# Patient Record
Sex: FEMALE | Race: BLACK OR AFRICAN AMERICAN | NOT HISPANIC OR LATINO | Employment: UNEMPLOYED | URBAN - METROPOLITAN AREA
[De-identification: names, ages, dates, MRNs, and addresses within clinical notes are randomized per-mention and may not be internally consistent; named-entity substitution may affect disease eponyms.]

---

## 2017-02-20 ENCOUNTER — ANESTHESIA (OUTPATIENT)
Dept: OBGYN | Facility: CLINIC | Age: 22
End: 2017-02-20
Payer: COMMERCIAL

## 2017-02-20 ENCOUNTER — ANESTHESIA EVENT (OUTPATIENT)
Dept: OBGYN | Facility: CLINIC | Age: 22
End: 2017-02-20
Payer: COMMERCIAL

## 2017-02-20 PROBLEM — O14.90 PREECLAMPSIA: Status: ACTIVE | Noted: 2017-02-20

## 2017-02-20 PROBLEM — Z36.89 ENCOUNTER FOR TRIAGE IN PREGNANT PATIENT: Status: ACTIVE | Noted: 2017-02-20

## 2017-02-20 PROCEDURE — 25000125 ZZHC RX 250: Performed by: ANESTHESIOLOGY

## 2017-02-20 RX ORDER — LIDOCAINE HYDROCHLORIDE AND EPINEPHRINE 5; 5 MG/ML; UG/ML
INJECTION, SOLUTION INFILTRATION; PERINEURAL PRN
Status: DISCONTINUED | OUTPATIENT
Start: 2017-02-20 | End: 2017-02-21 | Stop reason: HOSPADM

## 2017-02-20 RX ADMIN — Medication 5 ML: at 19:38

## 2017-02-20 RX ADMIN — LIDOCAINE HYDROCHLORIDE AND EPINEPHRINE 5 ML: 5; 5 INJECTION, SOLUTION INFILTRATION; PERINEURAL at 20:29

## 2017-02-20 RX ADMIN — Medication 5 ML: at 19:35

## 2017-02-21 NOTE — ANESTHESIA PROCEDURE NOTES
Epidural Procedure Note    Staff:     Anesthesiologist:  KAILEY RIVERA    Resident/CRNA:  MEGAN SZYMANSKI    Procedure performed by resident/CRNA in the presence of a teaching physician    Location: OB     Procedure start time:  2/20/2017 7:10 PM     Procedure end time:  2/20/2017 7:39 PM   Pre-procedure checklist:   patient identified, IV checked, risks and benefits discussed, informed consent, monitors and equipment checked, pre-op evaluation and at physician/surgeon's request      Correct Patient: Yes      Correct Position: Yes      Correct Site: Yes      Correct Procedure: Yes      Correct Laterality:  N/A  Procedure:     Procedure:  Epidural catheter    ASA:  2    Position:  Sitting    Sterile Prep: chloraprep      Insertion site:  L3-4    Local skin infiltration:  1% lidocaine    amount (mL):  2    Approach:  Midline    Needle gauge (G):  17    Needle Length (in):  3.5    Block Needle Type:  Touhy    Injection Technique:  LORT saline    TAMIKO at (cm):  7    Attempts:  1    Redirects:  0    Catheter gauge (G):  19    Catheter threaded easily: Yes      Threaded to cm at skin:  12    Threaded in epidural space (cm):  5    Paresthesias:  No    Aspiration negative for Heme or CSF: Yes       Local anesthetic:  Lidocaine 1.5% w/ 1:200,000 epinephrine    Test dose time:  19:30    Test dose negative for signs of intravascular, subdural or intrathecal injection: Yes

## 2017-02-21 NOTE — ANESTHESIA PREPROCEDURE EVALUATION
Anesthesia Evaluation       history and physical reviewed .      No history of anesthetic complications     ROS/MED HX    ENT/Pulmonary:  - neg pulmonary ROS     Neurologic:  - neg neurologic ROS     Cardiovascular:  - neg cardiovascular ROS       METS/Exercise Tolerance:     Hematologic:         Musculoskeletal:         GI/Hepatic:  - neg GI/hepatic ROS       Renal/Genitourinary:         Endo:         Psychiatric:         Infectious Disease:         Malignancy:         Other:               Physical Exam  Normal systems: cardiovascular, pulmonary and dental    Airway   Mallampati: III  TM distance: > 3 FB  Neck ROM: full    Dental     Cardiovascular       Pulmonary             (+) pre-eclampsia               Anesthesia Plan      History & Physical Review      ASA Status:  .  OB Epidural Asa: 2            Postoperative Care      Consents  Anesthetic plan, risks, benefits and alternatives discussed with:  Patient..

## 2018-01-08 ENCOUNTER — HOSPITAL ENCOUNTER (OUTPATIENT)
Dept: ULTRASOUND IMAGING | Facility: CLINIC | Age: 23
Discharge: HOME | End: 2018-01-08
Attending: MIDWIFE | Admitting: MIDWIFE
Payer: COMMERCIAL

## 2018-01-08 DIAGNOSIS — N91.2 AMENORRHEA: ICD-10-CM

## 2018-01-08 PROCEDURE — 76801 OB US < 14 WKS SINGLE FETUS: CPT

## 2018-03-14 DIAGNOSIS — Z36.89 ENCOUNTER FOR FETAL ANATOMIC SURVEY: Primary | ICD-10-CM

## 2018-03-16 ENCOUNTER — PRE VISIT (OUTPATIENT)
Dept: MATERNAL FETAL MEDICINE | Facility: CLINIC | Age: 23
End: 2018-03-16

## 2018-03-20 ENCOUNTER — OFFICE VISIT (OUTPATIENT)
Dept: MATERNAL FETAL MEDICINE | Facility: CLINIC | Age: 23
End: 2018-03-20
Attending: ADVANCED PRACTICE MIDWIFE
Payer: COMMERCIAL

## 2018-03-20 ENCOUNTER — HOSPITAL ENCOUNTER (OUTPATIENT)
Dept: ULTRASOUND IMAGING | Facility: CLINIC | Age: 23
Discharge: HOME | End: 2018-03-20
Attending: ADVANCED PRACTICE MIDWIFE | Admitting: OBSTETRICS & GYNECOLOGY
Payer: COMMERCIAL

## 2018-03-20 DIAGNOSIS — Z36.89 ENCOUNTER FOR FETAL ANATOMIC SURVEY: Primary | ICD-10-CM

## 2018-03-20 DIAGNOSIS — O26.90 PREGNANCY RELATED CONDITION, ANTEPARTUM: ICD-10-CM

## 2018-03-20 PROCEDURE — 76811 OB US DETAILED SNGL FETUS: CPT

## 2018-03-20 NOTE — PROGRESS NOTES
"Please see \"Imaging\" tab under \"Chart Review\" for details of today's US.      Gauri Del Rosario, DO  Maternal-Fetal Medicine        "

## 2018-03-20 NOTE — MR AVS SNAPSHOT
"              After Visit Summary   3/20/2018    Vandana Lagos    MRN: [id number]           Patient Information     Date Of Birth          1995        Visit Information        Provider Department      3/20/2018 12:15 PM Gauri Del Rosario, DO [facility] Maternal Fetal Medicine [facility]        Today's Diagnoses     Encounter for fetal anatomic survey    -  1       Follow-ups after your visit        Who to contact     If you have questions or need follow up information about today's clinic visit or your schedule please contact [facility] MATERNAL FETAL MEDICINE [facility] directly at [phone].  Normal or non-critical lab and imaging results will be communicated to you by [employer], letter or phone within 4 business days after the clinic has received the results. If you do not hear from us within 7 days, please contact the clinic through [employer] or phone. If you have a critical or abnormal lab result, we will notify you by phone as soon as possible.  Submit refill requests through [employer] or call your pharmacy and they will forward the refill request to us. Please allow 3 business days for your refill to be completed.          Additional Information About Your Visit        MyChart Information     [employer] lets you send messages to your doctor, view your test results, renew your prescriptions, schedule appointments and more. To sign up, go to [email or web address] . Click on \"Log in\" on the left side of the screen, which will take you to the Welcome page. Then click on \"Sign up Now\" on the right side of the page.     You will be asked to enter the access code listed below, as well as some personal information. Please follow the directions to create your username and password.     Your access code is: [email or web address]  Expires: 2018 12:53 PM     Your access code will  in 90 days. If you need help or a new code, please call your [place] clinic or [phone].        Care EveryWhere ID     This is your " Care EveryWhere ID. This could be used by other organizations to access your [place] medical records  [id number]        Your Vitals Were     Last Period                   09/10/2017            Blood Pressure from Last 3 Encounters:   02/22/17 129/79    Weight from Last 3 Encounters:   No data found for Wt              Today, you had the following     No orders found for display       Primary Care Provider Office Phone # Fax #    Clinic [facility] [phone]       [address] [facility] [address]        Equal Access to Services     MILANA OSULLIVAN : Hadii aad ku hadasho Soomaali, waaxda luqadaha, qaybta kaalmada adeegyada, waxay idiin hayaan adeeg kharaleeann lacorey . So [facility] [phone].    ATENCIÓN: Si habla español, tiene a waldron disposición servicios gratuitos de asistencia lingüística. Llame al [phone].    We comply with applicable federal civil rights laws and [place] laws. We do not discriminate on the basis of race, color, national origin, age, disability, sex, sexual orientation, or gender identity.            Thank you!     Thank you for choosing [facility]  for your care. Our goal is always to provide you with excellent care. Hearing back from our patients is one way we can continue to improve our services. Please take a few minutes to complete the written survey that you may receive in the mail after your visit with us. Thank you!             Your Updated Medication List - Protect others around you: Learn how to safely use, store and throw away your medicines at www.disposemymeds.org.          This list is accurate as of 3/20/18 12:53 PM.  Always use your most recent med list.                   Brand Name Dispense Instructions for use Diagnosis    acetaminophen 325 MG tablet    TYLENOL    100 tablet    Take 2 tablets (650 mg) by mouth every 6 hours as needed for mild pain or fever (greater than or equal to 38 C /100.4F (oral))    Vaginal delivery        flunisolide HFA 80 MCG/ACT Aers oral inhaler    AEROSPAN          Prenatal Vitamins 28-0.8 MG Tabs           PROAIR  (90 BASE) MCG/ACT Inhaler   Generic drug:  albuterol      Inhale 2 puffs into the lungs every 6 hours        VITAMIN D (CHOLECALCIFEROL) PO      Take 5,000 Units by mouth daily

## 2018-05-14 ENCOUNTER — HOSPITAL ENCOUNTER (OUTPATIENT)
Facility: CLINIC | Age: 23
End: 2018-05-14
Admitting: ADVANCED PRACTICE MIDWIFE
Payer: COMMERCIAL

## 2018-05-14 ENCOUNTER — HOSPITAL ENCOUNTER (OUTPATIENT)
Facility: CLINIC | Age: 23
Discharge: HOME | End: 2018-05-14
Attending: ADVANCED PRACTICE MIDWIFE | Admitting: ADVANCED PRACTICE MIDWIFE
Payer: COMMERCIAL

## 2018-05-14 VITALS — SYSTOLIC BLOOD PRESSURE: 116 MMHG | TEMPERATURE: 97.9 F | DIASTOLIC BLOOD PRESSURE: 73 MMHG

## 2018-05-14 LAB
ALBUMIN UR-MCNC: 10 MG/DL
APPEARANCE UR: CLEAR
BACTERIA #/AREA URNS HPF: ABNORMAL /HPF
BILIRUB UR QL STRIP: NEGATIVE
COLOR UR AUTO: YELLOW
GLUCOSE UR STRIP-MCNC: 300 MG/DL
HGB UR QL STRIP: ABNORMAL
KETONES UR STRIP-MCNC: NEGATIVE MG/DL
LEUKOCYTE ESTERASE UR QL STRIP: ABNORMAL
MUCOUS THREADS #/AREA URNS LPF: PRESENT /LPF
NITRATE UR QL: NEGATIVE
PH UR STRIP: 6.5 PH (ref 5–7)
RBC #/AREA URNS AUTO: 1 /HPF (ref 0–2)
SOURCE: ABNORMAL
SP GR UR STRIP: 1.02 (ref 1–1.03)
SPECIMEN SOURCE: NORMAL
SQUAMOUS #/AREA URNS AUTO: 3 /HPF (ref 0–1)
UROBILINOGEN UR STRIP-MCNC: NORMAL MG/DL (ref 0–2)
WBC #/AREA URNS AUTO: 6 /HPF (ref 0–5)
WET PREP SPEC: NORMAL

## 2018-05-14 PROCEDURE — 87210 SMEAR WET MOUNT SALINE/INK: CPT | Performed by: ADVANCED PRACTICE MIDWIFE

## 2018-05-14 PROCEDURE — G0463 HOSPITAL OUTPT CLINIC VISIT: HCPCS | Mod: 25

## 2018-05-14 PROCEDURE — 87491 CHLMYD TRACH DNA AMP PROBE: CPT | Performed by: ADVANCED PRACTICE MIDWIFE

## 2018-05-14 PROCEDURE — 81001 URINALYSIS AUTO W/SCOPE: CPT | Performed by: ADVANCED PRACTICE MIDWIFE

## 2018-05-14 PROCEDURE — 87591 N.GONORRHOEAE DNA AMP PROB: CPT | Performed by: ADVANCED PRACTICE MIDWIFE

## 2018-05-14 RX ORDER — ONDANSETRON 2 MG/ML
4 INJECTION INTRAMUSCULAR; INTRAVENOUS EVERY 6 HOURS PRN
Status: DISCONTINUED | OUTPATIENT
Start: 2018-05-14 | End: 2018-05-14 | Stop reason: HOSPADM

## 2018-05-14 NOTE — IP AVS SNAPSHOT
[facility]    [address] [place] [address]    Phone:  [phone]                                       After Visit Summary   5/14/2018    Vandana Lagos    MRN: [id number]           After Visit Summary Signature Page     I have received my discharge instructions, and my questions have been answered. I have discussed any challenges I see with this plan with the nurse or doctor.    ..........................................................................................................................................  Patient/Patient Representative Signature      ..........................................................................................................................................  Patient Representative Print Name and Relationship to Patient    ..................................................               ................................................  Date                                            Time    ..........................................................................................................................................  Reviewed by Signature/Title    ...................................................              ..............................................  Date                                                            Time

## 2018-05-14 NOTE — DISCHARGE INSTRUCTIONS
Discharge Instruction for Undelivered Patients      You were seen for: Bleeding Assessment  We Consulted: HUNTER Haynes CNM  You had (Test or Medicine): labs    Diet:   Drink 8 to 12 glasses of liquids (milk, juice, water) every day.     Activity:  Call your doctor or nurse midwife if your baby is moving less than usual.     Call your provider if you notice:  Swelling in your face or increased swelling in your hands or legs.  Headaches that are not relieved by Tylenol (acetaminophen).  Changes in your vision (blurring: seeing spots or stars.)  Nausea (sick to your stomach) and vomiting (throwing up).   Weight gain of 5 pounds or more per week.  Heartburn that doesn't go away.  Signs of bladder infection: pain when you urinate (use the toilet), need to go more often and more urgently.  The bag of julien (rupture of membranes) breaks, or you notice leaking in your underwear.  Bright red blood in your underwear.  Abdominal (lower belly) or stomach pain.  For first baby: Contractions (tightening) less than 5 minutes apart for one hour or more.  Second (plus) baby: Contractions (tightening) less than 10 minutes apart and getting stronger.  *If less than 34 weeks: Contractions (tightenings) more than 6 times in one hour.  Increase or change in vaginal discharge (note the color and amount)    Follow-up:  As scheduled in the clinic

## 2018-05-14 NOTE — PROGRESS NOTES
Data: [facility] at 1242.   Reason for maternal/fetal assessment per patient is pink discharge.  Patient is a . Prenatal record reviewed.      Obstetric History       T1      L1     SAB0   TAB0   Ectopic0   Multiple0   Live Births1       # Outcome Date GA Lbr Nikolai/2nd Weight Sex Delivery Anes PTL Lv   2 Current            1 Term 17 40w1d 04:35 / 00:33 3.6 kg (7 lb 15 oz) M Vag-Spont EPI  TENA      Name: ALDO CALDERONEVSLICK      Complications: GBS,Preeclampsia/Hypertension      Apgar1:  8                Apgar5: 9         Medical History:   Past Medical History:   Diagnosis Date     Preeclampsia 2017     Uncomplicated asthma    . Gestational Age 30w6d. VSS. Pt presents with c/o light pink.  Cervix: closed.  Fetal movement present. Patient denies cramping, backache, vaginal discharge, pelvic pressure, UTI symptoms, GI problems, edema, headache, visual disturbances, epigastric or URQ pain, abdominal pain, rupture of membranes. Support persons not present.  Action: Verbal consent for EFM. Triage assessment completed. EFM applied for fetal tracing. Uterine assessment toco. Fetal assessment: Presumed adequate fetal oxygenation documented (see flow record). Patient education pamphlets given on fetal movement counts and dc instructions. Patient instructed to report change in fetal movement, vaginal leaking of fluid or bleeding, abdominal pain, or any concerns related to the pregnancy to her nurse/physician.   Response: HUNTER Haynes CNM informed of pt arrival and assessed pt. Plan per provider is dc to home. Patient verbalized understanding of education and verbalized agreement with plan. Discharged ambulatory at 1500.

## 2018-05-14 NOTE — IP AVS SNAPSHOT
MRN:[id number]                      After Visit Summary   5/14/2018    Vandana Lagos    MRN: [id number]           Thank you!     Thank you for choosing [facility] for your care. Our goal is always to provide you with excellent care. Hearing back from our patients is one way we can continue to improve our services. Please take a few minutes to complete the written survey that you may receive in the mail after you visit with us. Thank you!        Patient Information     Date Of Birth          1995        About your hospital stay     You were admitted on:  May 14, 2018 You last received care in the:  [address]    You were discharged on:  May 14, 2018       Who to Call     For medical emergencies, please call 911.  For non-urgent questions about your medical care, please call your primary care provider or clinic, [phone]          Attending Provider     Provider Daniel Vaz APRN CNM [facility]       Primary Care Provider Office Phone # Fax #    Clinic [facility] [phone]      Further instructions from your care team       Discharge Instruction for Undelivered Patients      You were seen for: Bleeding Assessment  We Consulted: HUNTER Haynes CNM  You had (Test or Medicine): labs    Diet:   Drink 8 to 12 glasses of liquids (milk, juice, water) every day.     Activity:  Call your doctor or nurse midwife if your baby is moving less than usual.     Call your provider if you notice:  Swelling in your face or increased swelling in your hands or legs.  Headaches that are not relieved by Tylenol (acetaminophen).  Changes in your vision (blurring: seeing spots or stars.)  Nausea (sick to your stomach) and vomiting (throwing up).   Weight gain of 5 pounds or more per week.  Heartburn that doesn't go away.  Signs of bladder infection: pain when you urinate (use the toilet), need to go more often and more urgently.  The bag of julien (rupture of membranes) breaks, or you notice leaking  "in your underwear.  Bright red blood in your underwear.  Abdominal (lower belly) or stomach pain.  For first baby: Contractions (tightening) less than 5 minutes apart for one hour or more.  Second (plus) baby: Contractions (tightening) less than 10 minutes apart and getting stronger.  *If less than 34 weeks: Contractions (tightenings) more than 6 times in one hour.  Increase or change in vaginal discharge (note the color and amount)    Follow-up:  As scheduled in the clinic          Pending Results     Date and Time Order Name Status Description    2018 1334 Chlamydia trachomatis PCR In process     2018 1334 Neisseria gonorrhoea PCR In process             Statement of Approval     Ordered          18 1456  I have reviewed and agree with all the recommendations and orders detailed in this document.  EFFECTIVE NOW     Approved and electronically signed by:  Daneil Haynes APRN CNM             Admission Information     Date & Time Provider Department Dept. Phone    2018 Daniel Haynes APRN CNM [facility] [phone]      Your Vitals Were     Blood Pressure Temperature Last Period             116/73 97.9  F (36.6  C) (Oral) 09/10/2017         [email or web address] Information     [email or web address] lets you send messages to your doctor, view your test results, renew your prescriptions, schedule appointments and more. To sign up, go to [email or web address] . Click on \"Log in\" on the left side of the screen, which will take you to the Welcome page. Then click on \"Sign up Now\" on the right side of the page.     You will be asked to enter the access code listed below, as well as some personal information. Please follow the directions to create your username and password.     Your access code is: [email or web address]  Expires: 2018 12:53 PM     Your access code will  in 90 days. If you need help or a new code, please call your [place] clinic or [phone].        Care EveryWhere ID     This is your Care EveryWhere ID. " This could be used by other organizations to access your [place] medical records  [id number]        Equal Access to Services     MILANA OSULLIVAN : Hadii aad ku hadneerajrui [place], wadevinda graceryneha, qaybta karanjitda [place], waxkenia idiin haymarcellreid daughertygabriel annaliseblasleeann perera. So [facility] [phone].    ATENCIÓN: Si habla español, tiene a waldron disposición servicios gratuitos de asistencia lingüística. Llame al [phone].    We comply with applicable federal civil rights laws and [place] laws. We do not discriminate on the basis of race, color, national origin, age, disability, sex, sexual orientation, or gender identity.               Review of your medicines      UNREVIEWED medicines. Ask your doctor about these medicines        Dose / Directions    acetaminophen 325 MG tablet   Commonly known as:  TYLENOL   Used for:  Vaginal delivery        Dose:  650 mg   Take 2 tablets (650 mg) by mouth every 6 hours as needed for mild pain or fever (greater than or equal to 38 C /100.4F (oral))   Quantity:  100 tablet   Refills:  0       flunisolide HFA 80 MCG/ACT Aers oral inhaler   Commonly known as:  AEROSPAN        Refills:  0       Prenatal Vitamins 28-0.8 MG Tabs        Refills:  0       PROAIR  (90 Base) MCG/ACT Inhaler   Generic drug:  albuterol        Dose:  2 puff   Inhale 2 puffs into the lungs every 6 hours   Refills:  0       VITAMIN D (CHOLECALCIFEROL) PO        Dose:  5000 Units   Take 5,000 Units by mouth daily   Refills:  0                Protect others around you: Learn how to safely use, store and throw away your medicines at www.disposemymeds.org.             Medication List: This is a list of all your medications and when to take them. Check marks below indicate your daily home schedule. Keep this list as a reference.      Medications           Morning Afternoon Evening Bedtime As Needed    acetaminophen 325 MG tablet   Commonly known as:  TYLENOL   Take 2 tablets (650 mg) by mouth every 6 hours as needed for  mild pain or fever (greater than or equal to 38 C /100.4F (oral))                                flunisolide HFA 80 MCG/ACT Aers oral inhaler   Commonly known as:  AEROSPAN                                Prenatal Vitamins 28-0.8 MG Tabs                                PROAIR  (90 Base) MCG/ACT Inhaler   Inhale 2 puffs into the lungs every 6 hours   Generic drug:  albuterol                                VITAMIN D (CHOLECALCIFEROL) PO   Take 5,000 Units by mouth daily

## 2018-05-14 NOTE — DISCHARGE SUMMARY
HOSPITAL TRIAGE NOTE  ===================    CHIEF COMPLAINT  ========================  Vandana Lagos is a 22 year old patient presenting today at 30w6d for evaluation of vaginal bleeding.    Patient's last menstrual period was 09/10/2017.  Estimated Date of Delivery: 2018     HPI  ==================   Pt reports bright red bleeding noted after using restroom and wiping around 9a-10a.  Did not notice any blood in toilet.  Notes small cramping when at clinic this AM, but denies any cramping since. Reports + fetal movement. Denies leaking of fluid. Denies abnormal discharge, itching, irritation, or odor. Denies dysuria or increased urgency/frequency.  Reports intercourse around 0500 this AM.   Prenatal record and labs reviewed from [facility], through faxed records.    CONTRACTIONS: none  ABDOMINAL PAIN: none  FETAL MOVEMENT: active    VAGINAL BLEEDING: small, spotting and red  RUPTURE OF MEMBRANES: no  PELVIC PAIN: none    PREGNANCY COMPLICATIONS: hx of pre-eclampsia, sparse prenatal care  OTHER: none    # Pain Assessment:  Current Pain Score 2017   Patient currently in pain? denies   Pain location -   Pain descriptors -   Vandana benton pain level was assessed and she currently denies pain.        REVIEW OF SYSTEMS  =====================  C: NEGATIVE for fever, chills  I: NEGATIVE for worrisome rashes, moles or lesions  E: NEGATIVE for vision changes or irritation  R: NEGATIVE for significant cough or SOB  CV: NEGATIVE for chest pain, palpitations or varicosities  GI: NEGATIVE for nausea, abdominal pain, heartburn, or change in bowel habits  : NEGATIVE for frequency, dysuria, or hematuria  M: NEGATIVE for significant arthralgias or myalgia  N: NEGATIVE for headache, weakness, dizziness or paresthesias  P: NEGATIVE for changes in mood or affect    PROBLEM LIST  ===============  Patient Active Problem List    Diagnosis Date Noted     Vaginal delivery 2017     Priority: Medium     Encounter for  triage in pregnant patient 2017     Priority: Medium     Labor and delivery indication for care or intervention 2017     Priority: Medium     Preeclampsia 2017     Priority: Medium     HISTORIES  ==============  ALLERGIES:    No Known Allergies  PAST MEDICAL HISTORY  Past Medical History:   Diagnosis Date     Preeclampsia 2017     Uncomplicated asthma      SOCIAL HISTORY  Social History     Social History     Marital status: Single     Spouse name: N/A     Number of children: N/A     Years of education: N/A     Occupational History     Not on file.     Social History Main Topics     Smoking status: Not on file     Smokeless tobacco: Not on file     Alcohol use Not on file     Drug use: Not on file     Sexual activity: Not on file     Other Topics Concern     Not on file     Social History Narrative     FAMILY HISTORY  No family history on file.  OB HISTORY  Obstetric History       T1      L1     SAB0   TAB0   Ectopic0   Multiple0   Live Births1       # Outcome Date GA Lbr Nikolai/2nd Weight Sex Delivery Anes PTL Lv   2 Current            1 Term 17 40w1d 04:35 / 00:33 3.6 kg (7 lb 15 oz) M Vag-Spont EPI  TENA      Name: ALDO CALDERONEVA      Complications: GBS,Preeclampsia/Hypertension      Apgar1:  8                Apgar5: 9        Prenatal Labs:   Lab Results   Component Value Date    ABO O 2017    RH  Neg 2017    AS Neg 2016    HEPBANG Non-reactive 2016    TREPAB Negative 2017    RUQIGG 40 2016    HGB 10.6 (L) 2017     Rubella- immune    ULTRASOUND(s) reviewed: within normal limits    EXAM  ============  /73  LMP 09/10/2017  GENERAL APPEARANCE: healthy, alert and no distress  RESP: normal respiratory effort  CV: regular rates and rhythm  ABDOMEN:  soft, nontender, no epigastric pain  SKIN: no suspicious lesions or rashes  NEURO: Denies headache, blurred vision, other vision changes  PSYCH: mentation appears normal. and  affect normal/bright  MS/ LEGS: No clonus bilaterally and No edema    CONTRACTIONS: none   FETAL HEART TONES: continuous EFM- baseline 135 with moderate variability and positive accelerations. No decelerations.  NST: REACTIVE  EFW: 3 lbs    PELVIC EXAM: deferred; closed on SSE  HAMILTON SCORE: n/a  PRESENTATION: VERTEX  BLOOD: no  DISCHARGE: yellow, mucous-like discharge    ROM: no  ROMPLUS: not done    LABS: Wet prep and GC/ Chlamydia  Lab results reviewed- pending    DIAGNOSIS  ============  30w6d seen on the [facility] Triage for vaginal bleeding  NST: REACTIVE  Fetal Heart rate tracing: category one    PLAN  ============  Discharge to home with PTL instructions per discharge instruction form  Call or return to the [facility] with contractions, cramping, abdominal or pelvic pain, vaginal bleeding, leaking fluid or decreased fetal movement.  Follow up at your next clinic visit- encouraged to schedule within the next week    JAQUI Torres CNM     Fetal Non-Stress Test Results    NST Ordered By: JAQUI Torres CNM  NST Medical Indication: bleeding    NST Start & Stop Times  NST Start Time: 1345  NST Stop Time: 1415    NST Results  Fetus A   Baseline Rate: 135  Accelerations: Present  Decelerations: None  Interpretation: reactive

## 2018-05-15 LAB
C TRACH DNA SPEC QL NAA+PROBE: NEGATIVE
N GONORRHOEA DNA SPEC QL NAA+PROBE: NEGATIVE
SPECIMEN SOURCE: NORMAL
SPECIMEN SOURCE: NORMAL

## 2018-07-19 ENCOUNTER — DOCUMENTATION ONLY (OUTPATIENT)
Dept: OBGYN | Facility: CLINIC | Age: 23
End: 2018-07-19

## 2018-07-19 ENCOUNTER — HOSPITAL ENCOUNTER (INPATIENT)
Facility: CLINIC | Age: 23
LOS: 2 days | Discharge: HOME | End: 2018-07-21
Attending: ADVANCED PRACTICE MIDWIFE | Admitting: ADVANCED PRACTICE MIDWIFE
Payer: COMMERCIAL

## 2018-07-19 ENCOUNTER — TELEPHONE (OUTPATIENT)
Dept: OBGYN | Facility: CLINIC | Age: 23
End: 2018-07-19

## 2018-07-19 PROBLEM — Z36.89 ENCOUNTER FOR TRIAGE IN PREGNANT PATIENT: Status: RESOLVED | Noted: 2017-02-20 | Resolved: 2018-07-19

## 2018-07-19 PROBLEM — O14.90 PREECLAMPSIA: Status: RESOLVED | Noted: 2017-02-20 | Resolved: 2018-07-19

## 2018-07-19 LAB
ABO + RH BLD: NORMAL
ABO + RH BLD: NORMAL
ALT SERPL W P-5'-P-CCNC: 11 U/L (ref 0–50)
AMPHETAMINES UR QL SCN: NEGATIVE
AST SERPL W P-5'-P-CCNC: 8 U/L (ref 0–45)
BASOPHILS # BLD AUTO: 0 10E9/L (ref 0–0.2)
BASOPHILS NFR BLD AUTO: 0.3 %
CANNABINOIDS UR QL: ABNORMAL
COCAINE UR QL: NEGATIVE
CREAT SERPL-MCNC: 0.46 MG/DL (ref 0.52–1.04)
CREAT UR-MCNC: 135 MG/DL
DIFFERENTIAL METHOD BLD: ABNORMAL
EOSINOPHIL # BLD AUTO: 0 10E9/L (ref 0–0.7)
EOSINOPHIL NFR BLD AUTO: 0.4 %
ERYTHROCYTE [DISTWIDTH] IN BLOOD BY AUTOMATED COUNT: 13.5 % (ref 10–15)
GFR SERPL CREATININE-BSD FRML MDRD: >90 ML/MIN/1.7M2
HCT VFR BLD AUTO: 36.1 % (ref 35–47)
HGB BLD-MCNC: 11.5 G/DL (ref 11.7–15.7)
IMM GRANULOCYTES # BLD: 0 10E9/L (ref 0–0.4)
IMM GRANULOCYTES NFR BLD: 0.3 %
LYMPHOCYTES # BLD AUTO: 1.6 10E9/L (ref 0.8–5.3)
LYMPHOCYTES NFR BLD AUTO: 23.4 %
MCH RBC QN AUTO: 28.3 PG (ref 26.5–33)
MCHC RBC AUTO-ENTMCNC: 31.9 G/DL (ref 31.5–36.5)
MCV RBC AUTO: 89 FL (ref 78–100)
MONOCYTES # BLD AUTO: 0.4 10E9/L (ref 0–1.3)
MONOCYTES NFR BLD AUTO: 5.8 %
NEUTROPHILS # BLD AUTO: 4.7 10E9/L (ref 1.6–8.3)
NEUTROPHILS NFR BLD AUTO: 69.8 %
NRBC # BLD AUTO: 0 10*3/UL
NRBC BLD AUTO-RTO: 0 /100
OPIATES UR QL SCN: NEGATIVE
PCP UR QL SCN: NEGATIVE
PLATELET # BLD AUTO: 145 10E9/L (ref 150–450)
PROT UR-MCNC: 0.2 G/L
PROT/CREAT 24H UR: 0.15 G/G CR (ref 0–0.2)
RBC # BLD AUTO: 4.06 10E12/L (ref 3.8–5.2)
SPECIMEN EXP DATE BLD: NORMAL
URATE SERPL-MCNC: 2.9 MG/DL (ref 2.6–6)
WBC # BLD AUTO: 6.7 10E9/L (ref 4–11)

## 2018-07-19 PROCEDURE — 36415 COLL VENOUS BLD VENIPUNCTURE: CPT | Performed by: ADVANCED PRACTICE MIDWIFE

## 2018-07-19 PROCEDURE — 3E033VJ INTRODUCTION OF OTHER HORMONE INTO PERIPHERAL VEIN, PERCUTANEOUS APPROACH: ICD-10-PCS | Performed by: ADVANCED PRACTICE MIDWIFE

## 2018-07-19 PROCEDURE — 84450 TRANSFERASE (AST) (SGOT): CPT | Performed by: ADVANCED PRACTICE MIDWIFE

## 2018-07-19 PROCEDURE — 84156 ASSAY OF PROTEIN URINE: CPT | Performed by: ADVANCED PRACTICE MIDWIFE

## 2018-07-19 PROCEDURE — 80307 DRUG TEST PRSMV CHEM ANLYZR: CPT | Performed by: ADVANCED PRACTICE MIDWIFE

## 2018-07-19 PROCEDURE — 25000125 ZZHC RX 250: Performed by: ADVANCED PRACTICE MIDWIFE

## 2018-07-19 PROCEDURE — 84460 ALANINE AMINO (ALT) (SGPT): CPT | Performed by: ADVANCED PRACTICE MIDWIFE

## 2018-07-19 PROCEDURE — G0463 HOSPITAL OUTPT CLINIC VISIT: HCPCS

## 2018-07-19 PROCEDURE — 84550 ASSAY OF BLOOD/URIC ACID: CPT | Performed by: ADVANCED PRACTICE MIDWIFE

## 2018-07-19 PROCEDURE — 12000030 ZZH R&B OB INTERMEDIATE UMMC

## 2018-07-19 PROCEDURE — 86780 TREPONEMA PALLIDUM: CPT | Performed by: ADVANCED PRACTICE MIDWIFE

## 2018-07-19 PROCEDURE — 80349 CANNABINOIDS NATURAL: CPT | Performed by: ADVANCED PRACTICE MIDWIFE

## 2018-07-19 PROCEDURE — 10907ZC DRAINAGE OF AMNIOTIC FLUID, THERAPEUTIC FROM PRODUCTS OF CONCEPTION, VIA NATURAL OR ARTIFICIAL OPENING: ICD-10-PCS | Performed by: ADVANCED PRACTICE MIDWIFE

## 2018-07-19 PROCEDURE — 86901 BLOOD TYPING SEROLOGIC RH(D): CPT | Performed by: ADVANCED PRACTICE MIDWIFE

## 2018-07-19 PROCEDURE — 85025 COMPLETE CBC W/AUTO DIFF WBC: CPT | Performed by: ADVANCED PRACTICE MIDWIFE

## 2018-07-19 PROCEDURE — 82565 ASSAY OF CREATININE: CPT | Performed by: ADVANCED PRACTICE MIDWIFE

## 2018-07-19 PROCEDURE — 86900 BLOOD TYPING SEROLOGIC ABO: CPT | Performed by: ADVANCED PRACTICE MIDWIFE

## 2018-07-19 RX ORDER — IBUPROFEN 800 MG/1
800 TABLET, FILM COATED ORAL
Status: DISCONTINUED | OUTPATIENT
Start: 2018-07-19 | End: 2018-07-21 | Stop reason: HOSPADM

## 2018-07-19 RX ORDER — METHYLERGONOVINE MALEATE 0.2 MG/ML
200 INJECTION INTRAVENOUS
Status: DISCONTINUED | OUTPATIENT
Start: 2018-07-19 | End: 2018-07-21 | Stop reason: HOSPADM

## 2018-07-19 RX ORDER — OXYTOCIN/0.9 % SODIUM CHLORIDE 30/500 ML
100-340 PLASTIC BAG, INJECTION (ML) INTRAVENOUS CONTINUOUS PRN
Status: COMPLETED | OUTPATIENT
Start: 2018-07-19 | End: 2018-07-20

## 2018-07-19 RX ORDER — OXYTOCIN 10 [USP'U]/ML
10 INJECTION, SOLUTION INTRAMUSCULAR; INTRAVENOUS
Status: DISCONTINUED | OUTPATIENT
Start: 2018-07-19 | End: 2018-07-21 | Stop reason: HOSPADM

## 2018-07-19 RX ORDER — LIDOCAINE 40 MG/G
CREAM TOPICAL
Status: DISCONTINUED | OUTPATIENT
Start: 2018-07-19 | End: 2018-07-20 | Stop reason: CLARIF

## 2018-07-19 RX ORDER — ACETAMINOPHEN 325 MG/1
650 TABLET ORAL EVERY 4 HOURS PRN
Status: DISCONTINUED | OUTPATIENT
Start: 2018-07-19 | End: 2018-07-20 | Stop reason: CLARIF

## 2018-07-19 RX ORDER — ONDANSETRON 2 MG/ML
4 INJECTION INTRAMUSCULAR; INTRAVENOUS EVERY 6 HOURS PRN
Status: DISCONTINUED | OUTPATIENT
Start: 2018-07-19 | End: 2018-07-20 | Stop reason: CLARIF

## 2018-07-19 RX ORDER — OXYTOCIN/0.9 % SODIUM CHLORIDE 30/500 ML
1-24 PLASTIC BAG, INJECTION (ML) INTRAVENOUS CONTINUOUS
Status: DISCONTINUED | OUTPATIENT
Start: 2018-07-19 | End: 2018-07-20 | Stop reason: CLARIF

## 2018-07-19 RX ORDER — SODIUM CHLORIDE, SODIUM LACTATE, POTASSIUM CHLORIDE, CALCIUM CHLORIDE 600; 310; 30; 20 MG/100ML; MG/100ML; MG/100ML; MG/100ML
INJECTION, SOLUTION INTRAVENOUS CONTINUOUS
Status: DISCONTINUED | OUTPATIENT
Start: 2018-07-19 | End: 2018-07-20 | Stop reason: CLARIF

## 2018-07-19 RX ORDER — OXYCODONE AND ACETAMINOPHEN 5; 325 MG/1; MG/1
1 TABLET ORAL
Status: DISCONTINUED | OUTPATIENT
Start: 2018-07-19 | End: 2018-07-20 | Stop reason: CLARIF

## 2018-07-19 RX ORDER — CARBOPROST TROMETHAMINE 250 UG/ML
250 INJECTION, SOLUTION INTRAMUSCULAR
Status: DISCONTINUED | OUTPATIENT
Start: 2018-07-19 | End: 2018-07-21 | Stop reason: HOSPADM

## 2018-07-19 RX ORDER — NALOXONE HYDROCHLORIDE 0.4 MG/ML
.1-.4 INJECTION, SOLUTION INTRAMUSCULAR; INTRAVENOUS; SUBCUTANEOUS
Status: DISCONTINUED | OUTPATIENT
Start: 2018-07-19 | End: 2018-07-20

## 2018-07-19 RX ADMIN — OXYTOCIN-SODIUM CHLORIDE 0.9% IV SOLN 30 UNIT/500ML 2 MILLI-UNITS/MIN: 30-0.9/5 SOLUTION at 23:45

## 2018-07-19 NOTE — IP AVS SNAPSHOT
MRN:[id number]                      After Visit Summary   7/19/2018    Vandana Lagos    MRN: [id number]           Thank you!     Thank you for choosing [facility] for your care. Our goal is always to provide you with excellent care. Hearing back from our patients is one way we can continue to improve our services. Please take a few minutes to complete the written survey that you may receive in the mail after you visit with us. Thank you!        Patient Information     Date Of Birth          1995        Designated Caregiver       Most Recent Value    Caregiver    Will someone help with your care after discharge? no      About your hospital stay     You were admitted on:  July 19, 2018 You last received care in the:  [facility]    You were discharged on:  July 21, 2018        Reason for your hospital stay       Maternity care                  Who to Call     For medical emergencies, please call 911.  For non-urgent questions about your medical care, please call your primary care provider or clinic, [phone]          Attending Provider     Provider Specialty    Daniel Haynes APRN [facility]    DamianOlga APRN [facility]       Primary Care Provider Office Phone # Fax #    [facility] [phone]      After Care Instructions     Activity       Review discharge instructions            Diet       Resume previous diet            Discharge Instructions - Postpartum visit       Follow-up on Monday (7/23) in clinic for blood pressure check. Schedule postpartum visit with your provider and return to clinic in 6 weeks.                  Further instructions from your care team       Postpartum Vaginal Delivery Instructions    Activity       Ask family and friends for help when you need it.    Do not place anything in your vagina for 6 weeks.    You are not restricted on other activities, but take it easy for a few weeks to allow your body to recover from delivery.  You are  able to do any activities you feel up to that point.    No driving until you have stopped taking your pain medications (usually two weeks after delivery).     Call your health care provider if you have any of these symptoms:       Increased pain, swelling, redness, or fluid around your stiches from an episiotomy or perineal tear.    A fever above 100.4 F (38 C) with or without chills when placing a thermometer under your tongue.    You soak a sanitary pad with blood within 1 hour, or you see blood clots larger than a golf ball.    Bleeding that lasts more than 6 weeks.    Vaginal discharge that smells bad.    Severe pain, cramping or tenderness in your lower belly area.    A need to urinate more frequently (use the toilet more often), more urgently (use the toilet very quickly), or it burns when you urinate.    Nausea and vomiting.    Redness, swelling or pain around a vein in your leg.    Problems breastfeeding or a red or painful area on your breast.    Chest pain and cough or are gasping for air.    Problems coping with sadness, anxiety, or depression.  If you have any concerns about hurting yourself or the baby, call your provider immediately.     You have questions or concerns after you return home.     Keep your hands clean:  Always wash your hands before touching your perineal area and stitches.  This helps reduce your risk of infection.  If your hands aren't dirty, you may use an alcohol hand-rub to clean your hands. Keep your nails clean and short.        Pending Results     Date and Time Order Name Status Description    7/21/2018 0600 Rh Immune Globulin Study In process     7/19/2018 2210 Cannabinoids qualitative confirm urine In process             Statement of Approval     Ordered          07/21/18 1200  I have reviewed and agree with all the recommendations and orders detailed in this document.  EFFECTIVE NOW     Approved and electronically signed by:  Daniel Haynes APRN CN             Admission  "Information     Date & Time Provider Department Dept. Phone    2018 Olga Damian, JAQUI CNM [facility] [phone]      Your Vitals Were     Blood Pressure Pulse Temperature Respirations Height Weight    141/93 71 98.2  F (36.8  C) (Oral) 17 1.753 m (5' 9\") 101.2 kg (223 lb)    Last Period Pulse Oximetry BMI (Body Mass Index)             09/10/2017 99% 32.93 kg/m2         [employer] Information     [employer] lets you send messages to your doctor, view your test results, renew your prescriptions, schedule appointments and more. To sign up, go to [email or web address] . Click on \"Log in\" on the left side of the screen, which will take you to the Welcome page. Then click on \"Sign up Now\" on the right side of the page.     You will be asked to enter the access code listed below, as well as some personal information. Please follow the directions to create your username and password.     Your access code is: [email or web address]  Expires: 10/19/2018 12:00 PM     Your access code will  in 90 days. If you need help or a new code, please call your [facility] clinic or [phone].        Care EveryWhere ID     This is your Care EveryWhere ID. This could be used by other organizations to access your [facility] medical records  [id number]        Equal Access to Services     [facility]: Hadii kira duqueo Sochris, waaxda luqadaha, qaybta kaalmada adeegyada, shelia lewis . So [facility] [phone].    ATENCIÓN: Si habla español, tiene a waldron disposición servicios gratuitos de asistencia lingüística. Llame al [phone].    We comply with applicable federal civil rights laws and [place] laws. We do not discriminate on the basis of race, color, national origin, age, disability, sex, sexual orientation, or gender identity.               Review of your medicines      START taking        Dose / Directions    ibuprofen 800 MG tablet   Commonly known as:  ADVIL/MOTRIN   Used for:   (normal " spontaneous vaginal delivery)        Dose:  800 mg   Take 1 tablet (800 mg) by mouth once as needed for moderate pain (mild-moderate pain)   Quantity:  90 tablet   Refills:  0       senna-docusate 8.6-50 MG per tablet   Commonly known as:  SENOKOT-S;PERICOLACE   Used for:   (normal spontaneous vaginal delivery)        Dose:  1 tablet   Take 1 tablet by mouth 2 times daily   Quantity:  90 tablet   Refills:  0         CONTINUE these medicines which have NOT CHANGED        Dose / Directions    acetaminophen 325 MG tablet   Commonly known as:  TYLENOL   Used for:  Vaginal delivery        Dose:  650 mg   Take 2 tablets (650 mg) by mouth every 6 hours as needed for mild pain or fever (greater than or equal to 38 C /100.4F (oral))   Quantity:  100 tablet   Refills:  0       flunisolide HFA 80 MCG/ACT Aers oral inhaler   Commonly known as:  AEROSPAN        Refills:  0       Prenatal Vitamins 28-0.8 MG Tabs        Refills:  0       PROAIR  (90 Base) MCG/ACT Inhaler   Generic drug:  albuterol        Dose:  2 puff   Inhale 2 puffs into the lungs every 6 hours   Refills:  0       VITAMIN D (CHOLECALCIFEROL) PO        Dose:  5000 Units   Take 5,000 Units by mouth daily   Refills:  0            Where to get your medicines      These medications were sent to [facility] [address]  [address] [address]     Phone:  [phone]     ibuprofen 800 MG tablet    senna-docusate 8.6-50 MG per tablet                Protect others around you: Learn how to safely use, store and throw away your medicines at www.disposemymeds.org.             Medication List: This is a list of all your medications and when to take them. Check marks below indicate your daily home schedule. Keep this list as a reference.      Medications           Morning Afternoon Evening Bedtime As Needed    acetaminophen 325 MG tablet   Commonly known as:  TYLENOL   Take 2 tablets (650 mg) by mouth  every 6 hours as needed for mild pain or fever (greater than or equal to 38 C /100.4F (oral))   Last time this was given:  650 mg on 7/21/2018  7:35 AM                                flunisolide HFA 80 MCG/ACT Aers oral inhaler   Commonly known as:  AEROSPAN                                ibuprofen 800 MG tablet   Commonly known as:  ADVIL/MOTRIN   Take 1 tablet (800 mg) by mouth once as needed for moderate pain (mild-moderate pain)   Last time this was given:  800 mg on 7/21/2018  7:35 AM                                Prenatal Vitamins 28-0.8 MG Tabs                                PROAIR  (90 Base) MCG/ACT Inhaler   Inhale 2 puffs into the lungs every 6 hours   Generic drug:  albuterol                                senna-docusate 8.6-50 MG per tablet   Commonly known as:  SENOKOT-S;PERICOLACE   Take 1 tablet by mouth 2 times daily   Last time this was given:  1 tablet on 7/21/2018  7:35 AM                                VITAMIN D (CHOLECALCIFEROL) PO   Take 5,000 Units by mouth daily

## 2018-07-19 NOTE — IP AVS SNAPSHOT
[facility]    [address] [address]    Phone:  [phone]                                       After Visit Summary   7/19/2018    Vandana Lagos    MRN: [id number]           After Visit Summary Signature Page     I have received my discharge instructions, and my questions have been answered. I have discussed any challenges I see with this plan with the nurse or doctor.    ..........................................................................................................................................  Patient/Patient Representative Signature      ..........................................................................................................................................  Patient Representative Print Name and Relationship to Patient    ..................................................               ................................................  Date                                            Time    ..........................................................................................................................................  Reviewed by Signature/Title    ...................................................              ..............................................  Date                                                            Time

## 2018-07-20 ENCOUNTER — ANESTHESIA (OUTPATIENT)
Dept: OBGYN | Facility: CLINIC | Age: 23
End: 2018-07-20
Payer: COMMERCIAL

## 2018-07-20 ENCOUNTER — ANESTHESIA EVENT (OUTPATIENT)
Dept: OBGYN | Facility: CLINIC | Age: 23
End: 2018-07-20
Payer: COMMERCIAL

## 2018-07-20 LAB
BLOOD BANK CMNT PATIENT-IMP: NORMAL
T PALLIDUM AB SER QL: NONREACTIVE

## 2018-07-20 PROCEDURE — 25000132 ZZH RX MED GY IP 250 OP 250 PS 637: Performed by: ADVANCED PRACTICE MIDWIFE

## 2018-07-20 PROCEDURE — 12000030 ZZH R&B OB INTERMEDIATE UMMC

## 2018-07-20 PROCEDURE — 72200001 ZZH LABOR CARE VAGINAL DELIVERY SINGLE

## 2018-07-20 PROCEDURE — 25000128 H RX IP 250 OP 636: Performed by: STUDENT IN AN ORGANIZED HEALTH CARE EDUCATION/TRAINING PROGRAM

## 2018-07-20 PROCEDURE — 25000125 ZZHC RX 250: Performed by: ADVANCED PRACTICE MIDWIFE

## 2018-07-20 PROCEDURE — 25000128 H RX IP 250 OP 636: Performed by: ADVANCED PRACTICE MIDWIFE

## 2018-07-20 PROCEDURE — 25000125 ZZHC RX 250: Performed by: STUDENT IN AN ORGANIZED HEALTH CARE EDUCATION/TRAINING PROGRAM

## 2018-07-20 RX ORDER — OXYTOCIN 10 [USP'U]/ML
10 INJECTION, SOLUTION INTRAMUSCULAR; INTRAVENOUS
Status: DISCONTINUED | OUTPATIENT
Start: 2018-07-20 | End: 2018-07-21 | Stop reason: HOSPADM

## 2018-07-20 RX ORDER — IBUPROFEN 800 MG/1
800 TABLET, FILM COATED ORAL EVERY 6 HOURS PRN
Status: DISCONTINUED | OUTPATIENT
Start: 2018-07-20 | End: 2018-07-21 | Stop reason: HOSPADM

## 2018-07-20 RX ORDER — OXYTOCIN/0.9 % SODIUM CHLORIDE 30/500 ML
100 PLASTIC BAG, INJECTION (ML) INTRAVENOUS CONTINUOUS
Status: DISCONTINUED | OUTPATIENT
Start: 2018-07-20 | End: 2018-07-21 | Stop reason: HOSPADM

## 2018-07-20 RX ORDER — AMOXICILLIN 250 MG
2 CAPSULE ORAL 2 TIMES DAILY
Status: DISCONTINUED | OUTPATIENT
Start: 2018-07-20 | End: 2018-07-21 | Stop reason: HOSPADM

## 2018-07-20 RX ORDER — LIDOCAINE HYDROCHLORIDE 10 MG/ML
INJECTION, SOLUTION INFILTRATION; PERINEURAL
Status: DISCONTINUED
Start: 2018-07-20 | End: 2018-07-20 | Stop reason: WASHOUT

## 2018-07-20 RX ORDER — MISOPROSTOL 200 UG/1
TABLET ORAL
Status: DISCONTINUED
Start: 2018-07-20 | End: 2018-07-20 | Stop reason: WASHOUT

## 2018-07-20 RX ORDER — MISOPROSTOL 200 UG/1
400 TABLET ORAL
Status: DISCONTINUED | OUTPATIENT
Start: 2018-07-20 | End: 2018-07-21 | Stop reason: HOSPADM

## 2018-07-20 RX ORDER — AMOXICILLIN 250 MG
1 CAPSULE ORAL 2 TIMES DAILY
Status: DISCONTINUED | OUTPATIENT
Start: 2018-07-20 | End: 2018-07-21 | Stop reason: HOSPADM

## 2018-07-20 RX ORDER — ACETAMINOPHEN 325 MG/1
650 TABLET ORAL EVERY 4 HOURS PRN
Status: DISCONTINUED | OUTPATIENT
Start: 2018-07-20 | End: 2018-07-21 | Stop reason: HOSPADM

## 2018-07-20 RX ORDER — NALBUPHINE HYDROCHLORIDE 10 MG/ML
2.5-5 INJECTION, SOLUTION INTRAMUSCULAR; INTRAVENOUS; SUBCUTANEOUS EVERY 6 HOURS PRN
Status: DISCONTINUED | OUTPATIENT
Start: 2018-07-20 | End: 2018-07-20 | Stop reason: CLARIF

## 2018-07-20 RX ORDER — LIDOCAINE HYDROCHLORIDE AND EPINEPHRINE 15; 5 MG/ML; UG/ML
INJECTION, SOLUTION EPIDURAL PRN
Status: DISCONTINUED | OUTPATIENT
Start: 2018-07-20 | End: 2018-07-21 | Stop reason: HOSPADM

## 2018-07-20 RX ORDER — BISACODYL 10 MG
10 SUPPOSITORY, RECTAL RECTAL DAILY PRN
Status: DISCONTINUED | OUTPATIENT
Start: 2018-07-22 | End: 2018-07-21 | Stop reason: HOSPADM

## 2018-07-20 RX ORDER — BUPIVACAINE HYDROCHLORIDE 7.5 MG/ML
INJECTION, SOLUTION INTRASPINAL
Status: DISPENSED
Start: 2018-07-20 | End: 2018-07-21

## 2018-07-20 RX ORDER — FENTANYL CITRATE 50 UG/ML
50-100 INJECTION, SOLUTION INTRAMUSCULAR; INTRAVENOUS
Status: DISCONTINUED | OUTPATIENT
Start: 2018-07-20 | End: 2018-07-20 | Stop reason: CLARIF

## 2018-07-20 RX ORDER — OXYTOCIN/0.9 % SODIUM CHLORIDE 30/500 ML
340 PLASTIC BAG, INJECTION (ML) INTRAVENOUS CONTINUOUS PRN
Status: DISCONTINUED | OUTPATIENT
Start: 2018-07-20 | End: 2018-07-21 | Stop reason: HOSPADM

## 2018-07-20 RX ORDER — HYDROXYZINE HYDROCHLORIDE 50 MG/1
100 TABLET, FILM COATED ORAL EVERY 6 HOURS PRN
Status: DISCONTINUED | OUTPATIENT
Start: 2018-07-20 | End: 2018-07-20 | Stop reason: CLARIF

## 2018-07-20 RX ORDER — OXYTOCIN 10 [USP'U]/ML
INJECTION, SOLUTION INTRAMUSCULAR; INTRAVENOUS
Status: DISCONTINUED
Start: 2018-07-20 | End: 2018-07-20 | Stop reason: WASHOUT

## 2018-07-20 RX ORDER — HYDROCORTISONE 2.5 %
CREAM (GRAM) TOPICAL 3 TIMES DAILY PRN
Status: DISCONTINUED | OUTPATIENT
Start: 2018-07-20 | End: 2018-07-21 | Stop reason: HOSPADM

## 2018-07-20 RX ORDER — OXYTOCIN/0.9 % SODIUM CHLORIDE 30/500 ML
PLASTIC BAG, INJECTION (ML) INTRAVENOUS
Status: DISCONTINUED
Start: 2018-07-20 | End: 2018-07-20 | Stop reason: WASHOUT

## 2018-07-20 RX ORDER — NALOXONE HYDROCHLORIDE 0.4 MG/ML
.1-.4 INJECTION, SOLUTION INTRAMUSCULAR; INTRAVENOUS; SUBCUTANEOUS
Status: DISCONTINUED | OUTPATIENT
Start: 2018-07-20 | End: 2018-07-21 | Stop reason: HOSPADM

## 2018-07-20 RX ORDER — EPHEDRINE SULFATE 50 MG/ML
5 INJECTION, SOLUTION INTRAMUSCULAR; INTRAVENOUS; SUBCUTANEOUS
Status: DISCONTINUED | OUTPATIENT
Start: 2018-07-20 | End: 2018-07-20 | Stop reason: CLARIF

## 2018-07-20 RX ORDER — LANOLIN 100 %
OINTMENT (GRAM) TOPICAL
Status: DISCONTINUED | OUTPATIENT
Start: 2018-07-20 | End: 2018-07-21 | Stop reason: HOSPADM

## 2018-07-20 RX ADMIN — SENNOSIDES AND DOCUSATE SODIUM 2 TABLET: 8.6; 5 TABLET ORAL at 19:50

## 2018-07-20 RX ADMIN — Medication 10 ML/HR: at 10:03

## 2018-07-20 RX ADMIN — FENTANYL CITRATE 100 MCG: 50 INJECTION INTRAMUSCULAR; INTRAVENOUS at 04:39

## 2018-07-20 RX ADMIN — LIDOCAINE HYDROCHLORIDE,EPINEPHRINE BITARTRATE 3 ML: 15; .005 INJECTION, SOLUTION EPIDURAL; INFILTRATION; INTRACAUDAL; PERINEURAL at 09:54

## 2018-07-20 RX ADMIN — FENTANYL CITRATE 100 MCG: 50 INJECTION INTRAMUSCULAR; INTRAVENOUS at 08:32

## 2018-07-20 RX ADMIN — OXYTOCIN-SODIUM CHLORIDE 0.9% IV SOLN 30 UNIT/500ML 340 ML/HR: 30-0.9/5 SOLUTION at 11:01

## 2018-07-20 RX ADMIN — ACETAMINOPHEN 650 MG: 325 TABLET, FILM COATED ORAL at 02:07

## 2018-07-20 RX ADMIN — SODIUM CHLORIDE, POTASSIUM CHLORIDE, SODIUM LACTATE AND CALCIUM CHLORIDE 500 ML: 600; 310; 30; 20 INJECTION, SOLUTION INTRAVENOUS at 08:35

## 2018-07-20 RX ADMIN — IBUPROFEN 800 MG: 800 TABLET, FILM COATED ORAL at 19:50

## 2018-07-20 RX ADMIN — HYDROXYZINE HYDROCHLORIDE 100 MG: 50 TABLET, FILM COATED ORAL at 02:32

## 2018-07-20 RX ADMIN — ACETAMINOPHEN 650 MG: 325 TABLET, FILM COATED ORAL at 19:50

## 2018-07-20 RX ADMIN — SODIUM CHLORIDE, POTASSIUM CHLORIDE, SODIUM LACTATE AND CALCIUM CHLORIDE: 600; 310; 30; 20 INJECTION, SOLUTION INTRAVENOUS at 00:19

## 2018-07-20 RX ADMIN — Medication: at 10:05

## 2018-07-20 RX ADMIN — ACETAMINOPHEN 650 MG: 325 TABLET, FILM COATED ORAL at 23:54

## 2018-07-20 ASSESSMENT — ACTIVITIES OF DAILY LIVING (ADL)
FALL_HISTORY_WITHIN_LAST_SIX_MONTHS: NO
RETIRED_COMMUNICATION: 0-->UNDERSTANDS/COMMUNICATES WITHOUT DIFFICULTY
BATHING: 0-->INDEPENDENT
COGNITION: 0 - NO COGNITION ISSUES REPORTED
TOILETING: 0-->INDEPENDENT
RETIRED_EATING: 0-->INDEPENDENT
AMBULATION: 0-->INDEPENDENT
TRANSFERRING: 0-->INDEPENDENT
DRESS: 0-->INDEPENDENT
SWALLOWING: 0-->SWALLOWS FOODS/LIQUIDS WITHOUT DIFFICULTY

## 2018-07-20 ASSESSMENT — LIFESTYLE VARIABLES: TOBACCO_USE: 1

## 2018-07-20 NOTE — H&P
ADMIT NOTE  =================  40w2d    Vandana Lagos is a 22 year old female with an Patient's last menstrual period was 09/10/2017. and Estimated Date of Delivery: 2018 [facility] at 10:56 [facility] with for induction of labor.  Indication: gestational hypertension.     HPI  ================  Pt was seen at [facility] clinic today for routine prenatal visit and noted to have elevated BP.  Pt also reports intense headache that spontaneously resolved and RUQ pain. Has history of pre-eclampsia with first baby.  [facility]     Upon presentation to [facility], pt reports + fetal movement. Notes some mild contractions. Denies leaking of fluid or vaginal bleeding. Initial blood pressure reading 141/85.    Contractions- mild and irregular  Fetal movement- active  ROM- no   Vaginal bleeding- none  GBS- pending  FOB- is involved, bedside.  Other labor support- none    Weight gain- 224 - 180 lbs, Total weight gain- 44 lbs  Height- 70 in  BMI- 24  First prenatal visit at 12 weeks, Total visits- 5    PROBLEM LIST  =================  Patient Active Problem List    Diagnosis Date Noted     Labor and delivery, indication for care 2018     Priority: Medium     Labor and delivery indication for care or intervention 2017     Priority: Medium     HISTORIES  ============  No Known Allergies  Past Medical History:   Diagnosis Date     Preeclampsia 2017     Uncomplicated asthma      History reviewed. No pertinent surgical history..  No family history on file.  Social History   Substance Use Topics     Smoking status: Former Smoker     Quit date: 2017     Smokeless tobacco: Never Used     Alcohol use No     Obstetric History       T1      L1     SAB0   TAB0   Ectopic0   Multiple0   Live Births1       # Outcome Date GA Lbr Nikolai/2nd Weight Sex Delivery Anes PTL Lv   2 Current            1 Term 17 40w1d 04:35 / 00:33  "3.6 kg (7 lb 15 oz) M Vag-Spont EPI  TENA      Name: ALDO CALDERON      Complications: GBS,Preeclampsia/Hypertension      Apgar1:  8                Apgar5: 9         LABS:   ===========  Prenatal Labs:  Rhogam indicated and given on 4/11/18   Lab Results   Component Value Date    ABO PENDING 07/19/2018    RH  Neg 02/21/2017    AS Neg 07/12/2016    HEPBANG Non-reactive 07/12/2016    TREPAB Negative 02/20/2017    HGB 11.5 (L) 07/19/2018     Rubella immune  Lab Results   Component Value Date    GBS Positive 02/06/2017     Other labs:  Results for orders placed or performed during the hospital encounter of 07/19/18 (from the past 24 hour(s))   ABO and Rh   Result Value Ref Range    ABO PENDING     Specimen Expires 07/22/2018    CBC with platelets differential   Result Value Ref Range    WBC 6.7 4.0 - 11.0 10e9/L    RBC Count 4.06 3.8 - 5.2 10e12/L    Hemoglobin 11.5 (L) 11.7 - 15.7 g/dL    Hematocrit 36.1 35.0 - 47.0 %    MCV 89 78 - 100 fl    MCH 28.3 26.5 - 33.0 pg    MCHC 31.9 31.5 - 36.5 g/dL    RDW 13.5 10.0 - 15.0 %    Platelet Count 145 (L) 150 - 450 10e9/L    Diff Method Automated Method     % Neutrophils 69.8 %    % Lymphocytes 23.4 %    % Monocytes 5.8 %    % Eosinophils 0.4 %    % Basophils 0.3 %    % Immature Granulocytes 0.3 %    Nucleated RBCs 0 0 /100    Absolute Neutrophil 4.7 1.6 - 8.3 10e9/L    Absolute Lymphocytes 1.6 0.8 - 5.3 10e9/L    Absolute Monocytes 0.4 0.0 - 1.3 10e9/L    Absolute Eosinophils 0.0 0.0 - 0.7 10e9/L    Absolute Basophils 0.0 0.0 - 0.2 10e9/L    Abs Immature Granulocytes 0.0 0 - 0.4 10e9/L    Absolute Nucleated RBC 0.0      ROS  =========  Pt denies significant respiratory, cardiovacular, GI, or muscular/skeletalcomplaints.    See RN data base ROS.     PHYSICAL EXAM:  ===============  /82  Pulse 108  Temp 99.1  F (37.3  C) (Oral)  Resp 16  Ht 1.753 m (5' 9\")  Wt 101.2 kg (223 lb)  LMP 09/10/2017  BMI 32.93 kg/m2  General appearance: comfortable  GENERAL " APPEARANCE: healthy, alert and no distress  RESP: normal respiratory effort  CV: regular rates and rhythm, normal S1 S2, no S3 or S4 and no murmur,and no varicosities  ABDOMEN:  soft, nontender, no epigastric pain  SKIN: no suspicious lesions or rashes  NEURO: Denies headache currently, blurred vision, other vision changes  PSYCH: mentation appears normal. and affect normal/bright  Legs: Reflexes normal bilaterally, trace edema      Abdomen: gravid, vertex fetus per Leopold's, non-tender between contractions.   Cephalic presentation confirmed by BSUS  EFW-  8.5 lbs.   CONTRACTIONS: irreg, mild, q 3-10  FETAL HEART TONES: continuous EFM- baseline 155 with moderate variability and no accelerations. No decelerations.  PELVIC EXAM: 2.5/ 60%/ Anterior/ soft/ -3   HAMILTON SCORE: 8  BLOODY SHOW: no   ROM:no  FLUID: none  AMNISURE: not done    ASSESSMENT:  ==============  IUP @ 40w2d admitted for induction of labor.  Indication: gestational hypertension   NST NON-REACTIVE  Fetal Heart Rate - category one  GBS- pending    Patient Active Problem List   Diagnosis     Labor and delivery indication for care or intervention     Labor and delivery, indication for care     PLAN:  ===========  Admit - see IP orders  Ambulation, hydration, position changes, birthing ball and tub options to facilitate labor reviewed with pt .  Discussed Utox d/t previous +THC in prenatal care and limited prenatal care. Pt consents.  Pain medication reviewed. Desire epidural at some point in labor.   Labor induction with Pitocin reviewed with pt. Agreeable to plan.  GBS unknown. Collected today in clinic, results pending.   MD consultant on call Dr. Multani/ JAQUI Velazquez CNM

## 2018-07-20 NOTE — PROGRESS NOTES
"Blood pressure 134/82, pulse 108, temperature 98.4  F (36.9  C), temperature source Oral, resp. rate 16, height 1.753 m (5' 9\"), weight 101.2 kg (223 lb), last menstrual period 09/10/2017, unknown if currently breastfeeding.    General appearance: pt sleeping  CONTRACTIONS: mild and every 2-6 minutes  Pitocin- 6 mu/min.,  Antibiotics- none  FETAL HEART TONES: continuous EFM- baseline 125 with moderate variability and positive accelerations. No decelerations.  ROM: not ruptured  PELVIC EXAM:deferred    # Pain Assessment:  Current Pain Score 7/20/2018   Patient currently in pain? yes   Pain location Uterine   Pain descriptors Cramagdalena benton pain level was assessed and she currently denies pain.      ASSESSMENT:  ==============  IUP @ 40w3d for induction of labor.  Indication: gestational hypertension   Fetal Heart Rate Tracing category one  GBS- pending    PLAN:  ===========  Ambulation, hydration, position changes, birthing ball/sling and tub options to facilitate labor.  Reevaluate in 2-4 hours prn  Continue labor induction with Pitocin  MD consultant on call Dr. Multani/ available prn     JAQUI Torres CNM    "

## 2018-07-20 NOTE — PROVIDER NOTIFICATION
07/20/18 0420   Provider Notification   Provider Name/Title HUNTER Haynes   Method of Notification In Department   Request Evaluate - Remote   Notification Reason Pain   Pt feeling increased pain with contractions. Would like to try IV Fentanyl prior to epidural placement.
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
## 2018-07-20 NOTE — PROVIDER NOTIFICATION
07/20/18 1332   Provider Notification   Provider Name/Title Olga Damian CNM   Method of Notification Electronic Page   Request Evaluate - Remote   Notification Reason Maternal Vital Sign Change  (FYI page send about mildly elevated BPs, no s/s of pre-e)

## 2018-07-20 NOTE — L&D DELIVERY NOTE
DELIVERY NOTE:  Brief Labor Course: Vandana presented to [facility] on 18 for IOL for gestational hypertension. She was seen at [facility] clinic earlier that day for routine prenatal visit and was noted to have elevated BP. She also reported a headache that spontaneously resolved and RUQ pain. Has history of preeclampsia with first baby. Pre-eclampsia labs were WNL upon admit. Cervix was 2.5/60/-3, and Pitocin was started. Vandana became uncomfortable around 0430 and received one dose of IV Fentanyl. Requested epidural around 0830 when she was 4/90/-2. Received one more dose of IV Fentanyl prior to her epidural. Felt rectal pressure after her epidural; bladder drained with straight cath and cervix was found to be 8/90/0. Offered AROM and patient consented after risks/benefits discussion. AROM for moderate amount of meconium stained fluid. Continued to feel urge to push, and was found to be 10/100/+2.     Delivery Note:   Vandana began pushing in semi-ramirez's position. Made good progress with guided pushing. NICU present for birth. Slow  of fetal head noted, staff prepared for possible shoulder dystocia. Head delivered in the OA position at 1056. No nuchal cord identified. Shoulder dystocia was identified at that time and made known to staff in room. Called for McRobert's position, suprapubic pressure, and OB to room. McRobert's and suprapubic pressure were performed at 1057, and anterior shoulder was delivered after. The rest of body delivered with ease, and viable baby girl was born at 1058. Baby was non-vigorous; cord was immediately clamped and cut and baby was transferred to warm where NICU assumed cares. Cord segment cut for cord gases. Patient consented to active management of third stage with IV Pitocin, which was started after delivery of baby. Placenta delivered spontaneously via [place] mechanism; intact with 3 vessel cord. Vandana desires to take placenta home. Fundus firm and centered,  "bleeding minimal. Perineum inspected and found to be intact. Shallow bilateral periurethral lacerations identified; they were hemostatic and did not require sutures. Baby stable and placed on maternal chest.     IUP at 40 weeks 3 days gestation delivered on 2018.     delivery of a viable Female infant.  Weight : 8 lbs 5 oz  Apgars of 8 at 1 minute and 9 at 5 minutes.  Labor was induced.  Medications administered  in labor:  Pain Rx Epidural and Fentanyl; Antibiotics No  Perineum: Bilateral periurethral lacerations, hemostatic, left unrepaired  Placenta-mechanism: spontaneous, intact,  with a 3 vessel cord.  Quantitative Blood Loss was 470 mL  Complications of labor and delivery: Shoulder Dystocia   Anticipated Discharge Date: 18  Birth attendants: Olga Damian CNM and MAREN Arias    I, MAREN Enriquez, am serving as a scribe to document services personally performed by CNM based on the provider's statements to me.\" - MAREN Enriquez  The encounter was performed by me and scribed by the SNM. The scribed note accurately reflects my personal services and decisions made by me. Olga Damian, JAQUI PANIAGUA      Delivery Summary    Vandana Lagos MRN# [id number]   Age: 22 year old YOB: 1995     Labor Event Times:    Labor Onset Date       Labor Onset Time    Dilation Complete Date    Dilation Complete Time       Start Pushing Date        Start Pushing Time            Labor Length:    1st Stage (hrs/min) 6.00 47.00   2nd Stage (hrs/min) 0.00 16.00   3rd Stage (hrs/min) 0.00 9.00       Labor Events:     Labor No   Rupture Date     Rupture Time     Rupture Type Artificial Rupture of Membranes   Fluid Color     Labor Type     Induction    Induction Indication         Augmentation    Labor Complications     Additional Complications     Management of Labor        Antibiotics     IV Antibiotic Given     Additional Management     Fetal Status Prior to  Delivery   "   Fetal Status Comments         Cervical Ripening:    Date     Time     Type         Delivery:    Episiotomy None   Local Anesthetic        Lacerations None   Sponge Count Correct       Needle Count Correct     Final Count by:    Sutures     Blood loss (ml)    Packing Intentionally Left In     Number     Comments           Information for the patient's :  Shravan Lagos [[id number]]       Delivery  2018 10:58 AM by  Vaginal, Spontaneous Delivery  Sex:  female Gestational Age: 40w3d  Delivery Clinician:     Living?:            APGARS  One minute Five minutes Ten minutes   Skin color:            Heart rate:            Grimace:            Muscle tone:            Breathing:            Totals:              Presentation/position:           Resuscitation and Interventions: Method:     Oxygen Type:     Intubation Time:   # of Attempts:     ETT Size:        Tracheal Suction:     Tracheal returns:       Care at Delivery:           Cord information:     Disposition of cord blood:      Blood gases sent?    Complications:     Placenta: Delivered:           appearance.  Comments:  .  Disposition: Patient possesion  [place] Measurements:  Weight:    Height:    Head circumference:    Chest circumference:     Temperature:     Other providers:       Additional  information:  Forceps:    Verbal Informed Consent Obtained:       Alternative Labor Strategies Discussed:     Emergency Resources Available:       Type:       Accrued Pulling Time:       # of Pulls:      Position:     Fetal Station:       Indications:      Other Indications:     Operative Vaginal Delivery Brief Note Forceps:        Vacuum:    Verbal Informed Consent Obtained:     Alternative Labor Strategies Discussed:     Emergency Resources Available:     Type:      Accrued Pulling Time:       # of Pop-Offs:       # of Pulls:       Position:     Fetal Station:      Indications for Vacuum:       Other Indications:    Operative Vaginal Delivery  Brief Note Vacuum:        Shoulder Dystocia Shoulder Dystocia    Fetal Tracing Prior to Delivery:  Category 2   Shoulder dystocia present?:  Yes   Anterior shoulder:  right    Time recognized:  2018 1056    Time help called:  2018 105 Help called by:  SYEDA Damian CNM    Physician/Provider:  Called for physician but did not arrive prior to birth of baby   NICU arrived:  2018 1050 NICU staff:  already present for meconium stained fluid         First maneuver:  Jessie maneuver, Suprapubic pressure   Time performed:  2018 105    Performed by:  Valerie Boswell RN                                       Breech:       : Type:     Indications for Primary:     Indications for Secondary:     Other Indications:        Observed anomalies     Output in Delivery Room:

## 2018-07-20 NOTE — PLAN OF CARE
Data: Patient presented to [facility] at 2200.   Reason for maternal/fetal assessment per patient is Hypertension  .  Patient is a . Prenatal record reviewed.      Obstetric History       T1      L1     SAB0   TAB0   Ectopic0   Multiple0   Live Births1       # Outcome Date GA Lbr Nikolai/2nd Weight Sex Delivery Anes PTL Lv   2 Current            1 Term 17 40w1d 04:35 / 00:33 3.6 kg (7 lb 15 oz) M Vag-Spont EPI  TNEA      Name: ALDO CALDERONEVA      Complications: GBS,Preeclampsia/Hypertension      Apgar1:  8                Apgar5: 9      . Medical history:   Past Medical History:   Diagnosis Date     Preeclampsia 2017     Uncomplicated asthma    . Gestational Age 40w2d. VSS. Fetal movement present. Patient denies cramping, backache, vaginal discharge, pelvic pressure, UTI symptoms, GI problems, bloody show, vaginal bleeding, edema,  visual disturbances, epigastric pain, abdominal pain, rupture of membranes. Support person,  is  present.  Action: Verbal consent for EFM. Triage assessment completed. EFM applied for monitoring. Uterine assessment shows some irritability with occasional contractions. Fetal assessment: Presumed adequate fetal oxygenation documented (see flow record). Pt does report having headache earlier this morning and some URQ pain presently.  Response: HUNTER Haynes CNM informed of arrival. Plan per provider is admission for IOL. Patient verbalized agreement with plan. Patient transferred to room 444 ambulatory, oriented to room and call light.

## 2018-07-20 NOTE — PROGRESS NOTES
Data: Vandana Lagos transferred to [facility] via wheelchair at 1350. Baby transferred via parent's arms.  Action: Receiving unit notified of transfer: Yes. Patient and family notified of room change. Report given to [facility] at 1420. Belongings sent to receiving unit. Accompanied by Registered Nurse. Oriented patient to surroundings. Call light within reach. ID bands double-checked with receiving RN.  Response: Patient tolerated transfer and is stable.

## 2018-07-20 NOTE — PROGRESS NOTES
"Labor progress note    S:  Vandana is resting in bed. Was able to sleep for a little bit after one dose of IV Fentanyl. States her contractions are increasing in intensity and feels like she is ready for epidural. Moaning through contractions. Denies headache, vision changes, and epigastric pain.     O:  Blood pressure 133/84, pulse 108, temperature 98.6  F (37  C), temperature source Oral, resp. rate 18, height 1.753 m (5' 9\"), weight 101.2 kg (223 lb), last menstrual period 09/10/2017, unknown if currently breastfeeding.  General appearance: uncomfortable  Contractions: Every 2-3 minutes. 60-70 seconds duration.  Palpate: mild.  FHT: Baseline 140 with moderate variability. Accelerations present. No decelerations present.  ROM: not ruptured.  Pelvic exam: 4/90/-2    Pitocin- 8 mu/min.,  Antibiotics- none    # Pain Assessment:  Current Pain Score 7/20/2018   Patient currently in pain? yes   Pain location -   Pain descriptors -   Vandana s pain level was assessed and she is currently in pain from contractions. Ready for an epidural. Would like one dose of Fentanyl now while she is waiting for epidural.       A:  IUP @ 40w3d IOL for gestational hypertension. Pre-e labs WNL.  Thrombocytopenia, plts 145 on admit  Fetal Heart rate tracing category one  GBS- pending. Collected on 7/19.  + THC on admit Utox  Patient Active Problem List   Diagnosis     Labor and delivery indication for care or intervention     Labor and delivery, indication for care         P:  Continue induction with Pitocin.  Continue to monitor BPs and for signs of preeclampsia. Repeat labs if patient develops more severe BPs or other symptoms.  May have epidural now. IV fluid bolus running. Anesthesia has been notified.   MD consultant Dr. Kline on call and available prn.  Reevaluate in 2-4 hours and prn.     I, MAREN Enriquez, am serving as a scribe to document services personally performed by CNM based on the provider's statements to me.\" - " MAREN Enriquez  The encounter was performed by me and scribed by the SNM. The scribed note accurately reflects my personal services and decisions made by me. JAQUI Rouse CNM

## 2018-07-20 NOTE — TELEPHONE ENCOUNTER
Called patient to discuss evaluation in triage. Was sent from [facility] clinic in the afternoon for evaluation of elevated blood pressures and possible induction. At time of this note, [facility] had not [facility]. Left voicemail for patient to call back.

## 2018-07-20 NOTE — CONSULTS
[facility]  MATERNAL CHILD HEALTH   INITIAL PSYCHOSOCIAL ASSESSMENT     DATA:     Reason for Social Work Consult: Concern for homelessness during pregnancy, FOB demonstrating some controlling behaviors.    Presenting Information: Baby is a 40.3 week gestation infant born on 18. Parents are Vandana and Molly. Mom is .      Living Situation: Mom and Dad live together in an apartment. She states she moved out of [email or web address] in February.    Family Constellation: Mom and Dad have another child, Molly Amaya, who is 18 mos old.    Social Support: Mom reports they have good support from both sides of the family.    Education and Employment: Not assessed.    Insurance: "[employer] [place]    Source of Financial Support: Not assessed. Mom has no concerns at this time.      Mental Health History: Mom does not disclose a history of mental illness.    History of Postpartum Mood Disorders: None.    Chemical Health History: Mom tests positive for THC at delivery. States that she has been informed of the risks of baby's exposure during breastfeeding. MISSAEL filed report with [facility] for prenatal drug exposure. Molly: [phone], f. [phone].    Community Resources//Baby Supplies: Mom reports that she has all supplies necessary for caring for her infant.      INTERVENTION:       MISSAEL completed chart review and collaborated with the multidisciplinary team.     Psychosocial Assessment     Introduction to Maternal Child Health  role and scope of practice     Assessed Chemical Health History and Current Symptoms     Assessed Mental Health History and Current Symptoms     Identified stressors, barriers and family concerns     Provided psychoeducation on  mood and anxiety disorders, assessed for any current symptoms or history    Provided community resource postcard for Postpartum Support [place] ([facility]) - mom states she already  has one    ASSESSMENT:     Coping: adequate    Affect: appropriate, somewhat guarded    Mood: calm, appropriate    Motivation/Ability to Access Services: SW did not identify any barriers to accessing services; mom reports she has no needs/concerns at this time. Mom has a history of transitioning out of homeless, which suggests a level of resourcefulness and resilience.    Assessment of Support System: adequate, involved    Level of engagement with SW:  Engaged and appropriate. Able to seek out SW when needs arise.     Family and parent/infant interactions: Mom is holding baby during SW visit. Appears loving and is bonding appropriately.    Assessment of parental risk for PMAD:  Average risk.    Strengths: reported strong support system    Vulnerabilities: none identified at this time    Identified Barriers: None at this time     PLAN:     SW will continue to follow throughout pt's Maternal-Child Health Journey as needs arise. SW will continue to collaborate with the multidisciplinary team.    ISREAL Diaz, LGSW    [facility]  Phone: [phone]  Pager: [phone]

## 2018-07-20 NOTE — PROGRESS NOTES
"Blood pressure 122/81, pulse 108, temperature 98.2  F (36.8  C), temperature source Oral, resp. rate 16, height 1.753 m (5' 9\"), weight 101.2 kg (223 lb), last menstrual period 09/10/2017, unknown if currently breastfeeding.    General appearance: comfortable, resting in bed.  CONTRACTIONS: mild and every 3-5 minutes  Pitocin- 6 mu/min.,  Antibiotics- none  FETAL HEART TONES: continuous EFM- baseline 130 with moderate variability and positive accelerations. No decelerations.  ROM: not ruptured  PELVIC EXAM:deferred    # Pain Assessment:  Current Pain Score 7/20/2018   Patient currently in pain? yes   Pain location Head   Pain descriptors Ines benton pain level was assessed and she currently denies pain.      ASSESSMENT:  ==============  IUP @ 40w3d for induction of labor.  Indication: gestational hypertension   Fetal Heart Rate Tracing category one  GBS- pending    PLAN:  ===========  Ambulation, hydration, position changes, birthing ball/sling and tub options to facilitate labor.  Reevaluate in 2-4 hours prn  Continue labor induction with Pitocin  MD consultant on call Dr. Multani/ available prn     JAQUI Torres CNM    "

## 2018-07-20 NOTE — PLAN OF CARE
Problem: Patient Care Overview  Goal: Plan of Care/Patient Progress Review  Outcome: Improving  Patient is becoming uncomfortable, requesting Epidural, midwife and student midwife notified, her cervix is dilated to 4cm, IV Fentenyl given with no relieve. Waiting Anesthesiologist.

## 2018-07-20 NOTE — ANESTHESIA PREPROCEDURE EVALUATION
Anesthesia Evaluation     .             ROS/MED HX    ENT/Pulmonary:     (+)tobacco use, Past use asthma , . .    Neurologic:       Cardiovascular:     (+) hypertension----. : . . . :. .       METS/Exercise Tolerance:     Hematologic:         Musculoskeletal:         GI/Hepatic: Comment: Pre-eclampsia        Renal/Genitourinary:         Endo:         Psychiatric:         Infectious Disease:         Malignancy:         Other:    (+) Possibly pregnant                  Procedure: * No procedures listed *    HPI: Vandana Lagos is a 22 year old female who presents for labor epidural.    PMHx/PSHx:  Past Medical History:   Diagnosis Date     Preeclampsia 2/20/2017     Uncomplicated asthma        History reviewed. No pertinent surgical history.      No current facility-administered medications on file prior to encounter.   Current Outpatient Prescriptions on File Prior to Encounter:  acetaminophen (TYLENOL) 325 MG tablet Take 2 tablets (650 mg) by mouth every 6 hours as needed for mild pain or fever (greater than or equal to 38 C /100.4F (oral))   albuterol (ALBUTEROL) 108 (90 BASE) MCG/ACT Inhaler Inhale 2 puffs into the lungs every 6 hours   Flunisolide HFA 80 MCG/ACT AERS    Prenatal Vit-Fe Fumarate-FA (PRENATAL VITAMINS) 28-0.8 MG TABS    VITAMIN D, CHOLECALCIFEROL, PO Take 5,000 Units by mouth daily       Social Hx:   Social History   Substance Use Topics     Smoking status: Former Smoker     Quit date: 1/19/2017     Smokeless tobacco: Never Used     Alcohol use No       Allergies: No Known Allergies      NPO Status: Per ASA Guidelines    Labs:    Blood Bank:  Lab Results   Component Value Date    ABO O 07/19/2018    RH Neg 07/19/2018    AS Neg 07/12/2016     BMP:  Recent Labs   Lab Test  07/19/18   2240   CR  0.46*     CBC:   Recent Labs   Lab Test  07/19/18   2240   WBC  6.7   RBC  4.06   HGB  11.5*   HCT  36.1   MCV  89   MCH  28.3   MCHC  31.9   RDW  13.5   PLT  145*     Coags:  No results for input(s): INR,  PTT, FIBR in the last 16203 hours.          Physical Exam  Normal systems: cardiovascular, pulmonary and dental    Airway   Mallampati: IV  TM distance: >3 FB  Neck ROM: full    Dental     Cardiovascular       Pulmonary                     Anesthesia Plan  Procedure only, no anesthetic delivered    History & Physical Review      ASA Status:  2 .        Plan for Epidural     Agree with plan for epidural.      Postoperative Care      Consents

## 2018-07-20 NOTE — PLAN OF CARE
Problem: Patient Care Overview  Goal: Plan of Care/Patient Progress Review  Outcome: Improving  Labor Shift Note  Data: Contraction frequency q 2-5 minutes and irregular, palpate mild. Fetal assessment AGA, moderate variability, accelerations present, decels absent.   Vitals:    18 0100 18 0235 18 0355 18 0430   BP: 122/81 134/82  141/89   Pulse:       Resp: 16 16 16    Temp:   98.4  F (36.9  C)    TempSrc:   Oral    Weight:       Height:       . I/O this shift:  In: 1302.84 [P.O.:500; I.V.:802.84]  Out: 675 [Urine:675].  Membranes intact.  Signs and symptoms of infection absent.  Blood pressures WNL. Signs and symptoms of pre-eclampsia: present, ankle edema and headache.Reflexes 1+, no clonus present.  Support person Terian present.  Interventions: Continue uterine/fetal assessment, vital signs, and pitocin titration per order set. Pt medicated with Fentanyl x1 with good relief. Planning on getting an epidural when more uncomfortable.     Plan: Anticipate . Provide labor/coping assistance as needed by patient and support person.  Observe for and notify care provider of indications of progressing labor, need for pain medications,  or signs of fetal/maternal compromise.

## 2018-07-20 NOTE — PROGRESS NOTES
"Labor progress note    S:  Vandana has gotten her epidural and is semi-ramirez's in bed. Partner Tarian at bedside. Moaning through contractions and states she feels rectal pressure. Able to rest in between. Desires AROM after discussion of risks and benefits.     O:  Blood pressure (!) 139/92, pulse 108, temperature 98.6  F (37  C), temperature source Oral, resp. rate 18, height 1.753 m (5' 9\"), weight 101.2 kg (223 lb), last menstrual period 09/10/2017, SpO2 98 %, unknown if currently breastfeeding.  General appearance: uncomfortable with contractions.  Contractions: Every 2 minutes. 80-90 seconds duration.  Palpate: strong.  FHT: Baseline 125 with moderate variability. Accelerations present. No decelerations present.  ROM: AROM for moderate amount of meconium stained fluid  Pelvic exam: / Anterior/ soft/ 0    Pitocin- 8 mu/min.,  Antibiotics- none    # Pain Assessment:  Current Pain Score 2018   Patient currently in pain? yes   Pain location Pelvis   Pain descriptors -   - Vandana is experiencing pain due to contractions and rectal pressure. Pain management was discussed with Vandana and her partner and the plan was created in a collaborative fashion.  Vandana's response to the current recommendations: engaged  - Has epidural. Contractions are starting to become less painful.       A:  IUP @ 40w3d IOL for gestational hypertension. Pre-e labs WNL  Fetal Heart rate tracing category one  GBS- pending, collected   + THC on admit Utox  Patient Active Problem List   Diagnosis     Labor and delivery indication for care or intervention     Labor and delivery, indication for care         P:  Discussed meconium stained fluid. Plan NICU attendance for delivery. They have been notified.   Begin pushing once cervix is 10 cm dilated.   Prepare room for delivery.  Anticipate .     I, MAREN Enriquez, am serving as a scribe to document services personally performed by CNM based on the provider's statements " "to me.\" - Marycruz Murillo, MAREN  The encounter was performed by me and scribed by the SNM. The scribed note accurately reflects my personal services and decisions made by me. JAQUI Rouse CNM        "

## 2018-07-20 NOTE — PLAN OF CARE
Patient arrived to [facility] unit via wheel chair with belongings, accompanied by Amelia HERNANDEZ , with infant in arms. Got report from amelia HIDALGO  and checked bands. Unit and room orientation done. No concerns a this time. Continue with plan of care.

## 2018-07-20 NOTE — PLAN OF CARE
Problem: Patient Care Overview  Goal: Plan of Care/Patient Progress Review  Outcome: Improving  Patient's labor progressed well. After getting epidural patient stated that she felt the urge to push, Midwife was called in to the room. At 1042 patient was complete and started pushing, the head was delivered at 1056 and shoulder dystocia was noted, Dr. Kline was called in to the room and NICU was already in the room for Meconium stained fluid. Jessie Manuevers and suprapubic pressure was applied the body was delivered at 1058 after 80 seconds of shoulder dystocia. Cancelled the call for Dr. Kline due to the baby delivered. Viable baby girl was handed off to NICU for resuscitations. See delivery summary for NICU note.

## 2018-07-20 NOTE — ANESTHESIA PROCEDURE NOTES
Epidural Procedure Note    Staff:     Anesthesiologist:  ZACHARY ARNOLD    Resident/CRNA:  ZHAO ABDI    Procedure performed by resident/CRNA in the presence of a teaching physician    Location: OB     Procedure start time:  7/20/2018 9:40 AM     Procedure end time:  7/20/2018 10:04 AM   Pre-procedure checklist:   patient identified, IV checked, site marked, risks and benefits discussed, informed consent, monitors and equipment checked, pre-op evaluation, at physician/surgeon's request and post-op pain management      Correct Patient: Yes      Correct Position: Yes      Correct Site: Yes      Correct Procedure: Yes      Correct Laterality:  Yes    Site Marked:  Yes  Procedure:     Procedure:  Epidural catheter    ASA:  2    Position:  Sitting    Sterile Prep: chloraprep, mask, sterile gloves and patient draped      Insertion site:  L3-4    Local skin infiltration:  1% lidocaine    Approach:  Midline    Needle gauge (G):  17    Needle Length (in):  3.5    Block Needle Type:  Touhdane    Injection Technique:  LORT saline    TAMIKO at (cm):  7    Attempts:  1    Redirects:  1    Catheter gauge (G):  19    Catheter threaded easily: Yes      Threaded to cm at skin:  12    Paresthesias:  No    Aspiration negative for Heme or CSF: Yes      Test dose (mL):  3    Test dose time:  09:54    Test dose negative for signs of intravascular, subdural or intrathecal injection: Yes

## 2018-07-21 VITALS
WEIGHT: 223 LBS | OXYGEN SATURATION: 99 % | DIASTOLIC BLOOD PRESSURE: 93 MMHG | HEART RATE: 71 BPM | BODY MASS INDEX: 33.03 KG/M2 | TEMPERATURE: 98.2 F | HEIGHT: 69 IN | SYSTOLIC BLOOD PRESSURE: 141 MMHG | RESPIRATION RATE: 17 BRPM

## 2018-07-21 LAB — HGB BLD-MCNC: 11.3 G/DL (ref 11.7–15.7)

## 2018-07-21 PROCEDURE — 36415 COLL VENOUS BLD VENIPUNCTURE: CPT | Performed by: ADVANCED PRACTICE MIDWIFE

## 2018-07-21 PROCEDURE — 85018 HEMOGLOBIN: CPT | Performed by: ADVANCED PRACTICE MIDWIFE

## 2018-07-21 PROCEDURE — 86900 BLOOD TYPING SEROLOGIC ABO: CPT | Performed by: ADVANCED PRACTICE MIDWIFE

## 2018-07-21 PROCEDURE — 85461 HEMOGLOBIN FETAL: CPT | Performed by: ADVANCED PRACTICE MIDWIFE

## 2018-07-21 PROCEDURE — 25000132 ZZH RX MED GY IP 250 OP 250 PS 637: Performed by: ADVANCED PRACTICE MIDWIFE

## 2018-07-21 PROCEDURE — 86901 BLOOD TYPING SEROLOGIC RH(D): CPT | Performed by: ADVANCED PRACTICE MIDWIFE

## 2018-07-21 PROCEDURE — 25000128 H RX IP 250 OP 636: Performed by: ADVANCED PRACTICE MIDWIFE

## 2018-07-21 RX ORDER — IBUPROFEN 800 MG/1
800 TABLET, FILM COATED ORAL
Qty: 90 TABLET | Refills: 0 | Status: SHIPPED | OUTPATIENT
Start: 2018-07-21 | End: 2021-03-09

## 2018-07-21 RX ORDER — AMOXICILLIN 250 MG
1 CAPSULE ORAL 2 TIMES DAILY
Qty: 90 TABLET | Refills: 0 | Status: SHIPPED | OUTPATIENT
Start: 2018-07-21 | End: 2021-03-09

## 2018-07-21 RX ADMIN — ACETAMINOPHEN 650 MG: 325 TABLET, FILM COATED ORAL at 07:35

## 2018-07-21 RX ADMIN — HUMAN RHO(D) IMMUNE GLOBULIN 300 MCG: 300 INJECTION, SOLUTION INTRAMUSCULAR at 09:02

## 2018-07-21 RX ADMIN — IBUPROFEN 800 MG: 800 TABLET, FILM COATED ORAL at 02:03

## 2018-07-21 RX ADMIN — IBUPROFEN 800 MG: 800 TABLET, FILM COATED ORAL at 07:35

## 2018-07-21 RX ADMIN — SENNOSIDES AND DOCUSATE SODIUM 1 TABLET: 8.6; 5 TABLET ORAL at 07:35

## 2018-07-21 RX ADMIN — IBUPROFEN 800 MG: 800 TABLET, FILM COATED ORAL at 13:35

## 2018-07-21 NOTE — PROGRESS NOTES
Social work consulted re: staff concerns of Vandana's significant other and concern for controlling behaviors.     Met privately with Vandana. She voiced being irritated that staff view her significant other's actions as controlling, states she had this type of conversation with staff when she was hospitalized in 2/2017. She does not view his actions or behaviors as controlling. She has no concerns about her safety or well being. Denied having need for any resources, since she feels safe with him. Vandana states she will discharge home soon, and had no concerns or needs to address with SW.

## 2018-07-21 NOTE — PLAN OF CARE
Problem: Patient Care Overview  Goal: Plan of Care/Patient Progress Review  Outcome: Improving  Data: Vital signs and postpartum checks WDL except BP slightly elevated - see flow record. No complain of HA, dizziness or SOB. Patient eating and drinking normally. Patient able to empty bladder independently and is up ambulating.  Patient performing self cares and is able to care for infant. Breastfeeding on demand.   Action: Patient medicated during the shift for pain with Tylenol and Ibuprofen. Patient education done see education record.  Response: Positive attachment behaviors observed with infant. Support persons  present.    Plan: Continue with the plan of care and notify provider if decline in status. Will continue to monitor and assess.

## 2018-07-21 NOTE — PLAN OF CARE
Problem: Patient Care Overview  Goal: Plan of Care/Patient Progress Review  Outcome: Adequate for Discharge  Patient's postpartum assessment WDL, vital signs stable but BP still a bit elevated (130s-140s/90s). Fundus firm and midline, lochia WDL. Breastfeeds infant well on cue, latch-on verified. Bonding well with infant. Taking tylenol and ibuprofen for pain control, adequate per patient. Rhogam administrated. Breast pump given. Discharging to home with s/o and infant. Plan to follow up in clinic on Monday for BP check, per King ARCELIA.

## 2018-07-21 NOTE — DISCHARGE SUMMARY
Postpartum Note and Discharge Summary  Postpartum Day #1  SIGNIFICANT PROBLEMS:  Patient Active Problem List    Diagnosis Date Noted     Labor and delivery, indication for care 2018     Priority: Medium     Labor and delivery indication for care or intervention 2017     Priority: Medium     ADMISSION DIAGNOSIS:  Labor and Delivery  DISCHARGE DIAGNOSIS:    HOSPITAL COURSE:  40w3d  Vandana Lagos is a 22 year old female     Pt was admitted to the [facility] on 2018  9:41 PM in for induction of labor.  Indication gHTN.     Delivery Note:  Brief Labor Course: Vandana presented to [facility] on 18 for IOL for gestational hypertension. She was seen at [facility] clinic earlier that day for routine prenatal visit and was noted to have elevated BP. She also reported a headache that spontaneously resolved and RUQ pain. Has history of preeclampsia with first baby. Pre-eclampsia labs were WNL upon admit. Cervix was 2.5/60/-3, and Pitocin was started. Vandana became uncomfortable around 0430 and received one dose of IV Fentanyl. Requested epidural around 0830 when she was 4/90/-2. Received one more dose of IV Fentanyl prior to her epidural. Felt rectal pressure after her epidural; bladder drained with straight cath and cervix was found to be 8/90/0. Offered AROM and patient consented after risks/benefits discussion. AROM for moderate amount of meconium stained fluid. Continued to feel urge to push, and was found to be 10/100/+2.   Delivery Note:   Vandana began pushing in semi-ramirez's position. Made good progress with guided pushing. NICU present for birth. Slow  of fetal head noted, staff prepared for possible shoulder dystocia. Head delivered in the OA position at 1056. No nuchal cord identified. Shoulder dystocia was identified at that time and made known to staff in room. Called for McRobert's position, suprapubic pressure, and OB to room. McRobert's and suprapubic pressure were  "performed at 1057, and anterior shoulder was delivered after. The rest of body delivered with ease, and viable baby girl was born at 1058. Baby was non-vigorous; cord was immediately clamped and cut and baby was transferred to warm where NICU assumed cares. Cord segment cut for cord gases. Patient consented to active management of third stage with IV Pitocin, which was started after delivery of baby. Placenta delivered spontaneously via Susana mechanism; intact with 3 vessel cord. Vandana desires to take placenta home. Fundus firm and centered, bleeding minimal. Perineum inspected and found to be intact. Shallow bilateral periurethral lacerations identified; they were hemostatic and did not require sutures. Baby stable and placed on maternal chest.   IUP at 40 weeks 3 days gestation delivered on 2018.     delivery of a viable Female infant.  Weight : 8 lbs 5 oz  Apgars of 8 at 1 minute and 9 at 5 minutes.  Labor was induced.  Medications administered  in labor:  Pain Rx Epidural and Fentanyl; Antibiotics No  Perineum: Bilateral periurethral lacerations, hemostatic, left unrepaired  Placenta-mechanism: spontaneous, intact,  with a 3 vessel cord.  Quantitative Blood Loss was 470 mL  Complications of labor and delivery: Shoulder Dystocia   Anticipated Discharge Date: 18  Birth attendants: Olga Damian CNM and MAREN Arias    INTERVAL HISTORY:  BP (!) 141/93  Pulse 71  Temp 98.2  F (36.8  C) (Oral)  Resp 17  Ht 1.753 m (5' 9\")  Wt 101.2 kg (223 lb)  LMP 09/10/2017  SpO2 99%  Breastfeeding? Unknown  BMI 32.93 kg/m2  Pt stable, baby is rooming in.   Breast feeding status: initiated and going well. Denies any issues with latch or pain throughout nursing.  Complications since 2 hours post delivery: None  Patient is tolerating activity well, Voiding without difficulty, BM without difficulty or discomfort. Cramping is minimal and relieved by ibuprofen, lochia is decreasing and patient denies " clots.  Perineal pain is minimal.  The perineum is intact.     Physical Exam:   Breasts: soft, nontender, nipples intact  Abdomen: soft, nontender, fundus at U  Lochia: small amount, rubra, no clots or odor  Perineum: intact  Extremities: trace edema    Postpartum hemoglobin   Hemoglobin   Date Value Ref Range Status   2018 11.3 (L) 11.7 - 15.7 g/dL Final      Prenatal Labs:   Lab Results   Component Value Date    ABO O 2018    RH Neg 2018    AS Neg 2016    HEPBANG Non-reactive 2016    TREPAB Negative 2017    RUQIGG 40 2016     Rubella: immune  History of depression: denies. Postpartum depression warning signs reviewed.    ASSESSMENT/PLAN:  Discussed mild range blood pressures. Pt strongly desires day 1 discharge. Plan to follow-up in clinic on Monday () for blood pressure check.  Normal postpartum exam, stable postpartum day #1  Complications:none  Plan d/c home today. Home Visit Ordered- No  RTC on Monday,  for BP check. RTC in 6 weeks for routine postpartum  Postpartum warning s/s reviewed, including bleeding/clots, fever, mastitis, or depression. Also reviewed pre-eclampsia signs/symptoms.  Continue prenatal vitamins. Ibuprofen and stool softener prn.  Birthcontrol planned:Undecided.  Discussed and strongly encouraged LARC method. Offered Depo prior to discharge, pt declines at this time.    PROCEDURES:      Current Discharge Medication List      START taking these medications    Details   ibuprofen (ADVIL/MOTRIN) 800 MG tablet Take 1 tablet (800 mg) by mouth once as needed for moderate pain (mild-moderate pain)  Qty: 90 tablet, Refills: 0    Associated Diagnoses:  (normal spontaneous vaginal delivery)      senna-docusate (SENOKOT-S;PERICOLACE) 8.6-50 MG per tablet Take 1 tablet by mouth 2 times daily  Qty: 90 tablet, Refills: 0    Associated Diagnoses:  (normal spontaneous vaginal delivery)         CONTINUE these medications which have NOT CHANGED     Details   acetaminophen (TYLENOL) 325 MG tablet Take 2 tablets (650 mg) by mouth every 6 hours as needed for mild pain or fever (greater than or equal to 38 C /100.4F (oral))  Qty: 100 tablet, Refills: 0    Associated Diagnoses: Vaginal delivery      albuterol (ALBUTEROL) 108 (90 BASE) MCG/ACT Inhaler Inhale 2 puffs into the lungs every 6 hours      Flunisolide HFA 80 MCG/ACT AERS       Prenatal Vit-Fe Fumarate-FA (PRENATAL VITAMINS) 28-0.8 MG TABS       VITAMIN D, CHOLECALCIFEROL, PO Take 5,000 Units by mouth daily           JAQUI Torres CNM

## 2018-07-22 LAB
ABO + RH BLD: NORMAL
ABO + RH BLD: NORMAL
BLOOD BANK CMNT PATIENT-IMP: NORMAL
DATE RH IMM GL GVN: NORMAL
FETAL CELL SCN BLD QL ROSETTE: NORMAL
RH IG VIALS RECOM PATIENT: NORMAL

## 2018-07-25 LAB — THC UR QL CFM: NORMAL

## 2019-02-18 NOTE — ADDENDUM NOTE
Addendum  created 02/18/19 1032 by Therese Heath MD    Intraprocedure Attestations filed, Intraprocedure Event edited

## 2020-10-28 NOTE — PLAN OF CARE
Problem: Patient Care Overview  Goal: Plan of Care/Patient Progress Review  Outcome: No Change  No change of condition. Patient stable. Denies pain. Currently resting. Will continue with current plan of care.        - secondary to GI bleed  - see management plan above

## 2021-01-24 ENCOUNTER — HOSPITAL ENCOUNTER (EMERGENCY)
Facility: CLINIC | Age: 26
Discharge: HOME | End: 2021-01-25
Attending: EMERGENCY MEDICINE
Payer: COMMERCIAL

## 2021-01-24 DIAGNOSIS — N30.00 ACUTE CYSTITIS WITHOUT HEMATURIA: ICD-10-CM

## 2021-01-24 DIAGNOSIS — K29.00 ACUTE GASTRITIS WITHOUT HEMORRHAGE, UNSPECIFIED GASTRITIS TYPE: ICD-10-CM

## 2021-01-24 DIAGNOSIS — N76.0 BACTERIAL VAGINOSIS: ICD-10-CM

## 2021-01-24 DIAGNOSIS — B96.89 BACTERIAL VAGINOSIS: ICD-10-CM

## 2021-01-24 LAB
BASOPHILS # BLD AUTO: 0.1 10E9/L (ref 0–0.2)
BASOPHILS NFR BLD AUTO: 0.8 %
DIFFERENTIAL METHOD BLD: NORMAL
EOSINOPHIL # BLD AUTO: 0.1 10E9/L (ref 0–0.7)
EOSINOPHIL NFR BLD AUTO: 1.1 %
ERYTHROCYTE [DISTWIDTH] IN BLOOD BY AUTOMATED COUNT: 12.6 % (ref 10–15)
HCT VFR BLD AUTO: 37.2 % (ref 35–47)
HGB BLD-MCNC: 12.4 G/DL (ref 11.7–15.7)
IMM GRANULOCYTES # BLD: 0 10E9/L (ref 0–0.4)
IMM GRANULOCYTES NFR BLD: 0.2 %
LYMPHOCYTES # BLD AUTO: 2.9 10E9/L (ref 0.8–5.3)
LYMPHOCYTES NFR BLD AUTO: 46.7 %
MCH RBC QN AUTO: 29.3 PG (ref 26.5–33)
MCHC RBC AUTO-ENTMCNC: 33.3 G/DL (ref 31.5–36.5)
MCV RBC AUTO: 88 FL (ref 78–100)
MONOCYTES # BLD AUTO: 0.5 10E9/L (ref 0–1.3)
MONOCYTES NFR BLD AUTO: 7.3 %
NEUTROPHILS # BLD AUTO: 2.8 10E9/L (ref 1.6–8.3)
NEUTROPHILS NFR BLD AUTO: 43.9 %
NRBC # BLD AUTO: 0 10*3/UL
NRBC BLD AUTO-RTO: 0 /100
PLATELET # BLD AUTO: 236 10E9/L (ref 150–450)
RBC # BLD AUTO: 4.23 10E12/L (ref 3.8–5.2)
WBC # BLD AUTO: 6.3 10E9/L (ref 4–11)

## 2021-01-24 PROCEDURE — 81025 URINE PREGNANCY TEST: CPT | Performed by: EMERGENCY MEDICINE

## 2021-01-24 PROCEDURE — 99285 EMERGENCY DEPT VISIT HI MDM: CPT | Performed by: EMERGENCY MEDICINE

## 2021-01-24 PROCEDURE — 87088 URINE BACTERIA CULTURE: CPT | Performed by: EMERGENCY MEDICINE

## 2021-01-24 PROCEDURE — 83690 ASSAY OF LIPASE: CPT | Performed by: EMERGENCY MEDICINE

## 2021-01-24 PROCEDURE — 80053 COMPREHEN METABOLIC PANEL: CPT | Performed by: EMERGENCY MEDICINE

## 2021-01-24 PROCEDURE — 87086 URINE CULTURE/COLONY COUNT: CPT | Performed by: EMERGENCY MEDICINE

## 2021-01-24 PROCEDURE — 81001 URINALYSIS AUTO W/SCOPE: CPT | Performed by: EMERGENCY MEDICINE

## 2021-01-24 PROCEDURE — 87186 SC STD MICRODIL/AGAR DIL: CPT | Performed by: EMERGENCY MEDICINE

## 2021-01-24 PROCEDURE — 85025 COMPLETE CBC W/AUTO DIFF WBC: CPT | Performed by: EMERGENCY MEDICINE

## 2021-01-24 RX ORDER — SODIUM CHLORIDE 9 MG/ML
INJECTION, SOLUTION INTRAVENOUS CONTINUOUS
Status: DISCONTINUED | OUTPATIENT
Start: 2021-01-25 | End: 2021-01-25 | Stop reason: HOSPADM

## 2021-01-25 ENCOUNTER — APPOINTMENT (OUTPATIENT)
Dept: GENERAL RADIOLOGY | Facility: CLINIC | Age: 26
End: 2021-01-25
Attending: EMERGENCY MEDICINE
Payer: COMMERCIAL

## 2021-01-25 ENCOUNTER — APPOINTMENT (OUTPATIENT)
Dept: ULTRASOUND IMAGING | Facility: CLINIC | Age: 26
End: 2021-01-25
Attending: EMERGENCY MEDICINE
Payer: COMMERCIAL

## 2021-01-25 VITALS
OXYGEN SATURATION: 99 % | DIASTOLIC BLOOD PRESSURE: 82 MMHG | HEART RATE: 94 BPM | TEMPERATURE: 97.6 F | SYSTOLIC BLOOD PRESSURE: 126 MMHG | RESPIRATION RATE: 16 BRPM

## 2021-01-25 LAB
ALBUMIN SERPL-MCNC: 3.6 G/DL (ref 3.4–5)
ALBUMIN UR-MCNC: 10 MG/DL
ALP SERPL-CCNC: 75 U/L (ref 40–150)
ALT SERPL W P-5'-P-CCNC: 15 U/L (ref 0–50)
ANION GAP SERPL CALCULATED.3IONS-SCNC: 4 MMOL/L (ref 3–14)
APPEARANCE UR: CLEAR
AST SERPL W P-5'-P-CCNC: 9 U/L (ref 0–45)
BILIRUB SERPL-MCNC: 0.2 MG/DL (ref 0.2–1.3)
BILIRUB UR QL STRIP: NEGATIVE
BUN SERPL-MCNC: 14 MG/DL (ref 7–30)
CALCIUM SERPL-MCNC: 8.9 MG/DL (ref 8.5–10.1)
CHLORIDE SERPL-SCNC: 110 MMOL/L (ref 94–109)
CO2 SERPL-SCNC: 29 MMOL/L (ref 20–32)
COLOR UR AUTO: YELLOW
CREAT SERPL-MCNC: 0.65 MG/DL (ref 0.52–1.04)
GFR SERPL CREATININE-BSD FRML MDRD: >90 ML/MIN/{1.73_M2}
GLUCOSE SERPL-MCNC: 86 MG/DL (ref 70–99)
GLUCOSE UR STRIP-MCNC: NEGATIVE MG/DL
HCG UR QL: NEGATIVE
HGB UR QL STRIP: NEGATIVE
KETONES UR STRIP-MCNC: NEGATIVE MG/DL
LEUKOCYTE ESTERASE UR QL STRIP: ABNORMAL
LIPASE SERPL-CCNC: 73 U/L (ref 73–393)
MUCOUS THREADS #/AREA URNS LPF: PRESENT /LPF
NITRATE UR QL: NEGATIVE
PH UR STRIP: 6 PH (ref 5–7)
POTASSIUM SERPL-SCNC: 3.5 MMOL/L (ref 3.4–5.3)
PROT SERPL-MCNC: 6.7 G/DL (ref 6.8–8.8)
RBC #/AREA URNS AUTO: 1 /HPF (ref 0–2)
SODIUM SERPL-SCNC: 143 MMOL/L (ref 133–144)
SOURCE: ABNORMAL
SP GR UR STRIP: 1.03 (ref 1–1.03)
SQUAMOUS #/AREA URNS AUTO: 2 /HPF (ref 0–1)
UROBILINOGEN UR STRIP-MCNC: NORMAL MG/DL (ref 0–2)
WBC #/AREA URNS AUTO: 12 /HPF (ref 0–5)

## 2021-01-25 PROCEDURE — 96374 THER/PROPH/DIAG INJ IV PUSH: CPT | Performed by: EMERGENCY MEDICINE

## 2021-01-25 PROCEDURE — 96361 HYDRATE IV INFUSION ADD-ON: CPT | Performed by: EMERGENCY MEDICINE

## 2021-01-25 PROCEDURE — 250N000013 HC RX MED GY IP 250 OP 250 PS 637: Performed by: EMERGENCY MEDICINE

## 2021-01-25 PROCEDURE — 76705 ECHO EXAM OF ABDOMEN: CPT

## 2021-01-25 PROCEDURE — 250N000009 HC RX 250: Performed by: EMERGENCY MEDICINE

## 2021-01-25 PROCEDURE — 258N000003 HC RX IP 258 OP 636: Performed by: EMERGENCY MEDICINE

## 2021-01-25 PROCEDURE — 250N000011 HC RX IP 250 OP 636: Performed by: EMERGENCY MEDICINE

## 2021-01-25 PROCEDURE — 74019 RADEX ABDOMEN 2 VIEWS: CPT

## 2021-01-25 PROCEDURE — C9113 INJ PANTOPRAZOLE SODIUM, VIA: HCPCS | Performed by: EMERGENCY MEDICINE

## 2021-01-25 RX ORDER — SUCRALFATE 1 G/1
1 TABLET ORAL 4 TIMES DAILY PRN
Qty: 28 TABLET | Refills: 0 | Status: SHIPPED | OUTPATIENT
Start: 2021-01-25 | End: 2021-02-01

## 2021-01-25 RX ORDER — PANTOPRAZOLE SODIUM 40 MG/1
40 TABLET, DELAYED RELEASE ORAL DAILY
Qty: 14 TABLET | Refills: 0 | Status: SHIPPED | OUTPATIENT
Start: 2021-01-25 | End: 2021-02-08

## 2021-01-25 RX ORDER — CEPHALEXIN 500 MG/1
500 CAPSULE ORAL 2 TIMES DAILY
Qty: 10 CAPSULE | Refills: 0 | Status: SHIPPED | OUTPATIENT
Start: 2021-01-25 | End: 2021-01-30

## 2021-01-25 RX ADMIN — PANTOPRAZOLE SODIUM 40 MG: 40 INJECTION, POWDER, FOR SOLUTION INTRAVENOUS at 00:27

## 2021-01-25 RX ADMIN — LIDOCAINE HYDROCHLORIDE 30 ML: 20 SOLUTION ORAL; TOPICAL at 00:26

## 2021-01-25 RX ADMIN — SODIUM CHLORIDE 1000 ML: 9 INJECTION, SOLUTION INTRAVENOUS at 00:28

## 2021-01-25 NOTE — RESULT ENCOUNTER NOTE
Emergency Dept/Urgent Care discharge antibiotic (if prescribed): Cephalexin (Keflex) 500 mg capsule, 1 capsule (500 mg) by mouth 2 times daily for 5 days.  Date of Rx (if applicable):  1/25/21  No changes in treatment per Urine culture protocol.

## 2021-01-25 NOTE — ED PROVIDER NOTES
[facility] [place]    1/24/21        History     Chief Complaint   Patient presents with     Pelvic Pain     Patient presents to ED with c/o pelvic pain that is worse with intercourse. Sxs onset 3 days ago.      Abdominal Pain     The history is provided by the patient and medical records.     Vandana Lagos is a 25 year old female with a history of asthma who presents to the Emergency Department with epigastric abdominal pain. Patient reports epigastric abdominal pain beginning Friday night, 1/22. She states it feels like burning and knots. Patient reports the pain is worse at night and after eating greasy foods. Patient reports she had heartburn during her pregnancy. She denies pain going into her chest. No history of gallstones or ulcers. Patient reports she thought she was constipated previously and took GasX. She states she was able to have a BM after that and had one today. No diarrhea, melena, hematochezia or hematemesis. No nausea or vomiting. Of note, patient was recently seen at [facility] clinic and was diagnosed with bacterial vaginosis. She states she has not been able to get her prescription of Metronidazole yet. Patient reports she has been having pain during intercourse but no other pelvic or vaginal pain. No vaginal discharge or itching. Patient reports she has been feeling pressure when trying to urinate and feels a tingling sensation after she is finished. She reports having a pelvic exam at clinic and the remainder of her STD testing was negative. She reports her main reason for evaluation today is the epigastric pain and she plans to  her bacterial vaginosis treatment tomorrow. She denies hematuria. Patient denies chance of pregnancy, states she recently had a negative test. Patient denies recent ibuprofen use. No alcohol or drug use. No caffeine use. Patient denies known COVID exposure. No fever, chills, cough, or shortness of breath.    Past Medical  History  Past Medical History:   Diagnosis Date     Preeclampsia 2017     Uncomplicated asthma      History reviewed. No pertinent surgical history.       albuterol (ALBUTEROL) 108 (90 BASE) MCG/ACT Inhaler       acetaminophen (TYLENOL) 325 MG tablet       Flunisolide HFA 80 MCG/ACT AERS       ibuprofen (ADVIL/MOTRIN) 800 MG tablet       Prenatal Vit-Fe Fumarate-FA (PRENATAL VITAMINS) 28-0.8 MG TABS       senna-docusate (SENOKOT-S;PERICOLACE) 8.6-50 MG per tablet       VITAMIN D, CHOLECALCIFEROL, PO      No Known Allergies  Family History  No family history on file.  Social History   Social History     Tobacco Use     Smoking status: Former Smoker     Quit date: 2017     Years since quittin.0     Smokeless tobacco: Never Used   Substance Use Topics     Alcohol use: No     Drug use: No      Past medical history, past surgical history, medications, allergies, family history, and social history were reviewed with the patient. No additional pertinent items.       Review of Systems  A complete review of systems was performed with pertinent positives and negatives noted in the HPI, and all other systems negative.    Physical Exam   BP: (!) 152/91  Pulse: 95  Temp: 97.8  F (36.6  C)  Resp: 16  SpO2: 98 %  Physical Exam  Vitals signs reviewed.   Constitutional:       General: She is not in acute distress.     Appearance: She is well-developed.   HENT:      Head: Normocephalic and atraumatic.      Mouth/Throat:      Mouth: Mucous membranes are moist.      Pharynx: No oropharyngeal exudate.   Eyes:      Extraocular Movements: Extraocular movements intact.      Pupils: Pupils are equal, round, and reactive to light.   Neck:      Musculoskeletal: Normal range of motion and neck supple. No neck rigidity.   Cardiovascular:      Rate and Rhythm: Normal rate and regular rhythm.      Pulses: Normal pulses.      Heart sounds: Normal heart sounds. No murmur.   Pulmonary:      Effort: Pulmonary effort is normal. No  respiratory distress.      Breath sounds: Normal breath sounds. No wheezing or rales.   Abdominal:      General: Bowel sounds are normal. There is no distension.      Palpations: Abdomen is soft. There is no mass.      Tenderness: There is no right CVA tenderness, left CVA tenderness, guarding or rebound.      Comments: Mild epigastric tenderness. Negative crooks's sign. No lower abdominal tenderness. No tenderness at McBurney's point.    Musculoskeletal: Normal range of motion.         General: No swelling or tenderness.   Skin:     General: Skin is warm and dry.      Capillary Refill: Capillary refill takes less than 2 seconds.      Findings: No rash.   Neurological:      General: No focal deficit present.      Mental Status: She is alert and oriented to person, place, and time.      GCS: GCS eye subscore is 4. GCS verbal subscore is 5. GCS motor subscore is 6.      Cranial Nerves: No cranial nerve deficit.      Sensory: No sensory deficit.      Motor: No weakness.   Psychiatric:         Mood and Affect: Mood normal.         ED Course       11:42 PM  The patient was seen and examined by Thao Combs MD in Room ED05.   Procedures             Results for orders placed or performed during the hospital encounter of 01/24/21   Abdomen US, limited (RUQ only)     Status: None    Narrative    EXAM: US ABDOMEN LIMITED  LOCATION: [facility]  DATE/TIME: 1/25/2021 12:11 AM    INDICATION: Right upper quadrant and epigastric pain.  COMPARISON: None.  TECHNIQUE: Limited abdominal ultrasound.    FINDINGS:    GALLBLADDER: Normal. No gallstones, wall thickening, or pericholecystic fluid. Negative sonographic Crooks's sign.    BILE DUCTS: No biliary dilatation. The common duct measures 5 mm.    LIVER: Normal parenchyma with smooth contour. No focal mass.    RIGHT KIDNEY: No hydronephrosis.    PANCREAS: The visualized portions are normal.    No ascites.      Impression    IMPRESSION:  1.  Normal right upper  quadrant. No gallstone or biliary dilatation.   Abdomen XR, 2 vw, flat and upright     Status: None    Narrative    EXAM: XR ABDOMEN 2 VW  LOCATION: [facility]  DATE/TIME: 1/25/2021 12:14 AM    INDICATION: Constipation and abdominal pain.  COMPARISON: None.      Impression    IMPRESSION: Negative abdomen. Bowel gas pattern is normal. Nothing for obstruction or free air. Modest amount stool within the right hemicolon. Left hemicolon is essentially empty. No evidence for renal stones.    CBC with platelets differential     Status: None   Result Value Ref Range    WBC 6.3 4.0 - 11.0 10e9/L    RBC Count 4.23 3.8 - 5.2 10e12/L    Hemoglobin 12.4 11.7 - 15.7 g/dL    Hematocrit 37.2 35.0 - 47.0 %    MCV 88 78 - 100 fl    MCH 29.3 26.5 - 33.0 pg    MCHC 33.3 31.5 - 36.5 g/dL    RDW 12.6 10.0 - 15.0 %    Platelet Count 236 150 - 450 10e9/L    Diff Method Automated Method     % Neutrophils 43.9 %    % Lymphocytes 46.7 %    % Monocytes 7.3 %    % Eosinophils 1.1 %    % Basophils 0.8 %    % Immature Granulocytes 0.2 %    Nucleated RBCs 0 0 /100    Absolute Neutrophil 2.8 1.6 - 8.3 10e9/L    Absolute Lymphocytes 2.9 0.8 - 5.3 10e9/L    Absolute Monocytes 0.5 0.0 - 1.3 10e9/L    Absolute Eosinophils 0.1 0.0 - 0.7 10e9/L    Absolute Basophils 0.1 0.0 - 0.2 10e9/L    Abs Immature Granulocytes 0.0 0 - 0.4 10e9/L    Absolute Nucleated RBC 0.0    Comprehensive metabolic panel     Status: Abnormal   Result Value Ref Range    Sodium 143 133 - 144 mmol/L    Potassium 3.5 3.4 - 5.3 mmol/L    Chloride 110 (H) 94 - 109 mmol/L    Carbon Dioxide 29 20 - 32 mmol/L    Anion Gap 4 3 - 14 mmol/L    Glucose 86 70 - 99 mg/dL    Urea Nitrogen 14 7 - 30 mg/dL    Creatinine 0.65 0.52 - 1.04 mg/dL    GFR Estimate >90 >60 mL/min/[1.73_m2]    GFR Estimate If Black >90 >60 mL/min/[1.73_m2]    Calcium 8.9 8.5 - 10.1 mg/dL    Bilirubin Total 0.2 0.2 - 1.3 mg/dL    Albumin 3.6 3.4 - 5.0 g/dL    Protein Total 6.7 (L) 6.8 - 8.8 g/dL    Alkaline  Phosphatase 75 40 - 150 U/L    ALT 15 0 - 50 U/L    AST 9 0 - 45 U/L   UA with Microscopic     Status: Abnormal   Result Value Ref Range    Color Urine Yellow     Appearance Urine Clear     Glucose Urine Negative NEG^Negative mg/dL    Bilirubin Urine Negative NEG^Negative    Ketones Urine Negative NEG^Negative mg/dL    Specific Gravity Urine 1.027 1.003 - 1.035    Blood Urine Negative NEG^Negative    pH Urine 6.0 5.0 - 7.0 pH    Protein Albumin Urine 10 (A) NEG^Negative mg/dL    Urobilinogen mg/dL Normal 0.0 - 2.0 mg/dL    Nitrite Urine Negative NEG^Negative    Leukocyte Esterase Urine Trace (A) NEG^Negative    Source Midstream Urine     WBC Urine 12 (H) 0 - 5 /HPF    RBC Urine 1 0 - 2 /HPF    Squamous Epithelial /HPF Urine 2 (H) 0 - 1 /HPF    Mucous Urine Present (A) NEG^Negative /LPF   HCG qualitative urine (UPT)     Status: None   Result Value Ref Range    HCG Qual Urine Negative NEG^Negative   Lipase     Status: None   Result Value Ref Range    Lipase 73 73 - 393 U/L     Medications   lidocaine (XYLOCAINE) 2 % 15 mL, alum & mag hydroxide-simethicone (MAALOX) 15 mL GI Cocktail (30 mLs Oral Given 1/25/21 0026)   pantoprazole (PROTONIX) IV push injection 40 mg (40 mg Intravenous Given 1/25/21 0027)   0.9% sodium chloride BOLUS (0 mLs Intravenous Stopped 1/25/21 0105)        Assessments & Plan (with Medical Decision Making)   Patient's chief complaint was initially reported by the triage nurse as pelvic pain however in further discussion she states that she is actually here to be evaluated for epigastric pain. She has already been seen by her PCP for the pelvic discomfort during intercourse and was diagnosed with bacterial vaginosis and reports pelvic exam performed at that time. STD testing at that time was negative per care everywhere results. She denies any current pelvic pain or vaginal discharge. She states she came for evaluation of epigastric pain worse after eating with history of GERD. Thus do not feel she  warrants any further work up of the prior pelvic complaint. Differential diagnosis includes but is not limited to gastritis, ulcer, pancreatitis, gallbladder pathology, constipation. She has mild epigastric discomfort on exam but no signs of peritonitis and no lower abdominal tenderness. Thus felt appendicitis or ovarian pathology would be very unlikely. She is overall well appearing and in no acute distress.     Laboratory studies were obtained and were largely unremarkable. Lipase within normal limits. CMP unremarkable with normal liver function and renal function. CBC with normal WBC of 6.3 and hemoglobin of 12.4. UA with question of UTI with 12 WBC and she is having some urinary discomfort. No CVA tenderness to suggest pyelonephritis. Will treat this with keflex course and urine culture was sent. Pregnancy test is negative.     With her report of some constipation will obtain 2 view abdominal xray however she is having bowel movements now so unlikely to have obstruction. Also obtained RUQ US to evaluate for gallbladder pathology. Xray is unremarkable. RUQ US also unremarkable without gallstones. Patient was given IV fluid, GI cocktail, and protonix and was feeling improved on re-evaluation. Do suspect a component of gastritis. She was prescribed protonix and carafate as well as the keflex course. She was advised to also take the previously prescribed medication for the BV. She was advised to follow up with her PCP if not improving. She was given indications for return. She voiced understanding and was comfortable with this plan. She was discharged in improved condition.     I have reviewed the nursing notes. I have reviewed the findings, diagnosis, plan and need for follow up with the patient.    Discharge Medication List as of 1/25/2021  1:05 AM      START taking these medications    Details   cephALEXin (KEFLEX) 500 MG capsule Take 1 capsule (500 mg) by mouth 2 times daily for 5 days, Disp-10 capsule, R-0,  Local Print      pantoprazole (PROTONIX) 40 MG EC tablet Take 1 tablet (40 mg) by mouth daily for 14 doses, Disp-14 tablet, R-0, Local Print      sucralfate (CARAFATE) 1 GM tablet Take 1 tablet (1 g) by mouth 4 times daily as needed (abdominal pain), Disp-28 tablet, R-0, Local Print             Final diagnoses:   Acute gastritis without hemorrhage, unspecified gastritis type   Acute cystitis without hematuria   Bacterial vaginosis     IMonique, am serving as a trained medical scribe to document services personally performed by Thao Combs MD, based on the provider's statements to me.      I, Thao Combs MD, was physically present and have reviewed and verified the accuracy of this note documented by Monique Jackson.      --  Thao Combs MD  [facility] EMERGENCY DEPARTMENT  1/24/2021     Thao Combs MD  02/10/21 0852

## 2021-01-25 NOTE — DISCHARGE INSTRUCTIONS
Please make an appointment to follow up with Your Primary Care Provider as soon as possible if not improving.    Take the protonix antacid medication as prescribed daily to prevent acid. Can take carafate for pain as well. It looks like you may also have a bladder infection so take the keflex antibiotic as prescribed. Also take the flagyl as prescribed for your BV.     Return to the ED if you develop worsening pain, fever, uncontrolled vomiting, blood in the stool or vomit, lack of bowel movements, inability to eat or drink, or any new or worsening concerns.

## 2021-01-26 LAB
BACTERIA SPEC CULT: ABNORMAL
Lab: ABNORMAL
SPECIMEN SOURCE: ABNORMAL

## 2021-01-27 ENCOUNTER — TELEPHONE (OUTPATIENT)
Dept: EMERGENCY MEDICINE | Facility: CLINIC | Age: 26
End: 2021-01-27

## 2021-01-27 NOTE — TELEPHONE ENCOUNTER
[facility] Emergency Department/Urgent Care Lab result notification [Positive for uti and bacteria is susceptible to antibiotic]:    Reason for call:   Notify of Final urine culture result, confirm patient is taking antibiotic, assess symptoms, and advise per Emergency Dept/Urgent Care discharge instructions and Emergency Dept urine culture protocol.    Lab Result & Date of Final Report [copied from Result Note]:    Final Urine Culture Report on 1/26/21  Emergency Dept/Urgent Care discharge antibiotic prescribed: Cephalexin (Keflex) 500 mg capsule, 1 capsule (500 mg) by mouth 2 times daily for 5 days.  #1. Bacteria, 50,000 - 100,000 colonies/mL Proteus mirabilis, is SUSCEPTIBLE to Antibiotic.    As per [place] ED Lab Result protocol, no change in antibiotic therapy.    Current symptoms (include time patient called):    5:09PM: Spoke with patient. She states she is improving. Has questions about what she should be eating to boost her immune system.     Recommendations/Instructions:   Patient notified of lab result and treatment recommendation.   Take antibiotic as directed by the Emergency Dept/Urgent Care Provider.  Advised to follow up with the PCP as directed by the ED provider and to discuss her dietary concerns with her provider.   The patient is comfortable with the information given and has no further questions.     UTI prevention/education reviewed with patient [Yes/No]:  Yes    Please contact you PCP or return to the Emergency Department if your:    Symptoms worsen or other concerning symptoms    Oxana Rojas RN  [facility] RN  Lung Nodule and ED Lab Result RN  Epic pool (ED late result f/u RN): [id number]  FV INCIDENTAL RADIOLOGY F/U NURSES: P [id number]  # [phone]

## 2021-03-09 ENCOUNTER — HOSPITAL ENCOUNTER (EMERGENCY)
Facility: CLINIC | Age: 26
Discharge: HOME | End: 2021-03-09
Attending: EMERGENCY MEDICINE | Admitting: EMERGENCY MEDICINE
Payer: COMMERCIAL

## 2021-03-09 VITALS
TEMPERATURE: 98.8 F | WEIGHT: 230 LBS | BODY MASS INDEX: 33.97 KG/M2 | SYSTOLIC BLOOD PRESSURE: 130 MMHG | DIASTOLIC BLOOD PRESSURE: 65 MMHG | OXYGEN SATURATION: 100 % | HEART RATE: 72 BPM | RESPIRATION RATE: 16 BRPM

## 2021-03-09 DIAGNOSIS — M22.2X1 PATELLOFEMORAL SYNDROME OF RIGHT KNEE: ICD-10-CM

## 2021-03-09 DIAGNOSIS — L24.9 IRRITANT CONTACT DERMATITIS, UNSPECIFIED TRIGGER: ICD-10-CM

## 2021-03-09 LAB — HCG UR QL: NEGATIVE

## 2021-03-09 PROCEDURE — 87491 CHLMYD TRACH DNA AMP PROBE: CPT | Performed by: EMERGENCY MEDICINE

## 2021-03-09 PROCEDURE — 99284 EMERGENCY DEPT VISIT MOD MDM: CPT | Performed by: EMERGENCY MEDICINE

## 2021-03-09 PROCEDURE — 87591 N.GONORRHOEAE DNA AMP PROB: CPT | Performed by: EMERGENCY MEDICINE

## 2021-03-09 PROCEDURE — 99283 EMERGENCY DEPT VISIT LOW MDM: CPT

## 2021-03-09 PROCEDURE — 81025 URINE PREGNANCY TEST: CPT | Performed by: EMERGENCY MEDICINE

## 2021-03-09 RX ORDER — BENZOCAINE/MENTHOL 6 MG-10 MG
LOZENGE MUCOUS MEMBRANE 2 TIMES DAILY
Qty: 30 G | Refills: 0 | Status: SHIPPED | OUTPATIENT
Start: 2021-03-09

## 2021-03-09 ASSESSMENT — ENCOUNTER SYMPTOMS
JOINT SWELLING: 1
FEVER: 0
ABDOMINAL PAIN: 0
SHORTNESS OF BREATH: 0
BACK PAIN: 0
COLOR CHANGE: 0

## 2021-03-09 NOTE — ED TRIAGE NOTES
"Right knee pain that radiates down her leg. Felt a right knee \"bump\" 3 days ago. Pain started getting worse. Denies trauma to the leg.   "

## 2021-03-09 NOTE — DISCHARGE INSTRUCTIONS
Thank you for coming to the [facility] Emergency Department.     For your knee:  Apply ice to the sore knee for 20 minutes at the end of every day, and as needed earlier in the day after activity. Start taking ibuprofen 400mg every 6 hours during the day for the next 1-2 weeks.   Rest the knee as much as possible (decrease running/long walks while healing)  We have referred you to see a Sports Medicine/Orthopedic provider for re-evaluation in the next 10-14 days if you are not getting better with the above treatments.     Gonorrhea and chlamydia testing is in process. Expect a call from the hospital in 1-2 days if tests show infection and treatment is needed.     Return to the ER if the knee becomes red, hot, much more swollen and painful, or your lower leg becomes swollen.     For your neck rash:  Do not wear tight shirts or metal necklaces for the next 2 weeks. Apply the hydrocortisone cream twice daily to the rash area.

## 2021-03-09 NOTE — ED PROVIDER NOTES
"ED Provider Note  [facility]      History     Chief Complaint   Patient presents with     Knee Pain     Right knee pain that radiates down her leg. Felt a right knee \"bump\" 3 days ago. Pain started getting worse. Denies trauma to the leg.      The history is provided by the patient and medical records.   Knee Pain  Associated symptoms: no back pain and no fever      Vandana Lagos is a 25 year old female with a past medical history significant for asthma and headaches who presents to the ED for evaluation of abdominal pain.  Over the last 2 days, the patient reports feeling a bump on her right knee with associated knee pain that radiates down her leg.  She states that the pain has been getting worse but denies any trauma to the leg.  She states that she has had this pain for about a year but it has been worse within the last 2 days.  The patient's friend stated that it may be more swollen but she is unsure about this.  Patient tried Tylenol, compression and pressure which was able to help a little but she has stopped this.  She states that it may be warm to the touch but only mildly.  She denies any other symptoms.  She denies any heavy drinking.  The patient's mother does have a history of DVTs.    Past Medical History  Past Medical History:   Diagnosis Date     Preeclampsia 2017     Uncomplicated asthma      No past surgical history on file.  acetaminophen (TYLENOL) 325 MG tablet  hydrocortisone (CORTAID) 1 % external cream      No Known Allergies  Family History  No family history on file.  Social History   Social History     Tobacco Use     Smoking status: Former Smoker     Quit date: 2017     Years since quittin.1     Smokeless tobacco: Never Used   Substance Use Topics     Alcohol use: No     Drug use: No      Past medical history, past surgical history, medications, allergies, family history, and social history were reviewed with the patient. No additional " pertinent items.       Review of Systems   Constitutional: Negative for fever.   Respiratory: Negative for shortness of breath.    Cardiovascular: Negative for chest pain.   Gastrointestinal: Negative for abdominal pain.   Musculoskeletal: Positive for joint swelling (Mild). Negative for back pain.        Positive for right knee pain.   Skin: Negative for color change.   All other systems reviewed and are negative.    A complete review of systems was performed with pertinent positives and negatives noted in the HPI, and all other systems negative.    Physical Exam   BP: 130/65  Pulse: 72  Temp: 98.8  F (37.1  C)  Resp: 16  Weight: 104.3 kg (230 lb)  SpO2: 100 %    Physical Exam  Gen:A&Ox3, no acute distress  HEENT:PERRL, no facial tenderness or wounds, head atraumatic  CV:RRR without murmurs  PULM:Clear to auscultation bilaterally  Abd:soft, nontender, nondistended. Bowel sounds present and normal  UE:No traumatic injuries, skin normal  LE: + DP and PT pulses with normal perfusion. No calf tenderness or swelling. Right ankle with mild pain with palpation of fibulotalar ligament. Right knee with tenderness over patella tendon that is most significant at the insertion and with mild effusion of the infrapatellar bursa. ROM of the knee intact. No joint line bony tenderness. Mild discomfort with stress of the lateral collateral ligament. Normal drawer testing.  Neuro:CN II-XII intact, strength 5/5 throughout, sensation intact  Skin: papular rash at the base of the neck  ED Course       5:20 PM  The patient was seen and examined by Carly Valadez MD in Room ED19.   Procedures                         Results for orders placed or performed during the hospital encounter of 03/09/21   HCG qualitative urine     Status: None   Result Value Ref Range    HCG Qual Urine Negative NEG^Negative     Medications - No data to display     Assessments & Plan (with Medical Decision Making)   25-year-old female presenting with right knee  pain.  Reports acute on chronic knee pain, worse over the last few days without any focal trauma.  Area of maximal tenderness appears to be over the patellofemoral ligament as well as the lateral collateral ligament.  She has a small effusion, potentially consistent with patellofemoral syndrome versus bursitis.  Recommended treatment with supportive care including decreased use, ice, elevation and NSAIDs.    I have referred her to sports medicine as she may benefit from PT and may benefit from additional imaging.    Given the lack of acute trauma today I did not perform any imaging at this time.    We also assessed for the possibility of DVT and do not see any significant calf pain or swelling, or foot swelling to suggest this diagnosis.  So, we chose not to do any studies at this time.    Differential also included gonococcal arthritis and gonorrhea/chlamydia PCR urine testing is in process.  She is currently without any signs of cervicitis or PID and we did not treat empirically; though, she does have a partner for the last year with episodes of unprotected sex.  Pregnancy test negative.    Patient also notices a rash around her neck which has been present for the last several days, likely a contact dermatitis.  We will treat with a course of topical steroids.  Follow-up with PCP.    This part of the medical record was transcribed by Vasquez Wood, Medical Scribe, from a dictation done by Carly Valadez MD.       I have reviewed the nursing notes. I have reviewed the findings, diagnosis, plan and need for follow up with the patient.    Discharge Medication List as of 3/9/2021  6:01 PM      START taking these medications    Details   hydrocortisone (CORTAID) 1 % external cream Apply topically 2 times dailyDisp-30 g, R-0Local Print             Final diagnoses:   Irritant contact dermatitis, unspecified trigger   Patellofemoral syndrome of right knee       --  I, Yovanny Blair, am serving as a trained medical scribe  to document services personally performed by Carly Valadez MD, based on the provider's statements to me.     I, Carly Valadez MD, was physically present and have reviewed and verified the accuracy of this note documented by Yovanny Blair.    [facility] Valadez MD [facility]  3/9/2021     Carly Valadez MD  03/10/21 0246

## 2021-03-10 ENCOUNTER — TELEPHONE (OUTPATIENT)
Dept: ORTHOPEDICS | Facility: CLINIC | Age: 26
End: 2021-03-10

## 2021-03-10 NOTE — RESULT ENCOUNTER NOTE
Final result for both N. Gonorrhoeae PCR and Chlamydia Trachomatis PCR are NEGATIVE.  No treatment or change in treatment per [place] ED Lab Result protocol.

## 2021-03-12 ENCOUNTER — TELEPHONE (OUTPATIENT)
Dept: EMERGENCY MEDICINE | Facility: CLINIC | Age: 26
End: 2021-03-12

## 2021-03-12 NOTE — TELEPHONE ENCOUNTER
"[facility] Emergency Department Lab result notification     Patient/parent   Vandana    Reason for call  Patient requesting lab result    Lab Result  Component      Latest Ref Rng & Units 3/9/2021   Specimen Descrip       Midstream Urine   N Gonorrhea PCR      NEG:Negative Negative   Specimen Description       Midstream Urine   Chlamydia Trachomatis PCR      NEG:Negative Negative     Current symptoms  \"The pain in my leg is getting worse.  What should I do?    Recommendations/Instructions  RN reviewed ED AVS discharge instructions.  She was encouraged to return to the Emergency Dept for worsening sx's.  She can also try to conrtact PCP or Orthopedist today but she will like need to return to the ED    Contact your PCP clinic or return to the Emergency department if your:    Symptoms worsen or other concerning symptom's.    PCP follow-up Questions asked: NO    [RN Name]  Paul Jaquez RN  [facility]  Emergency Dept Lab Result RN  # [phone]      "

## 2021-03-13 ENCOUNTER — APPOINTMENT (OUTPATIENT)
Dept: ULTRASOUND IMAGING | Facility: CLINIC | Age: 26
End: 2021-03-13
Attending: FAMILY MEDICINE
Payer: COMMERCIAL

## 2021-03-13 ENCOUNTER — HOSPITAL ENCOUNTER (EMERGENCY)
Facility: CLINIC | Age: 26
Discharge: HOME | End: 2021-03-13
Attending: FAMILY MEDICINE | Admitting: FAMILY MEDICINE
Payer: COMMERCIAL

## 2021-03-13 ENCOUNTER — APPOINTMENT (OUTPATIENT)
Dept: GENERAL RADIOLOGY | Facility: CLINIC | Age: 26
End: 2021-03-13
Attending: FAMILY MEDICINE
Payer: COMMERCIAL

## 2021-03-13 VITALS
TEMPERATURE: 97.9 F | DIASTOLIC BLOOD PRESSURE: 57 MMHG | HEART RATE: 85 BPM | OXYGEN SATURATION: 100 % | SYSTOLIC BLOOD PRESSURE: 130 MMHG

## 2021-03-13 DIAGNOSIS — M22.2X1 PATELLOFEMORAL DISORDER OF RIGHT KNEE: ICD-10-CM

## 2021-03-13 PROCEDURE — 93971 EXTREMITY STUDY: CPT | Mod: RT

## 2021-03-13 PROCEDURE — 99284 EMERGENCY DEPT VISIT MOD MDM: CPT | Mod: 25 | Performed by: FAMILY MEDICINE

## 2021-03-13 PROCEDURE — 99284 EMERGENCY DEPT VISIT MOD MDM: CPT | Performed by: FAMILY MEDICINE

## 2021-03-13 PROCEDURE — 73562 X-RAY EXAM OF KNEE 3: CPT | Mod: RT

## 2021-03-13 RX ORDER — IBUPROFEN 800 MG/1
800 TABLET, FILM COATED ORAL EVERY 8 HOURS PRN
Qty: 15 TABLET | Refills: 0 | Status: SHIPPED | OUTPATIENT
Start: 2021-03-13 | End: 2021-03-18

## 2021-03-13 NOTE — ED PROVIDER NOTES
"    [facility] EMERGENCY DEPARTMENT ([facility])     2021  History     Chief Complaint   Patient presents with     Knee Pain     right-sided knee pain, seen here 3/10, slipped in shower, \"feels like theres water running through my leg\"      HPI  Vandana Lagos is a 25 year old female with a PMH of homelessness, uncomplicated asthma, chlamydia, and preeclampsia who presents to the ED today complaining of right leg swelling.    Patient was recently seen here in the ED on 3/9/2021 complaining of right knee pain.  Patient denied any trauma to the leg.  She stated she has had the pain for about a year but has worsened within the past 2 days.  Patient tried Tylenol, compression and pressure which was able to help temporarily.  She stated that it may have been warm to the touch.  Of note, the patient's mother does have a history of DVTs.  On examination, no significant swelling was seen, and since the patient denied trauma, it was decided that imaging was not necessary.  Patient also had noticed a rash around her neck which had been present for the past several days, likely contact dermatitis.  Patient was treated with a course of topical steroids.    I have reviewed the Medications, Allergies, Past Medical and Surgical History, and Social History in the Epic system.  PAST MEDICAL HISTORY:   Past Medical History:   Diagnosis Date     Preeclampsia 2017     Uncomplicated asthma        PAST SURGICAL HISTORY: No past surgical history on file.    Past medical history, past surgical history, medications, and allergies were reviewed with the patient. Additional pertinent items: None    FAMILY HISTORY: No family history on file.    SOCIAL HISTORY:   Social History     Tobacco Use     Smoking status: Former Smoker     Quit date: 2017     Years since quittin.1     Smokeless tobacco: Never Used   Substance Use Topics     Alcohol use: No     Social history was reviewed with the patient. Additional " pertinent items: None      Discharge Medication List as of 3/13/2021  7:14 PM      START taking these medications    Details   ibuprofen (ADVIL/MOTRIN) 800 MG tablet Take 1 tablet (800 mg) by mouth every 8 hours as needed for moderate pain, Disp-15 tablet, R-0, Local Print         CONTINUE these medications which have NOT CHANGED    Details   acetaminophen (TYLENOL) 325 MG tablet Take 2 tablets (650 mg) by mouth every 6 hours as needed for mild pain or fever (greater than or equal to 38 C /100.4F (oral)), Disp-100 tablet, R-0, E-Prescribe      hydrocortisone (CORTAID) 1 % external cream Apply topically 2 times dailyDisp-30 g, R-0Local Print              No Known Allergies     Review of Systems  A complete review of systems was performed with pertinent positives and negatives noted in the HPI, and all other systems negative.    Physical Exam   BP: 130/57  Pulse: 85  Temp: 97.9  F (36.6  C)  SpO2: 100 %      Physical Exam  Constitutional:       General: She is not in acute distress.     Appearance: She is not diaphoretic.   HENT:      Head: Atraumatic.      Mouth/Throat:      Pharynx: No oropharyngeal exudate.   Eyes:      General: No scleral icterus.     Pupils: Pupils are equal, round, and reactive to light.   Cardiovascular:      Heart sounds: Normal heart sounds.   Pulmonary:      Effort: No respiratory distress.      Breath sounds: Normal breath sounds.   Abdominal:      General: Bowel sounds are normal.      Palpations: Abdomen is soft.      Tenderness: There is no abdominal tenderness.   Musculoskeletal:         General: No tenderness.   Skin:     General: Skin is warm.      Findings: No rash.   Neurological:      General: No focal deficit present.      Mental Status: She is oriented to person, place, and time.      Motor: No weakness.      Coordination: Coordination normal.         ED Course        Procedures           Results for orders placed or performed during the hospital encounter of 03/13/21   [facility]  Extremity Venous Duplex Right     Status: None    Narrative    ULTRASOUND RIGHT LOWER EXTREMITY VENOUS DUPLEX  3/13/2021 6:28 PM    CLINICAL HISTORY:  Pain and swelling of right leg.    TECHNIQUE: Venous Duplex ultrasound of the right lower extremity with  and without compression, augmentation and duplex. Color flow and  spectral Doppler with waveform analysis performed.    COMPARISON: None.    FINDINGS: Exam includes the common femoral, femoral, popliteal, and  contralateral common femoral veins as well as segmentally visualized  deep calf veins and greater saphenous vein.     RIGHT: No deep vein thrombosis. No superficial thrombophlebitis. No  popliteal cyst.      Impression    IMPRESSION:  No deep venous thrombosis in the right lower extremity.    TRISHA JONES MD   XR Knee Right 3 Views     Status: None    Narrative    RIGHT KNEE THREE VIEWS   3/13/2021 6:42 PM     HISTORY:  Right knee and leg pain.    COMPARISON: None.    FINDINGS: There is no fracture or significant joint effusion. Joint  spaces are well maintained.       Impression    IMPRESSION:  Negative right knee x-rays.      TRISHA JONES MD                       Assessments & Plan (with Medical Decision Making)         I have reviewed the nursing notes.    I have reviewed the findings, diagnosis, plan and need for follow up with the patient.    Discharge Medication List as of 3/13/2021  7:14 PM      START taking these medications    Details   ibuprofen (ADVIL/MOTRIN) 800 MG tablet Take 1 tablet (800 mg) by mouth every 8 hours as needed for moderate pain, Disp-15 tablet, R-0, Local Print         Patient with likely patellofemoral disorder the right knee as previously diagnosed however with increase of symptoms she had evaluation including ultrasound and x-ray which were negative.  She will continue on anti-inflammatories and follow-up with primary if not improving.    Final diagnoses:   Patellofemoral disorder of right knee       3/13/2021   [facility]  [facility] EMERGENCY DEPARTMENT     Steven Farris MD  03/14/21 0048

## 2021-03-14 NOTE — DISCHARGE INSTRUCTIONS
Discharge to home using anti-inflammatories following up with your primary clinic for physical therapy referral.

## 2021-03-14 NOTE — ED NOTES
"Entered room for discharge, pt was tearful.  PT states we have done nothing for her regarding her pain and advil isn't cutting it.  Tried to offer pt advocate for increased pain meds, but pt refused.  PT states \"I am going to go somewhere else and have a second opinion\".  Pt appeared to be more calm upon discharge but definitely frustrated and agitated regarding situation.   "

## 2021-03-25 ENCOUNTER — TELEPHONE (OUTPATIENT)
Dept: ORTHOPEDICS | Facility: CLINIC | Age: 26
End: 2021-03-25

## 2021-06-11 DIAGNOSIS — N91.2 AMENORRHEA: Primary | ICD-10-CM

## 2021-06-18 ENCOUNTER — HOSPITAL ENCOUNTER (OUTPATIENT)
Dept: ULTRASOUND IMAGING | Facility: CLINIC | Age: 26
Discharge: HOME | End: 2021-06-18
Attending: MIDWIFE | Admitting: MIDWIFE
Payer: COMMERCIAL

## 2021-06-18 DIAGNOSIS — N91.2 AMENORRHEA: ICD-10-CM

## 2021-06-18 PROCEDURE — 76817 TRANSVAGINAL US OBSTETRIC: CPT | Mod: 26 | Performed by: RADIOLOGY

## 2021-06-18 PROCEDURE — 76801 OB US < 14 WKS SINGLE FETUS: CPT

## 2021-06-18 PROCEDURE — 76801 OB US < 14 WKS SINGLE FETUS: CPT | Mod: 26 | Performed by: RADIOLOGY

## 2021-06-25 ENCOUNTER — HOSPITAL ENCOUNTER (EMERGENCY)
Facility: CLINIC | Age: 26
Discharge: HOME | End: 2021-06-25
Attending: STUDENT IN AN ORGANIZED HEALTH CARE EDUCATION/TRAINING PROGRAM | Admitting: STUDENT IN AN ORGANIZED HEALTH CARE EDUCATION/TRAINING PROGRAM
Payer: COMMERCIAL

## 2021-06-25 VITALS
HEART RATE: 74 BPM | RESPIRATION RATE: 16 BRPM | TEMPERATURE: 98.7 F | DIASTOLIC BLOOD PRESSURE: 63 MMHG | SYSTOLIC BLOOD PRESSURE: 104 MMHG | OXYGEN SATURATION: 100 %

## 2021-06-25 DIAGNOSIS — O21.9 NAUSEA AND VOMITING DURING PREGNANCY: ICD-10-CM

## 2021-06-25 LAB
ABO + RH BLD: NORMAL
ABO + RH BLD: NORMAL
ALBUMIN SERPL-MCNC: 3.6 G/DL (ref 3.4–5)
ALBUMIN UR-MCNC: NEGATIVE MG/DL
ALP SERPL-CCNC: 74 U/L (ref 40–150)
ALT SERPL W P-5'-P-CCNC: 15 U/L (ref 0–50)
ANION GAP SERPL CALCULATED.3IONS-SCNC: 8 MMOL/L (ref 3–14)
APPEARANCE UR: CLEAR
AST SERPL W P-5'-P-CCNC: 14 U/L (ref 0–45)
B-HCG SERPL-ACNC: ABNORMAL IU/L (ref 0–5)
BACTERIA #/AREA URNS HPF: ABNORMAL /HPF
BASOPHILS # BLD AUTO: 0 10E9/L (ref 0–0.2)
BASOPHILS NFR BLD AUTO: 0.4 %
BILIRUB SERPL-MCNC: 0.3 MG/DL (ref 0.2–1.3)
BILIRUB UR QL STRIP: NEGATIVE
BLD GP AB SCN SERPL QL: NORMAL
BLOOD BANK CMNT PATIENT-IMP: NORMAL
BUN SERPL-MCNC: 10 MG/DL (ref 7–30)
CALCIUM SERPL-MCNC: 8.9 MG/DL (ref 8.5–10.1)
CHLORIDE SERPL-SCNC: 105 MMOL/L (ref 94–109)
CO2 SERPL-SCNC: 23 MMOL/L (ref 20–32)
COLOR UR AUTO: ABNORMAL
CREAT SERPL-MCNC: 0.66 MG/DL (ref 0.52–1.04)
DIFFERENTIAL METHOD BLD: NORMAL
EOSINOPHIL # BLD AUTO: 0 10E9/L (ref 0–0.7)
EOSINOPHIL NFR BLD AUTO: 0.4 %
ERYTHROCYTE [DISTWIDTH] IN BLOOD BY AUTOMATED COUNT: 12.9 % (ref 10–15)
GFR SERPL CREATININE-BSD FRML MDRD: >90 ML/MIN/{1.73_M2}
GLUCOSE SERPL-MCNC: 81 MG/DL (ref 70–99)
GLUCOSE UR STRIP-MCNC: NEGATIVE MG/DL
HCT VFR BLD AUTO: 38.5 % (ref 35–47)
HGB BLD-MCNC: 12.7 G/DL (ref 11.7–15.7)
HGB UR QL STRIP: NEGATIVE
IMM GRANULOCYTES # BLD: 0 10E9/L (ref 0–0.4)
IMM GRANULOCYTES NFR BLD: 0.4 %
KETONES UR STRIP-MCNC: NEGATIVE MG/DL
LABORATORY COMMENT REPORT: NORMAL
LACTATE BLD-SCNC: 1.3 MMOL/L (ref 0.7–2)
LEUKOCYTE ESTERASE UR QL STRIP: NEGATIVE
LYMPHOCYTES # BLD AUTO: 1.3 10E9/L (ref 0.8–5.3)
LYMPHOCYTES NFR BLD AUTO: 18.5 %
MCH RBC QN AUTO: 27.7 PG (ref 26.5–33)
MCHC RBC AUTO-ENTMCNC: 33 G/DL (ref 31.5–36.5)
MCV RBC AUTO: 84 FL (ref 78–100)
MONOCYTES # BLD AUTO: 0.8 10E9/L (ref 0–1.3)
MONOCYTES NFR BLD AUTO: 10.8 %
MUCOUS THREADS #/AREA URNS LPF: PRESENT /LPF
NEUTROPHILS # BLD AUTO: 5 10E9/L (ref 1.6–8.3)
NEUTROPHILS NFR BLD AUTO: 69.5 %
NITRATE UR QL: NEGATIVE
NRBC # BLD AUTO: 0 10*3/UL
NRBC BLD AUTO-RTO: 0 /100
PH UR STRIP: 6.5 PH (ref 5–7)
PLATELET # BLD AUTO: 247 10E9/L (ref 150–450)
POTASSIUM SERPL-SCNC: 3.4 MMOL/L (ref 3.4–5.3)
PROT SERPL-MCNC: 7.4 G/DL (ref 6.8–8.8)
RBC # BLD AUTO: 4.59 10E12/L (ref 3.8–5.2)
RBC #/AREA URNS AUTO: <1 /HPF (ref 0–2)
SARS-COV-2 RNA RESP QL NAA+PROBE: NEGATIVE
SODIUM SERPL-SCNC: 136 MMOL/L (ref 133–144)
SOURCE: ABNORMAL
SP GR UR STRIP: 1.02 (ref 1–1.03)
SPECIMEN EXP DATE BLD: NORMAL
SPECIMEN SOURCE: NORMAL
SQUAMOUS #/AREA URNS AUTO: 1 /HPF (ref 0–1)
UROBILINOGEN UR STRIP-MCNC: NORMAL MG/DL (ref 0–2)
WBC # BLD AUTO: 7.2 10E9/L (ref 4–11)
WBC #/AREA URNS AUTO: 1 /HPF (ref 0–5)

## 2021-06-25 PROCEDURE — 86850 RBC ANTIBODY SCREEN: CPT | Performed by: STUDENT IN AN ORGANIZED HEALTH CARE EDUCATION/TRAINING PROGRAM

## 2021-06-25 PROCEDURE — C9803 HOPD COVID-19 SPEC COLLECT: HCPCS | Performed by: STUDENT IN AN ORGANIZED HEALTH CARE EDUCATION/TRAINING PROGRAM

## 2021-06-25 PROCEDURE — 80053 COMPREHEN METABOLIC PANEL: CPT | Performed by: STUDENT IN AN ORGANIZED HEALTH CARE EDUCATION/TRAINING PROGRAM

## 2021-06-25 PROCEDURE — 81001 URINALYSIS AUTO W/SCOPE: CPT | Performed by: STUDENT IN AN ORGANIZED HEALTH CARE EDUCATION/TRAINING PROGRAM

## 2021-06-25 PROCEDURE — 87635 SARS-COV-2 COVID-19 AMP PRB: CPT | Performed by: STUDENT IN AN ORGANIZED HEALTH CARE EDUCATION/TRAINING PROGRAM

## 2021-06-25 PROCEDURE — 36415 COLL VENOUS BLD VENIPUNCTURE: CPT | Performed by: STUDENT IN AN ORGANIZED HEALTH CARE EDUCATION/TRAINING PROGRAM

## 2021-06-25 PROCEDURE — 258N000003 HC RX IP 258 OP 636

## 2021-06-25 PROCEDURE — 84702 CHORIONIC GONADOTROPIN TEST: CPT | Performed by: STUDENT IN AN ORGANIZED HEALTH CARE EDUCATION/TRAINING PROGRAM

## 2021-06-25 PROCEDURE — 96361 HYDRATE IV INFUSION ADD-ON: CPT | Performed by: STUDENT IN AN ORGANIZED HEALTH CARE EDUCATION/TRAINING PROGRAM

## 2021-06-25 PROCEDURE — 86901 BLOOD TYPING SEROLOGIC RH(D): CPT | Performed by: STUDENT IN AN ORGANIZED HEALTH CARE EDUCATION/TRAINING PROGRAM

## 2021-06-25 PROCEDURE — 99284 EMERGENCY DEPT VISIT MOD MDM: CPT | Mod: 25 | Performed by: STUDENT IN AN ORGANIZED HEALTH CARE EDUCATION/TRAINING PROGRAM

## 2021-06-25 PROCEDURE — 85025 COMPLETE CBC W/AUTO DIFF WBC: CPT | Performed by: STUDENT IN AN ORGANIZED HEALTH CARE EDUCATION/TRAINING PROGRAM

## 2021-06-25 PROCEDURE — 99284 EMERGENCY DEPT VISIT MOD MDM: CPT | Performed by: STUDENT IN AN ORGANIZED HEALTH CARE EDUCATION/TRAINING PROGRAM

## 2021-06-25 PROCEDURE — 83605 ASSAY OF LACTIC ACID: CPT | Performed by: STUDENT IN AN ORGANIZED HEALTH CARE EDUCATION/TRAINING PROGRAM

## 2021-06-25 PROCEDURE — 86900 BLOOD TYPING SEROLOGIC ABO: CPT | Performed by: STUDENT IN AN ORGANIZED HEALTH CARE EDUCATION/TRAINING PROGRAM

## 2021-06-25 PROCEDURE — 96374 THER/PROPH/DIAG INJ IV PUSH: CPT | Performed by: STUDENT IN AN ORGANIZED HEALTH CARE EDUCATION/TRAINING PROGRAM

## 2021-06-25 PROCEDURE — 250N000011 HC RX IP 250 OP 636: Performed by: STUDENT IN AN ORGANIZED HEALTH CARE EDUCATION/TRAINING PROGRAM

## 2021-06-25 RX ORDER — ONDANSETRON 4 MG/1
4 TABLET, ORALLY DISINTEGRATING ORAL EVERY 6 HOURS PRN
Qty: 10 TABLET | Refills: 0 | Status: SHIPPED | OUTPATIENT
Start: 2021-06-25 | End: 2021-07-02

## 2021-06-25 RX ORDER — SODIUM CHLORIDE 9 MG/ML
INJECTION, SOLUTION INTRAVENOUS CONTINUOUS
Status: DISCONTINUED | OUTPATIENT
Start: 2021-06-25 | End: 2021-06-26 | Stop reason: HOSPADM

## 2021-06-25 RX ORDER — SODIUM CHLORIDE 9 MG/ML
INJECTION, SOLUTION INTRAVENOUS
Status: COMPLETED
Start: 2021-06-25 | End: 2021-06-25

## 2021-06-25 RX ORDER — ONDANSETRON 2 MG/ML
4 INJECTION INTRAMUSCULAR; INTRAVENOUS EVERY 30 MIN PRN
Status: DISCONTINUED | OUTPATIENT
Start: 2021-06-25 | End: 2021-06-26 | Stop reason: HOSPADM

## 2021-06-25 RX ADMIN — ONDANSETRON 4 MG: 2 INJECTION INTRAMUSCULAR; INTRAVENOUS at 18:45

## 2021-06-25 RX ADMIN — SODIUM CHLORIDE 1000 ML: 9 INJECTION, SOLUTION INTRAVENOUS at 18:45

## 2021-06-25 RX ADMIN — Medication 1000 ML: at 18:45

## 2021-06-25 ASSESSMENT — ENCOUNTER SYMPTOMS
CONSTIPATION: 0
CHILLS: 0
FACIAL SWELLING: 0
FEVER: 0
HEADACHES: 0
NECK PAIN: 0
EYE REDNESS: 0
WOUND: 0
WHEEZING: 0
DYSURIA: 0
NUMBNESS: 0
FLANK PAIN: 0
DIARRHEA: 0
HEMATURIA: 0
BACK PAIN: 0
ABDOMINAL PAIN: 0
SHORTNESS OF BREATH: 0
NAUSEA: 1
EYE DISCHARGE: 0
RHINORRHEA: 0
WEAKNESS: 0
VOMITING: 1
EYE PAIN: 0
DIZZINESS: 0
SORE THROAT: 0

## 2021-06-25 NOTE — ED PROVIDER NOTES
ED Provider Note  [facility]      History     Chief Complaint   Patient presents with     Nausea & Vomiting     HPI  Vandana Lagos is a (7 wks pregnant) 25 year old female with a PMH of preeclampsia, acute gastritis, BV, candidiasis and uncomplicated asthma who presents to the ED today complaining of nausea and vomiting.  Patient states she is currently on her third pregnancy, and that her prior to be delivered by .  She denies any vaginal bleeding or discharge.  She states she takes Advil occasionally.  She denies being on any prenatal vitamins.  She states an ultrasound done last Friday which showed normal fetal activity.    Past Medical History  Past Medical History:   Diagnosis Date     Preeclampsia 2017     Uncomplicated asthma      No past surgical history on file.  ondansetron (ZOFRAN ODT) 4 MG ODT tab  acetaminophen (TYLENOL) 325 MG tablet  hydrocortisone (CORTAID) 1 % external cream      No Known Allergies  Family History  No family history on file.  Social History   Social History     Tobacco Use     Smoking status: Former Smoker     Quit date: 2017     Years since quittin.4     Smokeless tobacco: Never Used   Substance Use Topics     Alcohol use: No     Drug use: No      Past medical history, past surgical history, medications, allergies, family history, and social history were reviewed with the patient. No additional pertinent items.       Review of Systems   Constitutional: Negative for chills and fever.   HENT: Negative for ear pain, facial swelling, rhinorrhea and sore throat.    Eyes: Negative for pain, discharge and redness.   Respiratory: Negative for shortness of breath and wheezing.    Cardiovascular: Negative for chest pain.   Gastrointestinal: Positive for nausea and vomiting. Negative for abdominal pain, constipation and diarrhea.   Genitourinary: Negative for dysuria, flank pain, hematuria, vaginal bleeding and vaginal discharge.    Musculoskeletal: Negative for back pain and neck pain.   Skin: Negative for rash and wound.   Neurological: Negative for dizziness, weakness, numbness and headaches.   All other systems reviewed and are negative.      Physical Exam   BP: (!) 142/79  Pulse: 90  Temp: 99.4  F (37.4  C)  Resp: 16  SpO2: 100 %  Physical Exam  Constitutional:       General: She is not in acute distress.     Appearance: Normal appearance. She is not diaphoretic.   HENT:      Head: Normocephalic and atraumatic.      Nose: Nose normal.      Mouth/Throat:      Mouth: Mucous membranes are moist.      Pharynx: Oropharynx is clear. No oropharyngeal exudate.   Eyes:      General: Lids are normal. No scleral icterus.     Extraocular Movements: Extraocular movements intact.      Conjunctiva/sclera: Conjunctivae normal.      Pupils: Pupils are equal, round, and reactive to light.   Neck:      Musculoskeletal: Full passive range of motion without pain, normal range of motion and neck supple.   Cardiovascular:      Rate and Rhythm: Normal rate and regular rhythm.      Pulses: Normal pulses.      Heart sounds: Normal heart sounds. No murmur. No friction rub. No gallop.    Pulmonary:      Effort: Pulmonary effort is normal. No respiratory distress.      Breath sounds: Normal breath sounds. No stridor. No wheezing, rhonchi or rales.   Abdominal:      General: Bowel sounds are normal.      Palpations: Abdomen is soft.      Tenderness: There is no abdominal tenderness.   Musculoskeletal: Normal range of motion.         General: No tenderness.   Skin:     General: Skin is warm and dry.      Capillary Refill: Capillary refill takes less than 2 seconds.      Findings: No rash.   Neurological:      General: No focal deficit present.      Mental Status: She is oriented to person, place, and time. Mental status is at baseline.      GCS: GCS eye subscore is 4. GCS verbal subscore is 5. GCS motor subscore is 6.   Psychiatric:         Attention and Perception:  Attention normal.         Mood and Affect: Mood normal.         Speech: Speech normal.         Behavior: Behavior normal.       ED Course       Procedures           Results for orders placed or performed during the hospital encounter of 06/25/21   CBC with platelets differential     Status: None   Result Value Ref Range    WBC 7.2 4.0 - 11.0 10e9/L    RBC Count 4.59 3.8 - 5.2 10e12/L    Hemoglobin 12.7 11.7 - 15.7 g/dL    Hematocrit 38.5 35.0 - 47.0 %    MCV 84 78 - 100 fl    MCH 27.7 26.5 - 33.0 pg    MCHC 33.0 31.5 - 36.5 g/dL    RDW 12.9 10.0 - 15.0 %    Platelet Count 247 150 - 450 10e9/L    Diff Method Automated Method     % Neutrophils 69.5 %    % Lymphocytes 18.5 %    % Monocytes 10.8 %    % Eosinophils 0.4 %    % Basophils 0.4 %    % Immature Granulocytes 0.4 %    Nucleated RBCs 0 0 /100    Absolute Neutrophil 5.0 1.6 - 8.3 10e9/L    Absolute Lymphocytes 1.3 0.8 - 5.3 10e9/L    Absolute Monocytes 0.8 0.0 - 1.3 10e9/L    Absolute Eosinophils 0.0 0.0 - 0.7 10e9/L    Absolute Basophils 0.0 0.0 - 0.2 10e9/L    Abs Immature Granulocytes 0.0 0 - 0.4 10e9/L    Absolute Nucleated RBC 0.0    Comprehensive metabolic panel     Status: None   Result Value Ref Range    Sodium 136 133 - 144 mmol/L    Potassium 3.4 3.4 - 5.3 mmol/L    Chloride 105 94 - 109 mmol/L    Carbon Dioxide 23 20 - 32 mmol/L    Anion Gap 8 3 - 14 mmol/L    Glucose 81 70 - 99 mg/dL    Urea Nitrogen 10 7 - 30 mg/dL    Creatinine 0.66 0.52 - 1.04 mg/dL    GFR Estimate >90 >60 mL/min/[1.73_m2]    GFR Estimate If Black >90 >60 mL/min/[1.73_m2]    Calcium 8.9 8.5 - 10.1 mg/dL    Bilirubin Total 0.3 0.2 - 1.3 mg/dL    Albumin 3.6 3.4 - 5.0 g/dL    Protein Total 7.4 6.8 - 8.8 g/dL    Alkaline Phosphatase 74 40 - 150 U/L    ALT 15 0 - 50 U/L    AST 14 0 - 45 U/L   Lactic acid whole blood     Status: None   Result Value Ref Range    Lactic Acid 1.3 0.7 - 2.0 mmol/L   HCG quantitative pregnancy     Status: Abnormal   Result Value Ref Range    HCG Quantitative  Serum 81,840 (H) 0 - 5 IU/L   UA with Microscopic     Status: Abnormal   Result Value Ref Range    Color Urine Light Yellow     Appearance Urine Clear     Glucose Urine Negative NEG^Negative mg/dL    Bilirubin Urine Negative NEG^Negative    Ketones Urine Negative NEG^Negative mg/dL    Specific Gravity Urine 1.019 1.003 - 1.035    Blood Urine Negative NEG^Negative    pH Urine 6.5 5.0 - 7.0 pH    Protein Albumin Urine Negative NEG^Negative mg/dL    Urobilinogen mg/dL Normal 0.0 - 2.0 mg/dL    Nitrite Urine Negative NEG^Negative    Leukocyte Esterase Urine Negative NEG^Negative    Source Nasopharyngeal     WBC Urine 1 0 - 5 /HPF    RBC Urine <1 0 - 2 /HPF    Bacteria Urine None (A) NEG^Negative /HPF    Squamous Epithelial /HPF Urine 1 0 - 1 /HPF    Mucous Urine Present (A) NEG^Negative /LPF   ABO/Rh type and screen     Status: None   Result Value Ref Range    ABO O     RH(D) Neg     Antibody Screen Neg     Test Valid Only At          [facility]    Specimen Expires 06/28/2021      Medications   0.9% sodium chloride BOLUS (0 mLs Intravenous Stopped 6/25/21 2017)     Followed by   sodium chloride 0.9% infusion (has no administration in time range)   ondansetron (ZOFRAN) injection 4 mg (4 mg Intravenous Given 6/25/21 1845)        Assessments & Plan (with Medical Decision Making)   This is a 25 year old pregnant lady at 7 weeks and 1 days who presents to the ED for abdominal pain, nausea and vomiting. Given the patient's history and examination I am most concerned for the possibility of nausea and vomiting in pregnancy. Other possible causes include ectopic pregnancy, torsion, miscarriage or threatened miscarriage, placental abruption, round ligament pain, UTI. Additional considerations include appendicitis, diverticulitis and constipation.   Evaluation and management will include labs and pelvic ultrasound as well as symptom management. Disposition pending results of testing  and clinical course.     Overall blood work is reassuring, indicates well-established pregnancy, lactate is not elevated indicating good perfusion, no noted anemia, no noted leukocytosis, patient has normal renal function, no acute electrode abnormalities, UA is negative for signs of UTI.    Patient symptoms have improved, will discharge with Zofran as needed, will have her follow-up with obstetrics as scheduled.    The patient's workup and evaluation during their Emergency Department stay was reviewed with the patient. She is comfortable going home based on our discussion with her. They are amenable to this plan. They will follow up with PCP in 3 days time, and OB as scheduled. The signs/symptoms to prompt return to the Emergency Department were discussed with the patient and they expressed understanding. All questions were answered.       I have reviewed the nursing notes. I have reviewed the findings, diagnosis, plan and need for follow up with the patient.    New Prescriptions    ONDANSETRON (ZOFRAN ODT) 4 MG ODT TAB    Take 1 tablet (4 mg) by mouth every 6 hours as needed for nausea       Final diagnoses:   Nausea and vomiting during pregnancy   I, Luis Samano, am serving as a trained medical scribe to document services personally performed by Sabino Arthur MD, based on the provider's statements to me.     I, Sabino Arthur MD, was physically present and have reviewed and verified the accuracy of this note documented by Luis Samano.      --  Sabino Arthur MD  [facility] EMERGENCY DEPARTMENT  6/25/2021     Sabino Arthur MD  06/25/21 9254

## 2021-06-26 NOTE — ED NOTES
Pt was given discharge paperwork and understands to  prescription in the morning. No questions or concerns. Vital signs stable. Given food and beverage.

## 2021-09-07 ENCOUNTER — LAB REQUISITION (OUTPATIENT)
Dept: LAB | Facility: CLINIC | Age: 26
End: 2021-09-07
Payer: COMMERCIAL

## 2021-09-07 DIAGNOSIS — R30.0 DYSURIA: ICD-10-CM

## 2021-09-07 LAB
HIV 1+2 AB+HIV1 P24 AG SERPL QL IA: NONREACTIVE
T PALLIDUM AB SER QL: NONREACTIVE

## 2021-09-07 PROCEDURE — 87389 HIV-1 AG W/HIV-1&-2 AB AG IA: CPT | Mod: ORL | Performed by: PEDIATRICS

## 2021-09-07 PROCEDURE — 87591 N.GONORRHOEAE DNA AMP PROB: CPT | Mod: ORL | Performed by: PEDIATRICS

## 2021-09-07 PROCEDURE — 87491 CHLMYD TRACH DNA AMP PROBE: CPT | Mod: ORL | Performed by: PEDIATRICS

## 2021-09-07 PROCEDURE — 86780 TREPONEMA PALLIDUM: CPT | Mod: ORL | Performed by: PEDIATRICS

## 2021-09-08 LAB
C TRACH DNA SPEC QL NAA+PROBE: NEGATIVE
N GONORRHOEA DNA SPEC QL NAA+PROBE: NEGATIVE

## 2021-10-28 ENCOUNTER — LAB REQUISITION (OUTPATIENT)
Dept: LAB | Facility: CLINIC | Age: 26
End: 2021-10-28
Payer: COMMERCIAL

## 2021-10-28 DIAGNOSIS — Z32.00 ENCOUNTER FOR PREGNANCY TEST, RESULT UNKNOWN: ICD-10-CM

## 2021-10-28 LAB
HIV 1+2 AB+HIV1 P24 AG SERPL QL IA: NONREACTIVE
T PALLIDUM AB SER QL: NONREACTIVE

## 2021-10-28 PROCEDURE — 87591 N.GONORRHOEAE DNA AMP PROB: CPT | Mod: ORL | Performed by: NURSE PRACTITIONER

## 2021-10-28 PROCEDURE — 87491 CHLMYD TRACH DNA AMP PROBE: CPT | Mod: ORL | Performed by: NURSE PRACTITIONER

## 2021-10-28 PROCEDURE — 86780 TREPONEMA PALLIDUM: CPT | Mod: ORL | Performed by: NURSE PRACTITIONER

## 2021-10-28 PROCEDURE — 87389 HIV-1 AG W/HIV-1&-2 AB AG IA: CPT | Mod: ORL | Performed by: NURSE PRACTITIONER

## 2021-10-29 LAB
C TRACH DNA SPEC QL NAA+PROBE: NEGATIVE
N GONORRHOEA DNA SPEC QL NAA+PROBE: NEGATIVE

## 2021-11-12 ENCOUNTER — LAB REQUISITION (OUTPATIENT)
Dept: LAB | Facility: CLINIC | Age: 26
End: 2021-11-12
Payer: COMMERCIAL

## 2021-11-12 DIAGNOSIS — O09.91 SUPERVISION OF HIGH RISK PREGNANCY, UNSPECIFIED, FIRST TRIMESTER: ICD-10-CM

## 2021-11-12 DIAGNOSIS — E55.9 VITAMIN D DEFICIENCY: Primary | ICD-10-CM

## 2021-11-12 LAB
ALT SERPL W P-5'-P-CCNC: 18 U/L (ref 0–50)
AST SERPL W P-5'-P-CCNC: 10 U/L (ref 0–45)
CREAT SERPL-MCNC: 0.47 MG/DL (ref 0.52–1.04)
CREAT UR-MCNC: 249 MG/DL
DEPRECATED CALCIDIOL+CALCIFEROL SERPL-MC: 7 UG/L (ref 20–75)
GFR SERPL CREATININE-BSD FRML MDRD: >90 ML/MIN/1.73M2
HBV SURFACE AB SERPL IA-ACNC: 16.15 M[IU]/ML
HBV SURFACE AG SERPL QL IA: NONREACTIVE
HCV AB SERPL QL IA: NONREACTIVE
HIV 1+2 AB+HIV1 P24 AG SERPL QL IA: NONREACTIVE
PROT UR-MCNC: 0.22 G/L
PROT/CREAT 24H UR: 0.09 G/G CR (ref 0–0.2)
T PALLIDUM AB SER QL: NONREACTIVE

## 2021-11-12 PROCEDURE — 84450 TRANSFERASE (AST) (SGOT): CPT | Mod: ORL | Performed by: MIDWIFE

## 2021-11-12 PROCEDURE — 86780 TREPONEMA PALLIDUM: CPT | Mod: ORL | Performed by: MIDWIFE

## 2021-11-12 PROCEDURE — 87340 HEPATITIS B SURFACE AG IA: CPT | Mod: ORL | Performed by: MIDWIFE

## 2021-11-12 PROCEDURE — 82565 ASSAY OF CREATININE: CPT | Mod: ORL | Performed by: MIDWIFE

## 2021-11-12 PROCEDURE — 87389 HIV-1 AG W/HIV-1&-2 AB AG IA: CPT | Mod: ORL | Performed by: MIDWIFE

## 2021-11-12 PROCEDURE — 86787 VARICELLA-ZOSTER ANTIBODY: CPT | Mod: ORL | Performed by: MIDWIFE

## 2021-11-12 PROCEDURE — 84460 ALANINE AMINO (ALT) (SGPT): CPT | Mod: ORL | Performed by: MIDWIFE

## 2021-11-12 PROCEDURE — 86706 HEP B SURFACE ANTIBODY: CPT | Mod: ORL | Performed by: MIDWIFE

## 2021-11-12 PROCEDURE — 82306 VITAMIN D 25 HYDROXY: CPT | Mod: ORL | Performed by: MIDWIFE

## 2021-11-12 PROCEDURE — 86803 HEPATITIS C AB TEST: CPT | Mod: ORL | Performed by: MIDWIFE

## 2021-11-12 PROCEDURE — 84156 ASSAY OF PROTEIN URINE: CPT | Mod: ORL | Performed by: MIDWIFE

## 2021-11-12 PROCEDURE — 86762 RUBELLA ANTIBODY: CPT | Mod: ORL | Performed by: MIDWIFE

## 2021-11-12 PROCEDURE — 87086 URINE CULTURE/COLONY COUNT: CPT | Mod: ORL | Performed by: MIDWIFE

## 2021-11-13 LAB — BACTERIA UR CULT: NORMAL

## 2021-11-14 PROBLEM — E55.9 VITAMIN D DEFICIENCY: Status: ACTIVE | Noted: 2021-11-14

## 2021-11-15 ENCOUNTER — TELEPHONE (OUTPATIENT)
Dept: OBGYN | Facility: CLINIC | Age: 26
End: 2021-11-15
Payer: COMMERCIAL

## 2021-11-15 DIAGNOSIS — E55.9 VITAMIN D DEFICIENCY: Primary | ICD-10-CM

## 2021-11-15 LAB
RUBV IGG SERPL QL IA: 4.76 INDEX
RUBV IGG SERPL QL IA: POSITIVE
VZV IGG SER QL IA: 102.2 INDEX
VZV IGG SER QL IA: NORMAL

## 2021-11-15 RX ORDER — CHOLECALCIFEROL (VITAMIN D3) 50 MCG
2 TABLET ORAL DAILY
Qty: 180 TABLET | Refills: 3 | Status: SHIPPED | OUTPATIENT
Start: 2021-11-15

## 2021-11-19 ENCOUNTER — HOSPITAL ENCOUNTER (OUTPATIENT)
Dept: ULTRASOUND IMAGING | Facility: CLINIC | Age: 26
Discharge: HOME | End: 2021-11-19
Attending: MIDWIFE | Admitting: MIDWIFE
Payer: COMMERCIAL

## 2021-11-19 DIAGNOSIS — N91.2 AMENORRHEA: ICD-10-CM

## 2021-11-19 PROCEDURE — 76801 OB US < 14 WKS SINGLE FETUS: CPT | Mod: 26 | Performed by: RADIOLOGY

## 2021-11-19 PROCEDURE — 76801 OB US < 14 WKS SINGLE FETUS: CPT

## 2021-11-22 ENCOUNTER — TRANSCRIBE ORDERS (OUTPATIENT)
Dept: MATERNAL FETAL MEDICINE | Facility: CLINIC | Age: 26
End: 2021-11-22
Payer: COMMERCIAL

## 2021-11-22 DIAGNOSIS — Z13.79 ENCOUNTER FOR GENETIC SCREENING: Primary | ICD-10-CM

## 2021-11-22 DIAGNOSIS — O26.90 PREGNANCY RELATED CONDITION, ANTEPARTUM: Primary | ICD-10-CM

## 2021-12-17 ENCOUNTER — LAB REQUISITION (OUTPATIENT)
Dept: LAB | Facility: CLINIC | Age: 26
End: 2021-12-17
Payer: COMMERCIAL

## 2021-12-17 DIAGNOSIS — O09.91 SUPERVISION OF HIGH RISK PREGNANCY, UNSPECIFIED, FIRST TRIMESTER: ICD-10-CM

## 2021-12-17 DIAGNOSIS — O09.92 HIGH-RISK PREGNANCY IN SECOND TRIMESTER: Primary | ICD-10-CM

## 2021-12-17 PROCEDURE — 87086 URINE CULTURE/COLONY COUNT: CPT | Mod: ORL | Performed by: MIDWIFE

## 2021-12-18 LAB — BACTERIA UR CULT: NORMAL

## 2021-12-28 ENCOUNTER — TELEPHONE (OUTPATIENT)
Dept: MATERNAL FETAL MEDICINE | Facility: CLINIC | Age: 26
End: 2021-12-28
Payer: COMMERCIAL

## 2021-12-28 NOTE — TELEPHONE ENCOUNTER
[facility] received referral to schedule patient for first trimester screen and level II ultrasound. [facility] called patient three times with no return calls. Patient is now outside gestational window for FTS appointment. [facility] will continue attempting to contact patient to schedule level II ultrasound.    Referring clinic notified. Removing orders for FTS.    [facility] Main Scheduling

## 2022-01-03 ENCOUNTER — HOSPITAL ENCOUNTER (EMERGENCY)
Facility: CLINIC | Age: 27
Discharge: HOME | End: 2022-01-03
Attending: FAMILY MEDICINE | Admitting: FAMILY MEDICINE
Payer: COMMERCIAL

## 2022-01-03 VITALS
OXYGEN SATURATION: 99 % | SYSTOLIC BLOOD PRESSURE: 109 MMHG | DIASTOLIC BLOOD PRESSURE: 77 MMHG | BODY MASS INDEX: 29.83 KG/M2 | WEIGHT: 202 LBS | HEART RATE: 94 BPM | TEMPERATURE: 97.5 F | RESPIRATION RATE: 16 BRPM

## 2022-01-03 DIAGNOSIS — J06.9 VIRAL UPPER RESPIRATORY TRACT INFECTION: ICD-10-CM

## 2022-01-03 DIAGNOSIS — Z20.822 SUSPECTED 2019 NOVEL CORONAVIRUS INFECTION: ICD-10-CM

## 2022-01-03 DIAGNOSIS — O98.512: ICD-10-CM

## 2022-01-03 DIAGNOSIS — Z3A.15 15 WEEKS GESTATION OF PREGNANCY: ICD-10-CM

## 2022-01-03 DIAGNOSIS — J06.9 ACUTE RESPIRATORY DISEASE: ICD-10-CM

## 2022-01-03 DIAGNOSIS — U07.1 LAB TEST POSITIVE FOR DETECTION OF COVID-19 VIRUS: ICD-10-CM

## 2022-01-03 LAB
FLUAV RNA SPEC QL NAA+PROBE: NEGATIVE
FLUBV RNA RESP QL NAA+PROBE: NEGATIVE
RSV RNA SPEC NAA+PROBE: NEGATIVE
SARS-COV-2 RNA RESP QL NAA+PROBE: POSITIVE

## 2022-01-03 PROCEDURE — 76815 OB US LIMITED FETUS(S): CPT | Mod: 26 | Performed by: FAMILY MEDICINE

## 2022-01-03 PROCEDURE — 94640 AIRWAY INHALATION TREATMENT: CPT

## 2022-01-03 PROCEDURE — 99284 EMERGENCY DEPT VISIT MOD MDM: CPT | Mod: 25 | Performed by: FAMILY MEDICINE

## 2022-01-03 PROCEDURE — 250N000013 HC RX MED GY IP 250 OP 250 PS 637: Performed by: FAMILY MEDICINE

## 2022-01-03 PROCEDURE — C9803 HOPD COVID-19 SPEC COLLECT: HCPCS

## 2022-01-03 PROCEDURE — 87637 SARSCOV2&INF A&B&RSV AMP PRB: CPT | Performed by: FAMILY MEDICINE

## 2022-01-03 PROCEDURE — 99284 EMERGENCY DEPT VISIT MOD MDM: CPT | Mod: 25

## 2022-01-03 PROCEDURE — 76815 OB US LIMITED FETUS(S): CPT

## 2022-01-03 RX ORDER — ONDANSETRON 4 MG/1
4 TABLET, ORALLY DISINTEGRATING ORAL PRN
COMMUNITY
Start: 2021-12-17 | End: 2022-01-03

## 2022-01-03 RX ORDER — ONDANSETRON 4 MG/1
8 TABLET, ORALLY DISINTEGRATING ORAL EVERY 8 HOURS PRN
Qty: 10 TABLET | Refills: 0 | Status: SHIPPED | OUTPATIENT
Start: 2022-01-03 | End: 2022-01-06

## 2022-01-03 RX ORDER — PRENATAL VIT/IRON FUM/FOLIC AC 27MG-0.8MG
1 TABLET ORAL DAILY
COMMUNITY
Start: 2021-10-29

## 2022-01-03 RX ORDER — ALBUTEROL SULFATE 90 UG/1
2 AEROSOL, METERED RESPIRATORY (INHALATION) EVERY 6 HOURS PRN
Qty: 18 G | Refills: 0 | Status: SHIPPED | OUTPATIENT
Start: 2022-01-03

## 2022-01-03 RX ORDER — ALBUTEROL SULFATE 90 UG/1
2 AEROSOL, METERED RESPIRATORY (INHALATION) ONCE
Status: COMPLETED | OUTPATIENT
Start: 2022-01-03 | End: 2022-01-03

## 2022-01-03 RX ADMIN — ALBUTEROL SULFATE 2 PUFF: 90 AEROSOL, METERED RESPIRATORY (INHALATION) at 17:33

## 2022-01-03 NOTE — ED TRIAGE NOTES
Patient claims she usually takes albuterol inhaler , patient requested a refill 2 weeks ago but had a hard time getting a refill.

## 2022-01-04 NOTE — ED PROVIDER NOTES
ED Provider Note  [facility]      History     Chief Complaint   Patient presents with     Shortness of Breath     SOB since last night, hx of asthma     Cough     dry cough since yesterday     HPI  Vandana Lagos is a 26 year old female who presents with 5-day history of persistent worsening cough.  She does have a history of mild intermittent asthma and ran out of her inhaler.  Cough worsened yesterday.  No fever, chills, body aches or loss of taste or smell.  She does have a slight runny nose.  Patient is 15 weeks pregnant.  No chest pain, abdominal pain, leakage of fluid or vaginal bleeding.  No other complaints.    Past Medical History  Past Medical History:   Diagnosis Date     Preeclampsia 2017     Uncomplicated asthma      History reviewed. No pertinent surgical history.  acetaminophen (TYLENOL) 325 MG tablet  albuterol (PROAIR HFA/PROVENTIL HFA/VENTOLIN HFA) 108 (90 Base) MCG/ACT inhaler  hydrocortisone (CORTAID) 1 % external cream  ondansetron (ZOFRAN ODT) 4 MG ODT tab  Prenatal Vit-Fe Fumarate-FA (PRENATAL MULTIVITAMIN W/IRON) 27-0.8 MG tablet  vitamin D3 (CHOLECALCIFEROL) 50 mcg (2000 units) tablet      No Known Allergies  Family History  History reviewed. No pertinent family history.  Social History   Social History     Tobacco Use     Smoking status: Former Smoker     Quit date: 2017     Years since quittin.9     Smokeless tobacco: Never Used   Substance Use Topics     Alcohol use: No     Drug use: No      Past medical history, past surgical history, medications, allergies, family history, and social history were reviewed with the patient. No additional pertinent items.       Review of Systems  A complete review of systems was performed with pertinent positives and negatives noted in the HPI, and all other systems negative.    Physical Exam   BP: 109/77  Pulse: 94  Temp: 97.5  F (36.4  C)  Resp: 16  Weight: 91.6 kg (202 lb)  SpO2: 99 %  Physical  Exam  Vitals and nursing note reviewed.   Constitutional:       General: She is not in acute distress.     Appearance: She is not diaphoretic.   HENT:      Head: Atraumatic.      Nose: Rhinorrhea present.      Mouth/Throat:      Pharynx: No oropharyngeal exudate.   Eyes:      General: No scleral icterus.     Pupils: Pupils are equal, round, and reactive to light.   Cardiovascular:      Heart sounds: Normal heart sounds.   Pulmonary:      Effort: No respiratory distress.      Breath sounds: Normal breath sounds.   Abdominal:      General: Bowel sounds are normal.      Palpations: Abdomen is soft.      Tenderness: There is no abdominal tenderness.      Comments: Gravid, fetal heart tones in the 140s with bedside Doppler.   Musculoskeletal:         General: No tenderness.   Skin:     General: Skin is warm.      Findings: No rash.         ED Course      Procedures  Results for orders placed during the hospital encounter of 01/03/22    POC US OB TRANSABDOMINAL LIMITED    Impression  Limited Bedside Transabdominal ultrasound for evaluation of IUP  Performed any interpreted by me.    Indication: Fever and cough, Covid positive  Findings:  The lower abdomen was interrogated with a curvilinear probe. The uterus was identified.  Within the uterus there is a moving fetus with visible heart rate with FHR of 140    Impression: Intrauterine pregnancy       The medical record was reviewed and interpreted.  Current labs reviewed and interpreted.  Managed outpatient prescription medications.              Results for orders placed or performed during the hospital encounter of 01/03/22   POC US OB TRANSABDOMINAL LIMITED     Status: None    Impression    Limited Bedside Transabdominal ultrasound for evaluation of IUP        Performed any interpreted by me.    Indication: Fever and cough, Covid positive  Findings:  The lower abdomen was interrogated with a curvilinear probe. The uterus was identified.   Within the uterus there is a  moving fetus with visible heart rate with FHR of 140    Impression: Intrauterine pregnancy   Symptomatic; Yes; 12/30/2021 Influenza A/B & SARS-CoV2 (COVID-19) Virus PCR Multiplex Nasopharyngeal     Status: Abnormal    Specimen: Nasopharyngeal; Swab   Result Value Ref Range    Influenza A PCR Negative Negative    Influenza B PCR Negative Negative    RSV PCR Negative Negative    SARS CoV2 PCR Positive (A) Negative, Testing sent to reference lab. Results will be returned via unsolicited result    Narrative    Testing was performed using the Xpert Xpress CoV2/Flu/RSV Assay on the Cepheid GeneXpert Instrument. This test should be ordered for the detection of SARS-CoV-2 and influenza viruses in individuals who meet clinical and/or epidemiological criteria. Test performance is unknown in asymptomatic patients. This test is for in vitro diagnostic use under the FDA EUA for laboratories certified under CLIA to perform high or moderate complexity testing. This test has not been FDA cleared or approved. A negative result does not rule out the presence of PCR inhibitors in the specimen or target RNA in concentration below the limit of detection for the assay. If only one viral target is positive but coinfection with multiple targets is suspected, the sample should be re-tested with another FDA cleared, approved, or authorized test, if coinfection would change clinical management. This test was validated by the [facility]. These laboratories are certified under the Clinical  Laboratory Improvement Amendments of 1988 (CLIA-88) as qualified to perform high complexity laboratory testing.     Medications   albuterol (PROVENTIL HFA/VENTOLIN HFA) inhaler (2 puffs Inhalation Given 1/3/22 3198)        Assessments & Plan (with Medical Decision Making)   A 26-year-old woman with a history of mild intermittent asthma presenting with 5-day history of cough, progressively worse in the last 24 hours after running out of  her inhaler.  My initial differential diagnosis includes an acute exacerbation of asthma, COVID-19, upper airway obstruction, other viral or bacterial upper respiratory infection, pneumonia, congestive heart failure, allergic reaction, pulmonary embolism, as well as other life threatening and non-life-threatening causes of wheezing and chest tightness.  On exam her vitals are normal and she is in no respiratory distress.  She has rhinorrhea and mild posterior pharyngeal injection.  Good air movement in the lungs can speak in complete sentences.  Abdomen is gravid nontender with normal fetal heart tones.  The remainder of a complete physical exam is normal.  Covid testing positive.  Patient in no respiratory distress, otherwise healthy, appears appropriate for an outpatient level of care.  We discussed the indications for emergency department return and follow-up.  Stable for discharge.      I have reviewed the nursing notes. I have reviewed the findings, diagnosis, plan and need for follow up with the patient.    Discharge Medication List as of 1/3/2022  7:45 PM      START taking these medications    Details   albuterol (PROAIR HFA/PROVENTIL HFA/VENTOLIN HFA) 108 (90 Base) MCG/ACT inhaler Inhale 2 puffs into the lungs every 6 hours as needed for shortness of breath / dyspnea or wheezing, Disp-18 g, R-0, Local Print             Final diagnoses:   Viral upper respiratory tract infection   Suspected 2019 novel coronavirus infection       --  Arvind Guerra MD  [facility] EMERGENCY DEPARTMENT  1/3/2022     Arvind Guerra MD  01/03/22 7413

## 2022-01-04 NOTE — DISCHARGE INSTRUCTIONS
Rest, fluids, use albuterol inhaler.  See Covid discharge instructions if your test which is currently pending is positive.

## 2022-01-06 ENCOUNTER — TELEPHONE (OUTPATIENT)
Dept: EMERGENCY MEDICINE | Facility: CLINIC | Age: 27
End: 2022-01-06
Payer: COMMERCIAL

## 2022-01-06 NOTE — TELEPHONE ENCOUNTER
"Coronavirus (COVID-19) Notification    Caller Name (Patient, parent, daughter/son, grandparent, etc)  Patient    Reason for call  Notify of Positive Coronavirus (COVID-19) lab results, assess symptoms,  review  [facility] recommendations    Lab Result    Lab test:  2019-nCoV rRt-PCR or SARS-CoV-2 PCR    Oropharyngeal AND/OR nasopharyngeal swabs is POSITIVE for 2019-nCoV RNA/SARS-COV-2 PCR (COVID-19 virus)    RN Recommendations/Instructions per [facility] Coronavirus COVID-19 recommendations    Brief introduction script  Introduce self then review script:  \"I am calling on behalf of [employer].  We were notified that your Coronavirus test (COVID-19) for was POSITIVE for the virus.  I have some information to relay to you but first I wanted to mention that the [place] Dept of Health will be contacting you shortly [it's possible MD already called Patient] to talk to you more about how you are feeling and other people you have had contact with who might now also have the virus.  Also,  [employer] is Partnering with the [facility] for Covid-19 research, you may be contacted directly by research staff.\"    Assessment (Inquire about Patient's current symptoms)   Assessment   Current Symptoms at time of phone call: (if no symptoms, document No symptoms] None   Symptoms onset (if applicable) 12/31/21     If at time of call, Patients symptoms hare worsened, the Patient should contact 911 or have someone drive them to Emergency Dept promptly:      If Patient calling 911, inform 911 personal that you have tested positive for the Coronavirus (COVID-19).  Place mask on and await 911 to arrive.    If Emergency Dept, If possible, please have another adult drive you to the Emergency Dept but you need to wear mask when in contact with other people.      Monoclonal Antibody Administration    You may be eligible to receive a new treatment with a monoclonal antibody for preventing hospitalization in patients " at high risk for complications from COVID-19.   This medication is still experimental and available on a limited basis; it is given through an IV and must be given at an infusion center. Please note that not all people who are eligible will receive the medication since it is in limited supply.     Are you interested in being considered for this medication?  No.   Does the patient fit the criteria: No    Review information with Patient    Your result was positive. This means you have COVID-19 (coronavirus).  We have sent you a letter that reviews the information that I'll be reviewing with you now.    How can I protect others?    If you have symptoms: stay home and away from others (self-isolate) until:    You've had no fever--and no medicine that reduces fever--for 1 full day (24 hours). And       Your other symptoms have gotten better. For example, your cough or breathing has improved. And     At least 10 days have passed since your symptoms started. (If you've been told by a doctor that you have a weak immune system, wait 20 days.)     If you don't have symptoms: Stay home and away from others (self-isolate) until at least 10 days have passed since your first positive COVID-19 test. (Date test collected)    During this time:    Stay in your own room, including for meals. Use your own bathroom if you can.    Stay away from others in your home. No hugging, kissing or shaking hands. No visitors.     Don't go to work, school or anywhere else.     Clean  high touch  surfaces often (doorknobs, counters, handles, etc.). Use a household cleaning spray or wipes. You'll find a full list on the EPA website at www.epa.gov/pesticide-registration/list-n-disinfectants-use-against-sars-cov-2.     Cover your mouth and nose with a mask, tissue or other face covering to avoid spreading germs.    Wash your hands and face often with soap and water.    Make a list of people you have been in close contact with recently, even if either  of you wore a face covering.   - Start your list from 2 days before you became ill or had a positive test.  - Include anyone that was within 6 feet of you for a cumulative total of 15 minutes or more in 24 hours. (Example: if you sat next to Valeriano for 5 minutes in the morning and 10 minutes in the afternoon, then you were in close contact for 15 minutes total that day. Valeriano would be added to your list.)    A public health worker will call or text you. It is important that you answer. They will ask you questions about possible exposures to COVID-19, such as people you have been in direct contact with and places you have visited.    Tell the people on your list that you have COVID-19; they should stay away from others for 14 days starting from the last time they were in contact with you (unless you are told something different from a public health worker).     Caregivers in these groups are at risk for severe illness due to COVID-19:  o People 65 years and older  o People who live in a nursing home or long-term care facility  o People with chronic disease (lung, heart, cancer, diabetes, kidney, liver, immunologic)  o People who have a weakened immune system, including those who:  - Are in cancer treatment  - Take medicine that weakens the immune system, such as corticosteroids  - Had a bone marrow or organ transplant  - Have an immune deficiency  - Have poorly controlled HIV or AIDS  - Are obese (body mass index of 40 or higher)  - Smoke regularly    Caregivers should wear gloves while washing dishes, handling laundry and cleaning bedrooms and bathrooms.    Wash and dry laundry with special caution. Don't shake dirty laundry, and use the warmest water setting you can.    If you have a weakened immune system, ask your doctor about other actions you should take.    For more tips, go to www.cdc.gov/coronavirus/2019-ncov/downloads/10Things.pdf.    You should not go back to work until you meet the guidelines above for  ending your home isolation. You don't need to be retested for COVID-19 before going back to work--studies show that you won't spread the virus if it's been at least 10 days since your symptoms started (or 20 days, if you have a weak immune system).    Employers: This document serves as formal notice of your employee's medical guidelines for going back to work. They must meet the above guidelines before going back to work in person.    How can I take care of myself?    1. Get lots of rest. Drink extra fluids (unless a doctor has told you not to).    2. Take Tylenol (acetaminophen) for fever or pain. If you have liver or kidney problems, ask your family doctor if it's okay to take Tylenol.     Take either:     650 mg (two 325 mg pills) every 4 to 6 hours, or     1,000 mg (two 500 mg pills) every 8 hours as needed.     Note: Don't take more than 3,000 mg in one day. Acetaminophen is found in many medicines (both prescribed and over-the-counter medicines). Read all labels to be sure you don't take too much.    For children, check the Tylenol bottle for the right dose (based on their age or weight).    3. If you have other health problems (like cancer, heart failure, an organ transplant or severe kidney disease): Call your specialty clinic if you don't feel better in the next 2 days.    4. Know when to call 911: Emergency warning signs include:    Trouble breathing or shortness of breath    Pain or pressure in the chest that doesn't go away    Feeling confused like you haven't felt before, or not being able to wake up    Bluish-colored lips or face    5. Sign up for [employer] We know it's scary to hear that you have COVID-19. We want to track your symptoms to make sure you're okay over the next 2 weeks. Please look for an email from [employer]--this is a free, online program that we'll use to keep in touch. To sign up, follow the link in the email. Learn more at [email or web address].    Where can I get more  information?    [facility]: [email or web address]    Coronavirus Basics: www.health.[facility].[place]/diseases/coronavirus/basics.html    What to Do If You're Sick: www.cdc.gov/coronavirus/2019-ncov/about/steps-when-sick.html    Ending Home Isolation: www.cdc.gov/coronavirus/2019-ncov/hcp/disposition-in-home-patients.html     Caring for Someone with COVID-19: www.cdc.gov/coronavirus/2019-ncov/if-you-are-sick/care-for-someone.html     [facility] clinical trials (COVID-19 research studies): clinicalaffairs.[facility]-clinical-trials     A Positive COVID-19 letter will be sent via [employer] or the mail. (Exception, no letters sent to Presurgerical/Preprocedure Patients)    Tracy Hankins LPN

## 2022-01-14 ENCOUNTER — TRANSCRIBE ORDERS (OUTPATIENT)
Dept: MATERNAL FETAL MEDICINE | Facility: CLINIC | Age: 27
End: 2022-01-14
Payer: COMMERCIAL

## 2022-01-14 DIAGNOSIS — O26.90 PREGNANCY RELATED CONDITION, ANTEPARTUM: Primary | ICD-10-CM

## 2022-01-19 ENCOUNTER — LAB REQUISITION (OUTPATIENT)
Dept: LAB | Facility: CLINIC | Age: 27
End: 2022-01-19
Payer: COMMERCIAL

## 2022-01-19 DIAGNOSIS — O09.92 SUPERVISION OF HIGH RISK PREGNANCY, UNSPECIFIED, SECOND TRIMESTER: ICD-10-CM

## 2022-01-19 PROCEDURE — 87591 N.GONORRHOEAE DNA AMP PROB: CPT | Mod: ORL | Performed by: ADVANCED PRACTICE MIDWIFE

## 2022-01-19 PROCEDURE — 87491 CHLMYD TRACH DNA AMP PROBE: CPT | Mod: ORL | Performed by: ADVANCED PRACTICE MIDWIFE

## 2022-01-19 PROCEDURE — 81511 FTL CGEN ABNOR FOUR ANAL: CPT | Mod: ORL | Performed by: ADVANCED PRACTICE MIDWIFE

## 2022-01-20 ENCOUNTER — PRE VISIT (OUTPATIENT)
Dept: MATERNAL FETAL MEDICINE | Facility: CLINIC | Age: 27
End: 2022-01-20
Payer: COMMERCIAL

## 2022-01-20 LAB
C TRACH DNA SPEC QL NAA+PROBE: NEGATIVE
N GONORRHOEA DNA SPEC QL NAA+PROBE: NEGATIVE

## 2022-01-25 ENCOUNTER — OFFICE VISIT (OUTPATIENT)
Dept: MATERNAL FETAL MEDICINE | Facility: CLINIC | Age: 27
End: 2022-01-25
Attending: MIDWIFE
Payer: COMMERCIAL

## 2022-01-25 ENCOUNTER — HOSPITAL ENCOUNTER (OUTPATIENT)
Dept: ULTRASOUND IMAGING | Facility: CLINIC | Age: 27
End: 2022-01-25
Attending: MIDWIFE
Payer: COMMERCIAL

## 2022-01-25 DIAGNOSIS — O98.519 COVID-19 AFFECTING PREGNANCY, ANTEPARTUM: Primary | ICD-10-CM

## 2022-01-25 DIAGNOSIS — O26.90 PREGNANCY RELATED CONDITION, ANTEPARTUM: ICD-10-CM

## 2022-01-25 DIAGNOSIS — U07.1 COVID-19 AFFECTING PREGNANCY, ANTEPARTUM: Primary | ICD-10-CM

## 2022-01-25 PROCEDURE — 76811 OB US DETAILED SNGL FETUS: CPT | Mod: 26 | Performed by: OBSTETRICS & GYNECOLOGY

## 2022-01-25 PROCEDURE — 76811 OB US DETAILED SNGL FETUS: CPT

## 2022-01-25 NOTE — PROGRESS NOTES
"Please see \"Imaging\" tab under \"Chart Review\" for details of today's US at the [facility].    Jose Alejandro Mendoza MD  Maternal-Fetal Medicine      "

## 2022-03-04 ENCOUNTER — TELEPHONE (OUTPATIENT)
Dept: OBGYN | Facility: CLINIC | Age: 27
End: 2022-03-04
Payer: COMMERCIAL

## 2022-03-04 NOTE — TELEPHONE ENCOUNTER
Call received from patient reporting severe headache, describes migraine like, states she feels like she can't open her eyes, denies vision changes. States she took 2 aspirin 2 hours ago with no improvement of symptoms. Pt denies s/s of PTL, reports normal fetal movement.     Pt is seen at [facility]. Advised I would reach out to the on-call CNM who would be calling to assess further.     Spoke with HUNTER Haynes CNM, states that she will reach out to [facility] to return call to patient.

## 2022-03-23 ENCOUNTER — LAB REQUISITION (OUTPATIENT)
Dept: LAB | Facility: CLINIC | Age: 27
End: 2022-03-23
Payer: COMMERCIAL

## 2022-03-23 DIAGNOSIS — Z11.3 ENCOUNTER FOR SCREENING FOR INFECTIONS WITH A PREDOMINANTLY SEXUAL MODE OF TRANSMISSION: ICD-10-CM

## 2022-03-23 PROCEDURE — 86780 TREPONEMA PALLIDUM: CPT | Performed by: REGISTERED NURSE

## 2022-03-23 PROCEDURE — 87591 N.GONORRHOEAE DNA AMP PROB: CPT | Performed by: REGISTERED NURSE

## 2022-03-23 PROCEDURE — 87491 CHLMYD TRACH DNA AMP PROBE: CPT | Performed by: REGISTERED NURSE

## 2022-03-24 LAB
C TRACH DNA SPEC QL NAA+PROBE: NEGATIVE
N GONORRHOEA DNA SPEC QL NAA+PROBE: NEGATIVE
T PALLIDUM AB SER QL: NONREACTIVE

## 2022-04-04 ENCOUNTER — LAB REQUISITION (OUTPATIENT)
Dept: LAB | Facility: CLINIC | Age: 27
End: 2022-04-04
Payer: COMMERCIAL

## 2022-04-04 DIAGNOSIS — O09.93 SUPERVISION OF HIGH RISK PREGNANCY, UNSPECIFIED, THIRD TRIMESTER: ICD-10-CM

## 2022-04-04 PROCEDURE — 86901 BLOOD TYPING SEROLOGIC RH(D): CPT | Performed by: ADVANCED PRACTICE MIDWIFE

## 2022-04-04 PROCEDURE — 86780 TREPONEMA PALLIDUM: CPT | Mod: ORL | Performed by: ADVANCED PRACTICE MIDWIFE

## 2022-04-04 PROCEDURE — 82306 VITAMIN D 25 HYDROXY: CPT | Performed by: ADVANCED PRACTICE MIDWIFE

## 2022-04-05 ENCOUNTER — LAB REQUISITION (OUTPATIENT)
Dept: LAB | Facility: CLINIC | Age: 27
End: 2022-04-05
Payer: COMMERCIAL

## 2022-04-05 DIAGNOSIS — O09.93 SUPERVISION OF HIGH RISK PREGNANCY, UNSPECIFIED, THIRD TRIMESTER: ICD-10-CM

## 2022-04-05 LAB
ABO/RH(D): NORMAL
SPECIMEN EXPIRATION DATE: NORMAL

## 2022-04-06 LAB
DEPRECATED CALCIDIOL+CALCIFEROL SERPL-MC: 20 UG/L (ref 20–75)
T PALLIDUM AB SER QL: NONREACTIVE

## 2022-04-11 DIAGNOSIS — D50.8 IRON DEFICIENCY ANEMIA SECONDARY TO INADEQUATE DIETARY IRON INTAKE: Primary | ICD-10-CM

## 2022-04-11 DIAGNOSIS — O09.93 HRP (HIGH RISK PREGNANCY), THIRD TRIMESTER: ICD-10-CM

## 2022-04-11 RX ORDER — EPINEPHRINE 1 MG/ML
0.3 INJECTION, SOLUTION, CONCENTRATE INTRAVENOUS EVERY 5 MIN PRN
Status: CANCELLED | OUTPATIENT
Start: 2022-04-14

## 2022-04-11 RX ORDER — ALBUTEROL SULFATE 90 UG/1
1-2 AEROSOL, METERED RESPIRATORY (INHALATION)
Status: CANCELLED
Start: 2022-04-14

## 2022-04-11 RX ORDER — HEPARIN SODIUM,PORCINE 10 UNIT/ML
5 VIAL (ML) INTRAVENOUS
Status: CANCELLED | OUTPATIENT
Start: 2022-04-14

## 2022-04-11 RX ORDER — DIPHENHYDRAMINE HYDROCHLORIDE 50 MG/ML
50 INJECTION INTRAMUSCULAR; INTRAVENOUS
Status: CANCELLED
Start: 2022-04-14

## 2022-04-11 RX ORDER — MEPERIDINE HYDROCHLORIDE 25 MG/ML
25 INJECTION INTRAMUSCULAR; INTRAVENOUS; SUBCUTANEOUS EVERY 30 MIN PRN
Status: CANCELLED | OUTPATIENT
Start: 2022-04-14

## 2022-04-11 RX ORDER — HEPARIN SODIUM (PORCINE) LOCK FLUSH IV SOLN 100 UNIT/ML 100 UNIT/ML
5 SOLUTION INTRAVENOUS
Status: CANCELLED | OUTPATIENT
Start: 2022-04-14

## 2022-04-11 RX ORDER — METHYLPREDNISOLONE SODIUM SUCCINATE 125 MG/2ML
125 INJECTION, POWDER, LYOPHILIZED, FOR SOLUTION INTRAMUSCULAR; INTRAVENOUS
Status: CANCELLED
Start: 2022-04-14

## 2022-04-11 RX ORDER — ALBUTEROL SULFATE 0.83 MG/ML
2.5 SOLUTION RESPIRATORY (INHALATION)
Status: CANCELLED | OUTPATIENT
Start: 2022-04-14

## 2022-04-11 RX ORDER — NALOXONE HYDROCHLORIDE 0.4 MG/ML
0.2 INJECTION, SOLUTION INTRAMUSCULAR; INTRAVENOUS; SUBCUTANEOUS
Status: CANCELLED | OUTPATIENT
Start: 2022-04-14

## 2022-04-12 ENCOUNTER — DOCUMENTATION ONLY (OUTPATIENT)
Dept: INFUSION THERAPY | Facility: CLINIC | Age: 27
End: 2022-04-12
Payer: COMMERCIAL

## 2022-04-12 NOTE — PROGRESS NOTES
Kamille from [facility] clinic contacted [facility] to assist in getting patient scheduled for Injectafer. Initially, when attempting to contact, the number on file is out of order, no consent to communicate on file and no MyChart.     Contacted Kamille and she gave additional number ([phone]) to contact patient. Reached patient and scheduled infusion.    -Devika MARIE, CMA

## 2022-04-14 ENCOUNTER — INFUSION THERAPY VISIT (OUTPATIENT)
Dept: INFUSION THERAPY | Facility: CLINIC | Age: 27
End: 2022-04-14
Attending: MIDWIFE
Payer: COMMERCIAL

## 2022-04-14 VITALS — DIASTOLIC BLOOD PRESSURE: 78 MMHG | RESPIRATION RATE: 16 BRPM | HEART RATE: 87 BPM | SYSTOLIC BLOOD PRESSURE: 136 MMHG

## 2022-04-14 DIAGNOSIS — D50.8 IRON DEFICIENCY ANEMIA SECONDARY TO INADEQUATE DIETARY IRON INTAKE: Primary | ICD-10-CM

## 2022-04-14 DIAGNOSIS — O09.93 HRP (HIGH RISK PREGNANCY), THIRD TRIMESTER: ICD-10-CM

## 2022-04-14 PROCEDURE — 96365 THER/PROPH/DIAG IV INF INIT: CPT

## 2022-04-14 PROCEDURE — 258N000003 HC RX IP 258 OP 636: Performed by: MIDWIFE

## 2022-04-14 PROCEDURE — 250N000011 HC RX IP 250 OP 636: Performed by: MIDWIFE

## 2022-04-14 RX ORDER — EPINEPHRINE 1 MG/ML
0.3 INJECTION, SOLUTION, CONCENTRATE INTRAVENOUS EVERY 5 MIN PRN
Status: CANCELLED | OUTPATIENT
Start: 2022-04-21

## 2022-04-14 RX ORDER — NALOXONE HYDROCHLORIDE 0.4 MG/ML
0.2 INJECTION, SOLUTION INTRAMUSCULAR; INTRAVENOUS; SUBCUTANEOUS
Status: CANCELLED | OUTPATIENT
Start: 2022-04-21

## 2022-04-14 RX ORDER — ALBUTEROL SULFATE 90 UG/1
1-2 AEROSOL, METERED RESPIRATORY (INHALATION)
Status: CANCELLED
Start: 2022-04-21

## 2022-04-14 RX ORDER — MEPERIDINE HYDROCHLORIDE 25 MG/ML
25 INJECTION INTRAMUSCULAR; INTRAVENOUS; SUBCUTANEOUS EVERY 30 MIN PRN
Status: CANCELLED | OUTPATIENT
Start: 2022-04-21

## 2022-04-14 RX ORDER — DIPHENHYDRAMINE HYDROCHLORIDE 50 MG/ML
50 INJECTION INTRAMUSCULAR; INTRAVENOUS
Status: CANCELLED
Start: 2022-04-21

## 2022-04-14 RX ORDER — METHYLPREDNISOLONE SODIUM SUCCINATE 125 MG/2ML
125 INJECTION, POWDER, LYOPHILIZED, FOR SOLUTION INTRAMUSCULAR; INTRAVENOUS
Status: CANCELLED
Start: 2022-04-21

## 2022-04-14 RX ORDER — ALBUTEROL SULFATE 0.83 MG/ML
2.5 SOLUTION RESPIRATORY (INHALATION)
Status: CANCELLED | OUTPATIENT
Start: 2022-04-21

## 2022-04-14 RX ORDER — HEPARIN SODIUM,PORCINE 10 UNIT/ML
5 VIAL (ML) INTRAVENOUS
Status: CANCELLED | OUTPATIENT
Start: 2022-04-21

## 2022-04-14 RX ORDER — HEPARIN SODIUM (PORCINE) LOCK FLUSH IV SOLN 100 UNIT/ML 100 UNIT/ML
5 SOLUTION INTRAVENOUS
Status: CANCELLED | OUTPATIENT
Start: 2022-04-21

## 2022-04-14 RX ADMIN — FERRIC CARBOXYMALTOSE INJECTION 750 MG: 50 INJECTION, SOLUTION INTRAVENOUS at 08:54

## 2022-04-14 RX ADMIN — SODIUM CHLORIDE 250 ML: 9 INJECTION, SOLUTION INTRAVENOUS at 09:15

## 2022-04-14 NOTE — PROGRESS NOTES
Infusion Nursing Note:  Vandana Lagos presents today for scheduled Injectafer infusion.    Patient seen by provider today: No   present during visit today: Not Applicable.    Note: Patient arrived for scheduled Injectafer infusion. Patient was provided written education; does not have questions at this time.       Intravenous Access:  Peripheral IV placed in left forearm.    Treatment Conditions:  Not Applicable.      Post Infusion Assessment:  Patient tolerated infusion without incident.  Patient observed for 30 minutes post Injectafer per protocol.  Blood return noted pre and post infusion.  Site patent and intact, free from redness, edema or discomfort.  No evidence of extravasations.  Access discontinued per protocol.       Discharge Plan:   Discharge instructions reviewed with: Patient.  Patient and/or family verbalized understanding of discharge instructions and all questions answered.  Patient discharged in stable condition accompanied by: self.  Departure Mode: Ambulatory.      Jessica Elizabeth RN

## 2022-04-21 ENCOUNTER — INFUSION THERAPY VISIT (OUTPATIENT)
Dept: INFUSION THERAPY | Facility: CLINIC | Age: 27
End: 2022-04-21
Attending: MIDWIFE
Payer: COMMERCIAL

## 2022-04-21 ENCOUNTER — LAB REQUISITION (OUTPATIENT)
Dept: LAB | Facility: CLINIC | Age: 27
End: 2022-04-21
Payer: COMMERCIAL

## 2022-04-21 VITALS — HEART RATE: 94 BPM | SYSTOLIC BLOOD PRESSURE: 132 MMHG | DIASTOLIC BLOOD PRESSURE: 76 MMHG | OXYGEN SATURATION: 96 %

## 2022-04-21 DIAGNOSIS — D50.8 IRON DEFICIENCY ANEMIA SECONDARY TO INADEQUATE DIETARY IRON INTAKE: Primary | ICD-10-CM

## 2022-04-21 DIAGNOSIS — O09.93 HRP (HIGH RISK PREGNANCY), THIRD TRIMESTER: ICD-10-CM

## 2022-04-21 DIAGNOSIS — Z11.3 ENCOUNTER FOR SCREENING FOR INFECTIONS WITH A PREDOMINANTLY SEXUAL MODE OF TRANSMISSION: ICD-10-CM

## 2022-04-21 PROCEDURE — 87389 HIV-1 AG W/HIV-1&-2 AB AG IA: CPT | Performed by: INTERNAL MEDICINE

## 2022-04-21 PROCEDURE — 87491 CHLMYD TRACH DNA AMP PROBE: CPT | Mod: ORL | Performed by: INTERNAL MEDICINE

## 2022-04-21 PROCEDURE — 250N000011 HC RX IP 250 OP 636: Performed by: MIDWIFE

## 2022-04-21 PROCEDURE — 96365 THER/PROPH/DIAG IV INF INIT: CPT

## 2022-04-21 PROCEDURE — 258N000003 HC RX IP 258 OP 636: Performed by: MIDWIFE

## 2022-04-21 PROCEDURE — 87591 N.GONORRHOEAE DNA AMP PROB: CPT | Mod: ORL | Performed by: INTERNAL MEDICINE

## 2022-04-21 PROCEDURE — 86780 TREPONEMA PALLIDUM: CPT | Performed by: INTERNAL MEDICINE

## 2022-04-21 RX ORDER — METHYLPREDNISOLONE SODIUM SUCCINATE 125 MG/2ML
125 INJECTION, POWDER, LYOPHILIZED, FOR SOLUTION INTRAMUSCULAR; INTRAVENOUS
Status: CANCELLED
Start: 2022-04-21

## 2022-04-21 RX ORDER — MEPERIDINE HYDROCHLORIDE 25 MG/ML
25 INJECTION INTRAMUSCULAR; INTRAVENOUS; SUBCUTANEOUS EVERY 30 MIN PRN
Status: CANCELLED | OUTPATIENT
Start: 2022-04-21

## 2022-04-21 RX ORDER — HEPARIN SODIUM,PORCINE 10 UNIT/ML
5 VIAL (ML) INTRAVENOUS
Status: CANCELLED | OUTPATIENT
Start: 2022-04-21

## 2022-04-21 RX ORDER — HEPARIN SODIUM (PORCINE) LOCK FLUSH IV SOLN 100 UNIT/ML 100 UNIT/ML
5 SOLUTION INTRAVENOUS
Status: CANCELLED | OUTPATIENT
Start: 2022-04-21

## 2022-04-21 RX ORDER — DIPHENHYDRAMINE HYDROCHLORIDE 50 MG/ML
50 INJECTION INTRAMUSCULAR; INTRAVENOUS
Status: CANCELLED
Start: 2022-04-21

## 2022-04-21 RX ORDER — ALBUTEROL SULFATE 90 UG/1
1-2 AEROSOL, METERED RESPIRATORY (INHALATION)
Status: CANCELLED
Start: 2022-04-21

## 2022-04-21 RX ORDER — EPINEPHRINE 1 MG/ML
0.3 INJECTION, SOLUTION, CONCENTRATE INTRAVENOUS EVERY 5 MIN PRN
Status: CANCELLED | OUTPATIENT
Start: 2022-04-21

## 2022-04-21 RX ORDER — ALBUTEROL SULFATE 0.83 MG/ML
2.5 SOLUTION RESPIRATORY (INHALATION)
Status: CANCELLED | OUTPATIENT
Start: 2022-04-21

## 2022-04-21 RX ORDER — NALOXONE HYDROCHLORIDE 0.4 MG/ML
0.2 INJECTION, SOLUTION INTRAMUSCULAR; INTRAVENOUS; SUBCUTANEOUS
Status: CANCELLED | OUTPATIENT
Start: 2022-04-21

## 2022-04-21 RX ADMIN — FERRIC CARBOXYMALTOSE INJECTION 750 MG: 50 INJECTION, SOLUTION INTRAVENOUS at 11:49

## 2022-04-21 ASSESSMENT — PAIN SCALES - GENERAL: PAINLEVEL: NO PAIN (0)

## 2022-04-21 NOTE — PROGRESS NOTES
Infusion Nursing Note:  Vandana ROJAS Demond presents today for Injectafer.    Patient seen by provider today: No   present during visit today: Not Applicable.    Note: Dose 2/2 of injectafter.      Intravenous Access:  Peripheral IV placed.    Treatment Conditions:  Not Applicable.      Post Infusion Assessment:  Patient tolerated infusion without incident.  Patient observed for 30 minutes post injectafer per protocol.  Site patent and intact, free from redness, edema or discomfort.  No evidence of extravasations.  Access discontinued per protocol.       Discharge Plan:   Copy of AVS reviewed with patient and/or family.  Patient discharged in stable condition accompanied by: self.  Departure Mode: Ambulatory.      Lidia Dawson RN

## 2022-04-22 LAB
C TRACH DNA SPEC QL NAA+PROBE: NEGATIVE
HIV 1+2 AB+HIV1 P24 AG SERPL QL IA: NONREACTIVE
N GONORRHOEA DNA SPEC QL NAA+PROBE: NEGATIVE
T PALLIDUM AB SER QL: NONREACTIVE

## 2022-04-25 ENCOUNTER — HOSPITAL ENCOUNTER (OUTPATIENT)
Facility: CLINIC | Age: 27
Discharge: HOME | End: 2022-04-25
Attending: ADVANCED PRACTICE MIDWIFE | Admitting: ADVANCED PRACTICE MIDWIFE
Payer: COMMERCIAL

## 2022-04-25 ENCOUNTER — TRANSCRIBE ORDERS (OUTPATIENT)
Dept: MATERNAL FETAL MEDICINE | Facility: CLINIC | Age: 27
End: 2022-04-25

## 2022-04-25 VITALS — SYSTOLIC BLOOD PRESSURE: 146 MMHG | DIASTOLIC BLOOD PRESSURE: 84 MMHG | RESPIRATION RATE: 16 BRPM

## 2022-04-25 DIAGNOSIS — O13.3 GESTATIONAL HYPERTENSION, THIRD TRIMESTER: Primary | ICD-10-CM

## 2022-04-25 DIAGNOSIS — O26.90 PREGNANCY RELATED CONDITION, ANTEPARTUM: Primary | ICD-10-CM

## 2022-04-25 PROBLEM — O13.9 GESTATIONAL HYPERTENSION: Status: ACTIVE | Noted: 2022-04-25

## 2022-04-25 LAB
ALT SERPL W P-5'-P-CCNC: 18 U/L (ref 0–50)
AST SERPL W P-5'-P-CCNC: 19 U/L (ref 0–45)
CREAT SERPL-MCNC: 0.44 MG/DL (ref 0.52–1.04)
CREAT UR-MCNC: 116 MG/DL
ERYTHROCYTE [DISTWIDTH] IN BLOOD BY AUTOMATED COUNT: 16.4 % (ref 10–15)
GFR SERPL CREATININE-BSD FRML MDRD: >90 ML/MIN/1.73M2
HCT VFR BLD AUTO: 29.8 % (ref 35–47)
HGB BLD-MCNC: 9.6 G/DL (ref 11.7–15.7)
MCH RBC QN AUTO: 27.4 PG (ref 26.5–33)
MCHC RBC AUTO-ENTMCNC: 32.2 G/DL (ref 31.5–36.5)
MCV RBC AUTO: 85 FL (ref 78–100)
PLATELET # BLD AUTO: 158 10E3/UL (ref 150–450)
PROT UR-MCNC: 0.18 G/L
PROT/CREAT 24H UR: 0.16 G/G CR (ref 0–0.2)
RBC # BLD AUTO: 3.5 10E6/UL (ref 3.8–5.2)
URATE SERPL-MCNC: 3.3 MG/DL (ref 2.6–6)
WBC # BLD AUTO: 7.8 10E3/UL (ref 4–11)

## 2022-04-25 PROCEDURE — 84156 ASSAY OF PROTEIN URINE: CPT | Performed by: ADVANCED PRACTICE MIDWIFE

## 2022-04-25 PROCEDURE — 59025 FETAL NON-STRESS TEST: CPT | Mod: 26 | Performed by: ADVANCED PRACTICE MIDWIFE

## 2022-04-25 PROCEDURE — 250N000013 HC RX MED GY IP 250 OP 250 PS 637: Performed by: ADVANCED PRACTICE MIDWIFE

## 2022-04-25 PROCEDURE — 82565 ASSAY OF CREATININE: CPT | Performed by: ADVANCED PRACTICE MIDWIFE

## 2022-04-25 PROCEDURE — 36415 COLL VENOUS BLD VENIPUNCTURE: CPT | Performed by: ADVANCED PRACTICE MIDWIFE

## 2022-04-25 PROCEDURE — 84450 TRANSFERASE (AST) (SGOT): CPT | Performed by: ADVANCED PRACTICE MIDWIFE

## 2022-04-25 PROCEDURE — 99214 OFFICE O/P EST MOD 30 MIN: CPT | Mod: 25 | Performed by: ADVANCED PRACTICE MIDWIFE

## 2022-04-25 PROCEDURE — 84460 ALANINE AMINO (ALT) (SGPT): CPT | Performed by: ADVANCED PRACTICE MIDWIFE

## 2022-04-25 PROCEDURE — 84550 ASSAY OF BLOOD/URIC ACID: CPT | Performed by: ADVANCED PRACTICE MIDWIFE

## 2022-04-25 PROCEDURE — 85027 COMPLETE CBC AUTOMATED: CPT | Performed by: ADVANCED PRACTICE MIDWIFE

## 2022-04-25 PROCEDURE — G0463 HOSPITAL OUTPT CLINIC VISIT: HCPCS | Mod: 25

## 2022-04-25 PROCEDURE — 59025 FETAL NON-STRESS TEST: CPT

## 2022-04-25 RX ORDER — PROCHLORPERAZINE MALEATE 10 MG
10 TABLET ORAL EVERY 6 HOURS PRN
Status: DISCONTINUED | OUTPATIENT
Start: 2022-04-25 | End: 2022-04-25 | Stop reason: HOSPADM

## 2022-04-25 RX ORDER — ONDANSETRON 4 MG/1
4 TABLET, ORALLY DISINTEGRATING ORAL EVERY 6 HOURS PRN
Status: DISCONTINUED | OUTPATIENT
Start: 2022-04-25 | End: 2022-04-25 | Stop reason: HOSPADM

## 2022-04-25 RX ORDER — PROCHLORPERAZINE 25 MG
25 SUPPOSITORY, RECTAL RECTAL EVERY 12 HOURS PRN
Status: DISCONTINUED | OUTPATIENT
Start: 2022-04-25 | End: 2022-04-25 | Stop reason: HOSPADM

## 2022-04-25 RX ORDER — ONDANSETRON 2 MG/ML
4 INJECTION INTRAMUSCULAR; INTRAVENOUS EVERY 6 HOURS PRN
Status: DISCONTINUED | OUTPATIENT
Start: 2022-04-25 | End: 2022-04-25 | Stop reason: HOSPADM

## 2022-04-25 RX ORDER — METOCLOPRAMIDE HYDROCHLORIDE 5 MG/ML
10 INJECTION INTRAMUSCULAR; INTRAVENOUS EVERY 6 HOURS PRN
Status: DISCONTINUED | OUTPATIENT
Start: 2022-04-25 | End: 2022-04-25 | Stop reason: HOSPADM

## 2022-04-25 RX ORDER — METOCLOPRAMIDE 10 MG/1
10 TABLET ORAL EVERY 6 HOURS PRN
Status: DISCONTINUED | OUTPATIENT
Start: 2022-04-25 | End: 2022-04-25 | Stop reason: HOSPADM

## 2022-04-25 RX ORDER — ACETAMINOPHEN 325 MG/1
975 TABLET ORAL EVERY 6 HOURS PRN
Status: DISCONTINUED | OUTPATIENT
Start: 2022-04-25 | End: 2022-04-25 | Stop reason: HOSPADM

## 2022-04-25 RX ADMIN — ACETAMINOPHEN 975 MG: 325 TABLET ORAL at 12:22

## 2022-04-25 NOTE — DISCHARGE INSTRUCTIONS
Discharge Instruction for Undelivered Patients      You were seen for:  Pre-eclampsia assessment  We Consulted: Daniel Haynes CNM  You had (Test or Medicine): labs and monitoring     Diet:   Drink 8 to 12 glasses of liquids (milk, juice, water) every day.  You may eat meals and snacks.     Activity:  Count fetal kicks everyday (see handout)  Call your doctor or nurse midwife if your baby is moving less than usual.     Call your provider if you notice:  Swelling in your face or increased swelling in your hands or legs.  Headaches that are not relieved by Tylenol (acetaminophen).  Changes in your vision (blurring: seeing spots or stars.)  Nausea (sick to your stomach) and vomiting (throwing up).   Weight gain of 5 pounds or more per week.  Heartburn that doesn't go away.  Signs of bladder infection: pain when you urinate (use the toilet), need to go more often and more urgently.  The bag of julien (rupture of membranes) breaks, or you notice leaking in your underwear.  Bright red blood in your underwear.  Abdominal (lower belly) or stomach pain.  Second (plus) baby: Contractions (tightening) less than 10 minutes apart and getting stronger.  *If less than 34 weeks: Contractions (tightening) more than 6 times in one hour.  Increase or change in vaginal discharge (note the color and amount)    Follow-up:  As scheduled in the clinic

## 2022-04-25 NOTE — DISCHARGE SUMMARY
Data: [facility]   Reason for maternal/fetal assessment per patient is Rule Out Pre-eclampsia  . Patient is a . Prenatal record reviewed.      OB History    Para Term  AB Living   4 2 2 0 1 2   SAB IAB Ectopic Multiple Live Births   0 0 0 0 2      # Outcome Date GA Lbr Nikolai/2nd Weight Sex Delivery Anes PTL Lv   4 Current            3 Term 18 40w3d 06:47 / 00:16 3.771 kg (8 lb 5 oz) F Vag-Spont EPI N TENA      Complications: Preeclampsia/Hypertension      Name: ALDO CALDERON      Apgar1: 8  Apgar5: 9   2 Term 17 40w1d 04:35 / 00:33 3.6 kg (7 lb 15 oz) M Vag-Spont EPI  TENA      Complications: GBS, Preeclampsia/Hypertension      Name: ALDO CALDERON      Apgar1: 8  Apgar5: 9   1 AB      AB, COMPLETE         Medical History:   Past Medical History:   Diagnosis Date     Iron deficiency anemia secondary to inadequate dietary iron intake 2022     Preeclampsia 2017     Uncomplicated asthma    . Gestational Age 31w4d. VSS. Cervix: not examined.  Fetal movement present. Patient denies cramping, backache, vaginal discharge, pelvic pressure, UTI symptoms, GI problems, bloody show, vaginal bleeding, edema visual disturbances, epigastric or URQ pain, abdominal pain, rupture of membranes. Support persons are present.  Action: Verbal consent for EFM. Triage assessment completed. EFM and [place] applied for fetal and uterine assessment. Fetal assessment: Presumed adequate fetal oxygenation documented (see flow record). Labs obtained and WDL. Patient education pamphlets given on fetal movement counts and gestational hypertension. Patient instructed to report change in fetal movement, vaginal leaking of fluid or bleeding, abdominal pain, or any concerns related to the pregnancy to her nurse/physician.   Response: HUNTER Haynes informed of arrival. Plan per provider is discharge to home. Patient verbalized understanding of education and verbalized  agreement with plan. Discharged ambulatory at 1405.

## 2022-04-25 NOTE — PROGRESS NOTES
HOSPITAL TRIAGE NOTE  ===================    CHIEF COMPLAINT  ========================  Vandana Lagos is a 26 year old patient presenting today at 31w4d for evaluation of hypertension disorder of pregnancy.    Patient's last menstrual period was 2021.  Estimated Date of Delivery: 2022     HPI  ==================   [facility] pt.  Sent from clinic for a mild headache and elevated BP. On 22, she had an elevated BP of 144/ 83 at PCP visit and again today in clinic with BP of 137/90. She reports a mild headache that began this morning, has improved throughout the day. Denies severe headaches, visual changes or RUQ/epigastric pain.  Denies vaginal bleeding, abnormal discharge, or leakage of fluid.  Has hx of GHTN, as well as pre-eclampsia.  Has growth US scheduled for next week d/t Covid in pregnancy.    Prenatal record and labs reviewed from [facility], through Care Everywhere.    CONTRACTIONS: none  ABDOMINAL PAIN: none  FETAL MOVEMENT: active    VAGINAL BLEEDING: none  RUPTURE OF MEMBRANES: no  PELVIC PAIN: none    PREGNANCY COMPLICATIONS: Hx of gestational HTN and preeclampsia.   Other: Covid in pregnancy, anemia (s/p IV iron infusions)    REVIEW OF SYSTEMS  =====================  C: NEGATIVE for fever, chills  I: NEGATIVE for worrisome rashes, moles or lesions  E: NEGATIVE for vision changes or irritation  R: NEGATIVE for significant cough or SOB  CV: NEGATIVE for chest pain, palpitations or varicosities  GI: NEGATIVE for nausea, abdominal pain, heartburn, or change in bowel habits  : NEGATIVE for frequency, dysuria, or hematuria  M: NEGATIVE for significant arthralgias or myalgia  N: POSITIVE for mild headache. NEGATIVE for severe headaches, weakness, dizziness or paresthesias  P: NEGATIVE for changes in mood or affect    PROBLEM LIST  ===============  Patient Active Problem List    Diagnosis Date Noted     Gestational hypertension 2022     Priority: Medium     Iron deficiency anemia  secondary to inadequate dietary iron intake 2022     Priority: Medium     HRP (high risk pregnancy), third trimester 2022     Priority: Medium     Vitamin D deficiency 2021     Priority: Medium     21- Vit D 7- start 4000IU every day___       Labor and delivery, indication for care 2018     Priority: Medium     Labor and delivery indication for care or intervention 2017     Priority: Medium     HISTORIES  ==============  ALLERGIES:    No Known Allergies  PAST MEDICAL HISTORY  Past Medical History:   Diagnosis Date     Iron deficiency anemia secondary to inadequate dietary iron intake 2022     Preeclampsia 2017     Uncomplicated asthma      SOCIAL HISTORY  Social History     Socioeconomic History     Marital status: Single     Spouse name: Not on file     Number of children: Not on file     Years of education: Not on file     Highest education level: Not on file   Occupational History     Not on file   Tobacco Use     Smoking status: Former Smoker     Quit date: 2017     Years since quittin.2     Smokeless tobacco: Never Used   Substance and Sexual Activity     Alcohol use: No     Drug use: No     Sexual activity: Yes     Partners: Male   Other Topics Concern     Not on file   Social History Narrative     Not on file     Social Determinants of Health     Financial Resource Strain: Not on file   Food Insecurity: Not on file   Transportation Needs: Not on file   Physical Activity: Not on file   Stress: Not on file   Social Connections: Not on file   Intimate Partner Violence: Not on file   Housing Stability: Not on file     PARTNER: Thierno   FAMILY HISTORY  No family history on file.  OB HISTORY  OB History    Para Term  AB Living   4 2 2 0 1 2   SAB IAB Ectopic Multiple Live Births   0 0 0 0 2      # Outcome Date GA Lbr Nikolai/2nd Weight Sex Delivery Anes PTL Lv   4 Current            3 Term 18 40w3d 06:47 / 00:16 3.771 kg (8 lb 5 oz) F Vag-Spont  EPI N TENA      Complications: Preeclampsia/Hypertension      Name: ALDO CALDERON      Apgar1: 8  Apgar5: 9   2 Term 02/21/17 40w1d 04:35 / 00:33 3.6 kg (7 lb 15 oz) M Vag-Spont EPI  TENA      Complications: GBS, Preeclampsia/Hypertension      Name: ALDO CALDERON      Apgar1: 8  Apgar5: 9   1 AB      AB, COMPLETE        Prenatal Labs:   Lab Results   Component Value Date    ABO O 06/25/2021    RH Neg 06/25/2021    AS Neg 06/25/2021    HEPBANG Nonreactive 11/12/2021    TREPAB Negative 02/20/2017    RUQIGG Positive (A) 11/12/2021    HGB 9.6 (L) 04/25/2022     Rubella- immune  ULTRASOUND(s) reviewed: yes, WNL on 1/25/22.    EXAM  ============  BP (!) 146/84   Resp 16   LMP 09/16/2021   GENERAL APPEARANCE: healthy, alert and no distress  RESP: lungs clear to auscultation - no rales, rhonchi or wheezes  CV: regular rates and rhythm, normal S1 S2, no S3 or S4 and no murmur,and no varicosities  ABDOMEN:  soft, nontender, no epigastric pain  SKIN: no suspicious lesions or rashes  NEURO: Denies headache, blurred vision, other vision changes  PSYCH: mentation appears normal. and affect normal/bright  MS/ LEGS: Reflexes normal bilaterally    CONTRACTIONS: none   FETAL HEART TONES: continuous EFM- baseline 130 with moderate variability and positive accelerations. No decelerations.  NST: REACTIVE    PELVIC EXAM: deferred  BLOOD: no  DISCHARGE: none    ROM: no  ROMPLUS: not done    LABS: HELLP labs- ALT, AST, creatinine, uric acid, CBC with platelets, Protein/Creatinine Ratio.   Lab results reviewed- Hgb 9.6 (improved), otherwise labs WNL    DIAGNOSIS  ============  31w4d seen on the [facility] Triage for hypertension evaluation  Hx of preeclampsia and HTN   NST: REACTIVE  Fetal Heart rate tracing:category one  Headache, improved    Patient Active Problem List   Diagnosis     Labor and delivery indication for care or intervention     Labor and delivery, indication for care     Vitamin D deficiency     Iron  deficiency anemia secondary to inadequate dietary iron intake     HRP (high risk pregnancy), third trimester     Gestational hypertension     PLAN  ============  Call or return to the Birthplace with contractions, cramping, abdominal or pelvic pain, vaginal bleeding, leaking fluid or decreased fetal movement.  Discussed criteria and diagnosis of GHTN,  surveillance and plan for IOL at 37 weeks. MFM order placed.  Plan to continue to take BPs at home, will order home BP cuff   Follow up at your next clinic visit- 2022    Fetal Non-Stress Test Results    NST Ordered By: JAQUI Torres CNM  NST Medical Indication: R/O Pre-e    NST Start & Stop Times  NST Start Time: 1140  NST Stop Time: 1200    NST Results  Fetus A   Baseline Rate: 130  Accelerations: Present  Decelerations: None  Interpretation: reactive    I, MAREN Iglesias, am serving as a scribe to document services personally performed by CNM based on the provider's statements to me.- MAREN Iglesias      I agree with the PFSH and ROS as completed by the student, except for changes made by me. The remainder of the encounter was performed by me and scribed by the student. The scribed note accurately reflects my personal services and decisions made by me.  JAQUI Torres CNM

## 2022-04-26 ENCOUNTER — TELEPHONE (OUTPATIENT)
Dept: OBGYN | Facility: CLINIC | Age: 27
End: 2022-04-26
Payer: COMMERCIAL

## 2022-04-26 ENCOUNTER — HOSPITAL ENCOUNTER (OUTPATIENT)
Facility: CLINIC | Age: 27
Discharge: HOME | End: 2022-04-26
Attending: OBSTETRICS & GYNECOLOGY | Admitting: REGISTERED NURSE
Payer: COMMERCIAL

## 2022-04-26 VITALS — TEMPERATURE: 98.6 F | RESPIRATION RATE: 16 BRPM

## 2022-04-26 PROBLEM — N93.9 VAGINAL BLEEDING: Status: ACTIVE | Noted: 2022-04-26

## 2022-04-26 LAB
FERRITIN SERPL-MCNC: 549 NG/ML (ref 12–150)
IRON SATN MFR SERPL: 20 % (ref 15–46)
IRON SERPL-MCNC: 72 UG/DL (ref 35–180)
TIBC SERPL-MCNC: 356 UG/DL (ref 240–430)

## 2022-04-26 PROCEDURE — 82728 ASSAY OF FERRITIN: CPT | Performed by: REGISTERED NURSE

## 2022-04-26 PROCEDURE — G0463 HOSPITAL OUTPT CLINIC VISIT: HCPCS | Mod: 25

## 2022-04-26 PROCEDURE — 83550 IRON BINDING TEST: CPT | Performed by: REGISTERED NURSE

## 2022-04-26 PROCEDURE — 36415 COLL VENOUS BLD VENIPUNCTURE: CPT | Performed by: REGISTERED NURSE

## 2022-04-26 PROCEDURE — 59025 FETAL NON-STRESS TEST: CPT

## 2022-04-26 RX ORDER — METOCLOPRAMIDE 10 MG/1
10 TABLET ORAL EVERY 6 HOURS PRN
Status: DISCONTINUED | OUTPATIENT
Start: 2022-04-26 | End: 2022-04-26 | Stop reason: HOSPADM

## 2022-04-26 RX ORDER — ONDANSETRON 2 MG/ML
4 INJECTION INTRAMUSCULAR; INTRAVENOUS EVERY 6 HOURS PRN
Status: DISCONTINUED | OUTPATIENT
Start: 2022-04-26 | End: 2022-04-26 | Stop reason: HOSPADM

## 2022-04-26 RX ORDER — ONDANSETRON 4 MG/1
4 TABLET, ORALLY DISINTEGRATING ORAL EVERY 6 HOURS PRN
Status: DISCONTINUED | OUTPATIENT
Start: 2022-04-26 | End: 2022-04-26 | Stop reason: HOSPADM

## 2022-04-26 RX ORDER — PROCHLORPERAZINE 25 MG
25 SUPPOSITORY, RECTAL RECTAL EVERY 12 HOURS PRN
Status: DISCONTINUED | OUTPATIENT
Start: 2022-04-26 | End: 2022-04-26 | Stop reason: HOSPADM

## 2022-04-26 RX ORDER — PROCHLORPERAZINE MALEATE 10 MG
10 TABLET ORAL EVERY 6 HOURS PRN
Status: DISCONTINUED | OUTPATIENT
Start: 2022-04-26 | End: 2022-04-26 | Stop reason: HOSPADM

## 2022-04-26 RX ORDER — METOCLOPRAMIDE HYDROCHLORIDE 5 MG/ML
10 INJECTION INTRAMUSCULAR; INTRAVENOUS EVERY 6 HOURS PRN
Status: DISCONTINUED | OUTPATIENT
Start: 2022-04-26 | End: 2022-04-26 | Stop reason: HOSPADM

## 2022-04-26 NOTE — TELEPHONE ENCOUNTER
Vandana is a  @ 31w5d who called regarding concern for bleeding after intercourse. She states that when she wiped and tissue was bloody and there was blood in the toilet. She has bled after intercourse before, but notes this was more than usual. She then got into the shower to clean up and when she got out she still noticed bleeding when she wiped and some in the toilet. She felt some cramping before intercourse but denies any cramping or abdominal pain at this time.     Reports she has not felt fetal movement since she woke up at 0830. She has not eaten anything yet today. Has not attempted kick counts.     Advised she should come in for evaluation. She states she has to bring her children to school but then will present to triage. Encouraged her to eat breakfast and drink water. She states understanding.     JAQUI Rubio CNM

## 2022-04-26 NOTE — PROGRESS NOTES
Data: Patient [facility] the [facility] at 1136.   Reason for maternal/fetal assessment per patient is Vaginal Bleeding  . Patient is a . Prenatal record reviewed.      OB History    Para Term  AB Living   4 2 2 0 1 2   SAB IAB Ectopic Multiple Live Births   0 0 0 0 2      # Outcome Date GA Lbr Nikolai/2nd Weight Sex Delivery Anes PTL Lv   4 Current            3 Term 18 40w3d 06:47 / 00:16 3.771 kg (8 lb 5 oz) F Vag-Spont EPI N TENA      Complications: Preeclampsia/Hypertension, Shoulder Dystocia      Name: ALDO CALDERON      Apgar1: 8  Apgar5: 9   2 Term 17 40w1d 04:35 / 00:33 3.6 kg (7 lb 15 oz) M Vag-Spont EPI  TENA      Complications: GBS, Preeclampsia/Hypertension      Name: ALDO CALDERON      Apgar1: 8  Apgar5: 9   1 AB      AB, COMPLETE         Medical History:   Past Medical History:   Diagnosis Date     Iron deficiency anemia secondary to inadequate dietary iron intake 2022     Preeclampsia 2017     Uncomplicated asthma    . Gestational Age 31w5d. VSS. Cervix: closed.  Fetal movement present. Patient denies cramping, backache, pelvic pressure, UTI symptoms, GI problems, edema, headache, visual disturbances, epigastric or URQ pain, abdominal pain, rupture of membranes.  Complains of bright red vaginal bleeding during/after intercourse between 9-10 am today.  She has only noticed a small amount since.  She also felt decreased fetal movement earlier today, though she now reports normal movement.  Action: Verbal consent for EFM. Triage assessment completed. EFM applied. Uterine assessment per toco. Fetal assessment: Presumed adequate fetal oxygenation documented (see flow record). Patient instructed to report change in fetal movement, vaginal leaking of fluid or bleeding, abdominal pain, or any concerns related to the pregnancy to her nurse/physician.   Response: A Skalisky CNM informed of patient arrival and complaints. CNM to bedside for SSE.  Labs  done.  NST reactive.  Plan per provider is discharge to home. Patient verbalized understanding of education and verbalized agreement with plan. Discharged ambulatory at 1400.

## 2022-04-26 NOTE — DISCHARGE INSTRUCTIONS
Discharge Instruction for Undelivered Patients      You were seen for: Bleeding Assessment  We Consulted: Ilia MOROCHOM  You had (Test or Medicine):Fetal monitoring, labs, speculum exam.     Diet:   Drink 8 to 12 glasses of liquids (milk, juice, water) every day.  You may eat meals and snacks.     Activity:  Call your doctor or nurse midwife if your baby is moving less than usual.     Call your provider if you notice:  Swelling in your face or increased swelling in your hands or legs.  Headaches that are not relieved by Tylenol (acetaminophen).  Changes in your vision (blurring: seeing spots or stars.)  Nausea (sick to your stomach) and vomiting (throwing up).   Weight gain of 5 pounds or more per week.  Heartburn that doesn't go away.  Signs of bladder infection: pain when you urinate (use the toilet), need to go more often and more urgently.  The bag of julien (rupture of membranes) breaks, or you notice leaking in your underwear.  Bright red blood in your underwear.  Abdominal (lower belly) or stomach pain.  For first baby: Contractions (tightening) less than 5 minutes apart for one hour or more.  Second (plus) baby: Contractions (tightening) less than 10 minutes apart and getting stronger.  *If less than 34 weeks: Contractions (tightening) more than 6 times in one hour.  Increase or change in vaginal discharge (note the color and amount)      Follow-up:  As scheduled in the clinic

## 2022-04-26 NOTE — PROGRESS NOTES
"HOSPITAL TRIAGE NOTE  ===================    CHIEF COMPLAINT  ========================  Vandana BOB Lagos is a 26 year old patient presenting today at 31w5d for evaluation of vaginal bleeding.    Patient's last menstrual period was 2021.  Estimated Date of Delivery: 2022       HPI  ==================   Vandana called this morning after experiencing bleeding after intercourse. (see telephone encounter for further details). She is not currently wearing a pad and states it feels \"moist\". She has been feeling regular movement for the last few hours. She was also evaluated yesterday in triage r/o preeclampsia. Her HELLP labs were all normal, but she did receive a diagnosis of gestational hypertension.     Denies fever, cough, SOB or chest pain. Denies having contact with anyone who is Covid-19 positive. Pt has not had Covid-19 Vaccinations. She is covid recovered.   Prenatal record and labs reviewed from [facility], through [facility].    CONTRACTIONS: none  ABDOMINAL PAIN: none  FETAL MOVEMENT: active    VAGINAL BLEEDING: small, red bleeding after intercourse. See above.   RUPTURE OF MEMBRANES: no  PELVIC PAIN: none    PREGNANCY COMPLICATIONS:   - Gestational hypertension  - Anemia (9.6)   - hx asthma     OTHER:   Hx covid in pregnancy   Rh negative     # Pain Assessment:  Current Pain Score 2022   Patient currently in pain? yes   Pain location -   Pain descriptors -   Vandana benton pain level was assessed and she currently denies pain.        REVIEW OF SYSTEMS  =====================  C: NEGATIVE for fever, chills  I: NEGATIVE for worrisome rashes, moles or lesions  E: NEGATIVE for vision changes or irritation  R: NEGATIVE for significant cough or SOB  CV: NEGATIVE for chest pain, palpitations or varicosities  GI: NEGATIVE for nausea, abdominal pain, heartburn, or change in bowel habits  : NEGATIVE for frequency, dysuria, or hematuria  M: NEGATIVE for significant arthralgias or myalgia  N: " NEGATIVE for headache, weakness, dizziness or paresthesias  P: NEGATIVE for changes in mood or affect    PROBLEM LIST  ===============  Patient Active Problem List    Diagnosis Date Noted     Gestational hypertension 2022     Priority: Medium     Iron deficiency anemia secondary to inadequate dietary iron intake 2022     Priority: Medium     HRP (high risk pregnancy), third trimester 2022     Priority: Medium     Vitamin D deficiency 2021     Priority: Medium     21- Vit D 7- start 4000IU every day___       Labor and delivery, indication for care 2018     Priority: Medium     Labor and delivery indication for care or intervention 2017     Priority: Medium       HISTORIES  ==============  ALLERGIES:    No Known Allergies  PAST MEDICAL HISTORY  Past Medical History:   Diagnosis Date     Iron deficiency anemia secondary to inadequate dietary iron intake 2022     Preeclampsia 2017     Uncomplicated asthma      SOCIAL HISTORY  Social History     Socioeconomic History     Marital status: Single     Spouse name: Not on file     Number of children: Not on file     Years of education: Not on file     Highest education level: Not on file   Occupational History     Not on file   Tobacco Use     Smoking status: Former Smoker     Quit date: 2017     Years since quittin.2     Smokeless tobacco: Never Used   Substance and Sexual Activity     Alcohol use: No     Drug use: No     Sexual activity: Yes     Partners: Male   Other Topics Concern     Not on file   Social History Narrative     Not on file     Social Determinants of Health     Financial Resource Strain: Not on file   Food Insecurity: Not on file   Transportation Needs: Not on file   Physical Activity: Not on file   Stress: Not on file   Social Connections: Not on file   Intimate Partner Violence: Not on file   Housing Stability: Not on file     FAMILY HISTORY  No family history on file.  OB HISTORY  OB History     Para Term  AB Living   4 2 2 0 1 2   SAB IAB Ectopic Multiple Live Births   0 0 0 0 2      # Outcome Date GA Lbr Nikolai/2nd Weight Sex Delivery Anes PTL Lv   4 Current            3 Term 18 40w3d 06:47 / 00:16 3.771 kg (8 lb 5 oz) F Vag-Spont EPI N TENA      Complications: Preeclampsia/Hypertension      Name: ALDO CALDERON      Apgar1: 8  Apgar5: 9   2 Term 17 40w1d 04:35 / 00:33 3.6 kg (7 lb 15 oz) M Vag-Spont EPI  TENA      Complications: GBS, Preeclampsia/Hypertension      Name: ALDO CALDERON      Apgar1: 8  Apgar5: 9   1 AB      AB, COMPLETE        Prenatal Labs:   Lab Results   Component Value Date    ABO O 2021    RH Neg 2021    AS Neg 2021    RUQIGG Positive (A) 2021    HEPBANG Nonreactive 2021    TREPAB Negative 2017    HGB 9.6 (L) 2022    HIAGAB Nonreactive 2022    GLU1 79 2016     Rubella- immune    ULTRASOUND(s) reviewed: 22    EXAM  ============  Temp 98.6  F (37  C) (Oral)   Resp 16   LMP 2021   GENERAL APPEARANCE: healthy, alert and no distress  RESP: lungs clear to auscultation - no rales, rhonchi or wheezes  CV: regular rates and rhythm, normal S1 S2, no S3 or S4 and no murmur,and no varicosities  ABDOMEN:  soft, nontender, no epigastric pain  SKIN: no suspicious lesions or rashes  NEURO: Denies headache, blurred vision, other vision changes  PSYCH: mentation appears normal. and affect normal/bright  MS/ LEGS: No edema    CONTRACTIONS: none   FETAL HEART TONES: continuous EFM- baseline 125 with moderate variability and positive accelerations. 1 possible variable deceleration noted at ~1230. Extended monitoring Category I when traced (intermittent broken tracing due to maternal movement). No further decelerations noted.   NST: REACTIVE  EFW: n/a     PELVIC EXAM: appears closed on sterile speculum exam. No digital exam performed.   HAMILTON SCORE: n/a  PRESENTATION: VERTEX  BLOOD:  no  DISCHARGE: scant amount of brown discharge in vaginal vault. No mary bleeding.     ROM: no  ROMPLUS: not done    LABS: TIBC, ferritin  Lab results reviewed- HELLP labs 4/25, hgb ELP 1/7/2018. 7/14/2016  DIAGNOSIS  ============  31w5d [facility] the [facility] Triage for vaginal bleeding- resolved  Anemia s/p IV iron x2/2 doses  NST: REACTIVE  Fetal Heart Tones: category one  Patient Active Problem List   Diagnosis     Labor and delivery indication for care or intervention     Labor and delivery, indication for care     Vitamin D deficiency     Iron deficiency anemia secondary to inadequate dietary iron intake     HRP (high risk pregnancy), third trimester     Gestational hypertension       PLAN  ============  Labs reviewed elevated ferritin level likely related to recent iron infusions. Normal iron level and iron binding capacity. Plan recheck of hemoglobin 1 month s/p IV iron.   Call or return to the [facility] with contractions, cramping, abdominal or pelvic pain, vaginal bleeding, leaking fluid or decreased fetal movement.  Follow up as scheduled for clinic appointment on Monday  JAQUI Rubio CNM

## 2022-05-02 ENCOUNTER — HOSPITAL ENCOUNTER (OUTPATIENT)
Dept: ULTRASOUND IMAGING | Facility: CLINIC | Age: 27
Discharge: HOME | End: 2022-05-02
Attending: OBSTETRICS & GYNECOLOGY
Payer: COMMERCIAL

## 2022-05-02 ENCOUNTER — OFFICE VISIT (OUTPATIENT)
Dept: MATERNAL FETAL MEDICINE | Facility: CLINIC | Age: 27
End: 2022-05-02
Attending: OBSTETRICS & GYNECOLOGY
Payer: COMMERCIAL

## 2022-05-02 DIAGNOSIS — O13.3 GESTATIONAL HYPERTENSION, THIRD TRIMESTER: Primary | ICD-10-CM

## 2022-05-02 DIAGNOSIS — U07.1 COVID-19 AFFECTING PREGNANCY, ANTEPARTUM: ICD-10-CM

## 2022-05-02 DIAGNOSIS — O98.519 COVID-19 AFFECTING PREGNANCY, ANTEPARTUM: ICD-10-CM

## 2022-05-02 DIAGNOSIS — O98.513 COVID-19 AFFECTING PREGNANCY IN THIRD TRIMESTER: ICD-10-CM

## 2022-05-02 DIAGNOSIS — U07.1 COVID-19 AFFECTING PREGNANCY IN THIRD TRIMESTER: ICD-10-CM

## 2022-05-02 PROCEDURE — 76816 OB US FOLLOW-UP PER FETUS: CPT

## 2022-05-02 PROCEDURE — 76816 OB US FOLLOW-UP PER FETUS: CPT | Mod: 26 | Performed by: OBSTETRICS & GYNECOLOGY

## 2022-05-02 PROCEDURE — 76819 FETAL BIOPHYS PROFIL W/O NST: CPT | Mod: 26 | Performed by: OBSTETRICS & GYNECOLOGY

## 2022-05-02 PROCEDURE — 76819 FETAL BIOPHYS PROFIL W/O NST: CPT

## 2022-05-02 NOTE — PROGRESS NOTES
"Please see \"Imaging\" tab under \"Chart Review\" for details of today's US at the [facility].    Jose Alejandro Mendoza MD  Maternal-Fetal Medicine      "

## 2022-05-03 PROBLEM — O36.8390 FETAL ARRHYTHMIA AFFECTING PREGNANCY, ANTEPARTUM: Status: ACTIVE | Noted: 2022-05-03

## 2022-05-05 ENCOUNTER — OFFICE VISIT (OUTPATIENT)
Dept: MATERNAL FETAL MEDICINE | Facility: CLINIC | Age: 27
End: 2022-05-05
Attending: OBSTETRICS & GYNECOLOGY
Payer: COMMERCIAL

## 2022-05-05 ENCOUNTER — HOSPITAL ENCOUNTER (OUTPATIENT)
Dept: ULTRASOUND IMAGING | Facility: CLINIC | Age: 27
Discharge: HOME | End: 2022-05-05
Attending: OBSTETRICS & GYNECOLOGY
Payer: COMMERCIAL

## 2022-05-05 DIAGNOSIS — O13.3 GESTATIONAL HYPERTENSION, THIRD TRIMESTER: ICD-10-CM

## 2022-05-05 DIAGNOSIS — O13.3 GESTATIONAL HYPERTENSION, THIRD TRIMESTER: Primary | ICD-10-CM

## 2022-05-05 PROCEDURE — 76819 FETAL BIOPHYS PROFIL W/O NST: CPT

## 2022-05-05 PROCEDURE — 76819 FETAL BIOPHYS PROFIL W/O NST: CPT | Mod: 26 | Performed by: OBSTETRICS & GYNECOLOGY

## 2022-05-05 NOTE — PROGRESS NOTES
Vandana Demond was seen for an ultrasound today at the Maternal-Fetal Medicine center.      For the details of the ultrasound please see the report which can be found under the imaging tab.      Shakira Knight MD  , OB/GYN  Maternal-Fetal Medicine  [email or web address]  [phone] (Main M Office)  [phone] or [phone] (for 24 hour MFM questions)  [phone] (Pager)

## 2022-05-10 ENCOUNTER — HOSPITAL ENCOUNTER (OUTPATIENT)
Dept: ULTRASOUND IMAGING | Facility: CLINIC | Age: 27
Discharge: HOME | End: 2022-05-10
Attending: OBSTETRICS & GYNECOLOGY
Payer: COMMERCIAL

## 2022-05-10 ENCOUNTER — OFFICE VISIT (OUTPATIENT)
Dept: MATERNAL FETAL MEDICINE | Facility: CLINIC | Age: 27
End: 2022-05-10
Attending: OBSTETRICS & GYNECOLOGY
Payer: COMMERCIAL

## 2022-05-10 DIAGNOSIS — O13.3 GESTATIONAL HYPERTENSION, THIRD TRIMESTER: Primary | ICD-10-CM

## 2022-05-10 DIAGNOSIS — O13.3 GESTATIONAL HYPERTENSION, THIRD TRIMESTER: ICD-10-CM

## 2022-05-10 PROCEDURE — 76819 FETAL BIOPHYS PROFIL W/O NST: CPT

## 2022-05-10 PROCEDURE — 76819 FETAL BIOPHYS PROFIL W/O NST: CPT | Mod: 26 | Performed by: OBSTETRICS & GYNECOLOGY

## 2022-05-11 NOTE — PROGRESS NOTES
"Please see \"Imaging\" tab under \"Chart Review\" for details of today's US.    Roberta Bermeo, DO    "

## 2022-05-20 ENCOUNTER — LAB REQUISITION (OUTPATIENT)
Dept: LAB | Facility: CLINIC | Age: 27
End: 2022-05-20
Payer: COMMERCIAL

## 2022-05-20 DIAGNOSIS — O09.91 SUPERVISION OF HIGH RISK PREGNANCY, UNSPECIFIED, FIRST TRIMESTER: ICD-10-CM

## 2022-05-20 LAB
ALT SERPL W P-5'-P-CCNC: 22 U/L (ref 0–50)
AST SERPL W P-5'-P-CCNC: 16 U/L (ref 0–45)
CREAT SERPL-MCNC: 0.46 MG/DL (ref 0.52–1.04)
CREAT UR-MCNC: 98 MG/DL
GFR SERPL CREATININE-BSD FRML MDRD: >90 ML/MIN/1.73M2
PROT UR-MCNC: 0.15 G/L
PROT/CREAT 24H UR: 0.15 G/G CR (ref 0–0.2)
URATE SERPL-MCNC: 3.7 MG/DL (ref 2.6–6)

## 2022-05-20 PROCEDURE — 82565 ASSAY OF CREATININE: CPT | Mod: ORL | Performed by: ADVANCED PRACTICE MIDWIFE

## 2022-05-20 PROCEDURE — 84460 ALANINE AMINO (ALT) (SGPT): CPT | Mod: ORL | Performed by: ADVANCED PRACTICE MIDWIFE

## 2022-05-20 PROCEDURE — 84156 ASSAY OF PROTEIN URINE: CPT | Mod: ORL | Performed by: ADVANCED PRACTICE MIDWIFE

## 2022-05-20 PROCEDURE — 84550 ASSAY OF BLOOD/URIC ACID: CPT | Performed by: ADVANCED PRACTICE MIDWIFE

## 2022-05-20 PROCEDURE — 84450 TRANSFERASE (AST) (SGOT): CPT | Performed by: ADVANCED PRACTICE MIDWIFE

## 2022-05-27 ENCOUNTER — HOSPITAL ENCOUNTER (OUTPATIENT)
Facility: CLINIC | Age: 27
Discharge: HOME | End: 2022-05-27
Attending: ADVANCED PRACTICE MIDWIFE | Admitting: REGISTERED NURSE
Payer: COMMERCIAL

## 2022-05-27 ENCOUNTER — OFFICE VISIT (OUTPATIENT)
Dept: MATERNAL FETAL MEDICINE | Facility: CLINIC | Age: 27
End: 2022-05-27
Attending: OBSTETRICS & GYNECOLOGY
Payer: COMMERCIAL

## 2022-05-27 ENCOUNTER — HOSPITAL ENCOUNTER (OUTPATIENT)
Dept: ULTRASOUND IMAGING | Facility: CLINIC | Age: 27
Discharge: HOME | End: 2022-05-27
Attending: OBSTETRICS & GYNECOLOGY
Payer: COMMERCIAL

## 2022-05-27 ENCOUNTER — HOSPITAL ENCOUNTER (OUTPATIENT)
Facility: CLINIC | Age: 27
End: 2022-05-27
Attending: ADVANCED PRACTICE MIDWIFE | Admitting: ADVANCED PRACTICE MIDWIFE
Payer: COMMERCIAL

## 2022-05-27 VITALS — TEMPERATURE: 98.5 F | RESPIRATION RATE: 18 BRPM | SYSTOLIC BLOOD PRESSURE: 136 MMHG | DIASTOLIC BLOOD PRESSURE: 90 MMHG

## 2022-05-27 DIAGNOSIS — O13.3 GESTATIONAL HYPERTENSION, THIRD TRIMESTER: Primary | ICD-10-CM

## 2022-05-27 DIAGNOSIS — O13.3 GESTATIONAL HYPERTENSION, THIRD TRIMESTER: ICD-10-CM

## 2022-05-27 DIAGNOSIS — O26.86 PUPPP (PRURITIC URTICARIAL PAPULES AND PLAQUES OF PREGNANCY): Primary | ICD-10-CM

## 2022-05-27 DIAGNOSIS — O26.86 PUPPP (PRURITIC URTICARIAL PAPULES AND PLAQUES OF PREGNANCY): ICD-10-CM

## 2022-05-27 DIAGNOSIS — O09.93 HRP (HIGH RISK PREGNANCY), THIRD TRIMESTER: Primary | ICD-10-CM

## 2022-05-27 PROBLEM — O16.3 ELEVATED BLOOD PRESSURE AFFECTING PREGNANCY IN THIRD TRIMESTER, ANTEPARTUM: Status: ACTIVE | Noted: 2022-05-27

## 2022-05-27 LAB
ALT SERPL W P-5'-P-CCNC: 25 U/L (ref 0–50)
AST SERPL W P-5'-P-CCNC: 17 U/L (ref 0–45)
CREAT SERPL-MCNC: 0.51 MG/DL (ref 0.52–1.04)
CREAT UR-MCNC: 207 MG/DL
ERYTHROCYTE [DISTWIDTH] IN BLOOD BY AUTOMATED COUNT: 17.2 % (ref 10–15)
GFR SERPL CREATININE-BSD FRML MDRD: >90 ML/MIN/1.73M2
HCT VFR BLD AUTO: 33.6 % (ref 35–47)
HGB BLD-MCNC: 10.8 G/DL (ref 11.7–15.7)
MCH RBC QN AUTO: 27.7 PG (ref 26.5–33)
MCHC RBC AUTO-ENTMCNC: 32.1 G/DL (ref 31.5–36.5)
MCV RBC AUTO: 86 FL (ref 78–100)
PLATELET # BLD AUTO: 155 10E3/UL (ref 150–450)
PROT UR-MCNC: 0.25 G/L
PROT/CREAT 24H UR: 0.12 G/G CR (ref 0–0.2)
RBC # BLD AUTO: 3.9 10E6/UL (ref 3.8–5.2)
URATE SERPL-MCNC: 3.6 MG/DL (ref 2.6–6)
WBC # BLD AUTO: 5.8 10E3/UL (ref 4–11)

## 2022-05-27 PROCEDURE — G0463 HOSPITAL OUTPT CLINIC VISIT: HCPCS | Mod: 25

## 2022-05-27 PROCEDURE — 250N000013 HC RX MED GY IP 250 OP 250 PS 637: Performed by: ADVANCED PRACTICE MIDWIFE

## 2022-05-27 PROCEDURE — 84460 ALANINE AMINO (ALT) (SGPT): CPT | Performed by: ADVANCED PRACTICE MIDWIFE

## 2022-05-27 PROCEDURE — 76819 FETAL BIOPHYS PROFIL W/O NST: CPT | Mod: 26 | Performed by: OBSTETRICS & GYNECOLOGY

## 2022-05-27 PROCEDURE — 82565 ASSAY OF CREATININE: CPT | Performed by: ADVANCED PRACTICE MIDWIFE

## 2022-05-27 PROCEDURE — 85027 COMPLETE CBC AUTOMATED: CPT | Performed by: ADVANCED PRACTICE MIDWIFE

## 2022-05-27 PROCEDURE — 99214 OFFICE O/P EST MOD 30 MIN: CPT | Mod: 25 | Performed by: REGISTERED NURSE

## 2022-05-27 PROCEDURE — 76816 OB US FOLLOW-UP PER FETUS: CPT | Mod: 26 | Performed by: OBSTETRICS & GYNECOLOGY

## 2022-05-27 PROCEDURE — 87653 STREP B DNA AMP PROBE: CPT | Performed by: ADVANCED PRACTICE MIDWIFE

## 2022-05-27 PROCEDURE — 59025 FETAL NON-STRESS TEST: CPT

## 2022-05-27 PROCEDURE — 84550 ASSAY OF BLOOD/URIC ACID: CPT | Performed by: ADVANCED PRACTICE MIDWIFE

## 2022-05-27 PROCEDURE — 84156 ASSAY OF PROTEIN URINE: CPT | Performed by: ADVANCED PRACTICE MIDWIFE

## 2022-05-27 PROCEDURE — 84450 TRANSFERASE (AST) (SGOT): CPT | Performed by: ADVANCED PRACTICE MIDWIFE

## 2022-05-27 PROCEDURE — 36415 COLL VENOUS BLD VENIPUNCTURE: CPT | Performed by: ADVANCED PRACTICE MIDWIFE

## 2022-05-27 PROCEDURE — 59025 FETAL NON-STRESS TEST: CPT | Mod: 26 | Performed by: REGISTERED NURSE

## 2022-05-27 PROCEDURE — 76819 FETAL BIOPHYS PROFIL W/O NST: CPT

## 2022-05-27 RX ORDER — METOCLOPRAMIDE HYDROCHLORIDE 5 MG/ML
10 INJECTION INTRAMUSCULAR; INTRAVENOUS EVERY 6 HOURS PRN
Status: DISCONTINUED | OUTPATIENT
Start: 2022-05-27 | End: 2022-05-27 | Stop reason: HOSPADM

## 2022-05-27 RX ORDER — ONDANSETRON 4 MG/1
4 TABLET, ORALLY DISINTEGRATING ORAL EVERY 6 HOURS PRN
Status: DISCONTINUED | OUTPATIENT
Start: 2022-05-27 | End: 2022-05-27 | Stop reason: HOSPADM

## 2022-05-27 RX ORDER — CETIRIZINE HYDROCHLORIDE 10 MG/1
10 TABLET ORAL DAILY
Qty: 30 TABLET | Refills: 0 | Status: SHIPPED | OUTPATIENT
Start: 2022-05-27

## 2022-05-27 RX ORDER — PROCHLORPERAZINE 25 MG
25 SUPPOSITORY, RECTAL RECTAL EVERY 12 HOURS PRN
Status: DISCONTINUED | OUTPATIENT
Start: 2022-05-27 | End: 2022-05-27 | Stop reason: HOSPADM

## 2022-05-27 RX ORDER — ONDANSETRON 2 MG/ML
4 INJECTION INTRAMUSCULAR; INTRAVENOUS EVERY 6 HOURS PRN
Status: DISCONTINUED | OUTPATIENT
Start: 2022-05-27 | End: 2022-05-27 | Stop reason: HOSPADM

## 2022-05-27 RX ORDER — PROCHLORPERAZINE MALEATE 10 MG
10 TABLET ORAL EVERY 6 HOURS PRN
Status: DISCONTINUED | OUTPATIENT
Start: 2022-05-27 | End: 2022-05-27 | Stop reason: HOSPADM

## 2022-05-27 RX ORDER — DIPHENHYDRAMINE HCL 50 MG
50 CAPSULE ORAL EVERY 6 HOURS PRN
Status: DISCONTINUED | OUTPATIENT
Start: 2022-05-27 | End: 2022-05-27 | Stop reason: HOSPADM

## 2022-05-27 RX ORDER — DIPHENHYDRAMINE HCL 25 MG
25-50 CAPSULE ORAL
Qty: 25 CAPSULE | Refills: 0 | Status: ON HOLD | OUTPATIENT
Start: 2022-05-27 | End: 2022-06-09

## 2022-05-27 RX ORDER — HYDROCORTISONE 2.5 %
CREAM (GRAM) TOPICAL 2 TIMES DAILY
Qty: 30 G | Refills: 0 | Status: ON HOLD | COMMUNITY
Start: 2022-05-28 | End: 2022-06-09

## 2022-05-27 RX ORDER — HYDROCORTISONE 2.5 %
CREAM (GRAM) TOPICAL 2 TIMES DAILY
Status: DISCONTINUED | OUTPATIENT
Start: 2022-05-27 | End: 2022-05-27 | Stop reason: HOSPADM

## 2022-05-27 RX ORDER — METOCLOPRAMIDE 10 MG/1
10 TABLET ORAL EVERY 6 HOURS PRN
Status: DISCONTINUED | OUTPATIENT
Start: 2022-05-27 | End: 2022-05-27 | Stop reason: HOSPADM

## 2022-05-27 RX ADMIN — DIPHENHYDRAMINE HYDROCHLORIDE 50 MG: 50 CAPSULE ORAL at 18:45

## 2022-05-27 RX ADMIN — HYDROCORTISONE: 25 CREAM TOPICAL at 20:02

## 2022-05-27 NOTE — PROVIDER NOTIFICATION
05/27/22 1810   Provider Notification   Provider Name/Title REAL Sena CNM   Method of Notification In Department   Notification Reason Patient Arrived   Pt arrived for pre-e evaluation, had elevated BP in clinic. Currently 132/86, denies pre-e symptoms. Pt states her primary concern is small itchy blister-appearing lesions which are distributed throughout her torso (earlobe, inner thigh, forearm) x2 days. Also c/o red and swollen eyes x2 days.

## 2022-05-27 NOTE — PROGRESS NOTES
Please refer to ultrasound report under 'Imaging' Studies of 'Chart Review' tabs.    Carlos Payne M.D.

## 2022-05-28 LAB — GP B STREP DNA SPEC QL NAA+PROBE: POSITIVE

## 2022-05-28 NOTE — PROGRESS NOTES
"HOSPITAL TRIAGE NOTE  ===================    CHIEF COMPLAINT  ========================  Vandana Lagos is a 26 year old patient presenting today at 36w1d for evaluation of r/o pre-eclampsia.    Patient's last menstrual period was 2021.  Estimated Date of Delivery: 2022       HPI  ==================   Vandana was seen in the clinic at [facility] today and had elevated BP of 143/100 and was sent to L&D triage for evaluation. BP on admission was 132/86. She has a known diagnosis of GHTN in this pregnancy. Denies s/s of pre-e including HA, SOB, RUQ pain, or vision changes. She does endorse having \"itching eyes\" which she believes is related to her allergies.     She also reports new onset of itchy \"bumps\" for the past 2 days. Reports \"bumps\" on her buttocks, abdomen, hands, ears. Denies itching of palms of hands or soles of feet. She did have a BPP today that was 8/8.     Denies fever, cough, SOB or chest pain. Denies having contact with anyone who is Prenatal record and labs reviewed from [facility], through [facility] EMR.    CONTRACTIONS: none  ABDOMINAL PAIN: none  FETAL MOVEMENT: active    VAGINAL BLEEDING: none  RUPTURE OF MEMBRANES: no  PELVIC PAIN: none    PREGNANCY COMPLICATIONS: Gestational HTN  OTHER: h/o shoulder dystocia, h/o covid during pregnancy     # Pain Assessment:  Current Pain Score 2022   Patient currently in pain? denies   Pain location -   Pain descriptors -   Vandana benton pain level was assessed and she currently denies pain.        REVIEW OF SYSTEMS  =====================  C: NEGATIVE for fever, chills  INTEGUMENTARY/SKIN: POSITIVE for bumps and pruritis on buttocks bilateral, abdomen, ears bilateral and hands bilateral  E: NEGATIVE for vision changes or irritation  R: NEGATIVE for significant cough or SOB  CV: NEGATIVE for chest pain, palpitations or varicosities  GI: NEGATIVE for nausea, abdominal pain, heartburn, or change in bowel habits  : NEGATIVE for frequency, dysuria, " or hematuria  M: NEGATIVE for significant arthralgias or myalgia  N: NEGATIVE for headache, weakness, dizziness or paresthesias  P: NEGATIVE for changes in mood or affect    PROBLEM LIST  ===============  Patient Active Problem List    Diagnosis Date Noted     Elevated blood pressure affecting pregnancy in third trimester, antepartum 2022     Priority: Medium     Fetal arrhythmia affecting pregnancy, antepartum 2022     Priority: Medium     premature atrial contractions are the most common fetal irregular cardiac  rhythm. PACs normally present in the late second or early third trimester and are most often benign. PACs are, however, associated with congenital heart diease in  approximately 1-2% of cases and can progress to sustained tachycardia in 2-3% of cases. We discussed the recommendation for weekly prolonged monitoring of fetal  heart rate to screen for progression to tachycardia. I recommended that she eliminate caffeine-containing foods and products as well as avoid smoking. If the fetal  arrhythmia is present until delivery, pediatrics should be notified so they can arrange for any necessary post-parul testing (e.g. EKG).,          Vaginal bleeding 2022     Priority: Medium     Gestational hypertension 2022     Priority: Medium       Due to diagnosis of gestational hypertension, we also recommend  surveillance twice weekly as well as serial growth ultrasounds q3 weeks.       Iron deficiency anemia secondary to inadequate dietary iron intake 2022     Priority: Medium     HRP (high risk pregnancy), third trimester 2022     Priority: Medium     Vitamin D deficiency 2021     Priority: Medium     21- Vit D 7- start 4000IU every day___         HISTORIES  ==============  ALLERGIES:    No Known Allergies  PAST MEDICAL HISTORY  Past Medical History:   Diagnosis Date     Iron deficiency anemia secondary to inadequate dietary iron intake 2022      Preeclampsia 2017     Uncomplicated asthma      SOCIAL HISTORY  Social History     Socioeconomic History     Marital status: Single     Spouse name: Not on file     Number of children: Not on file     Years of education: Not on file     Highest education level: Not on file   Occupational History     Not on file   Tobacco Use     Smoking status: Former Smoker     Quit date: 2017     Years since quittin.3     Smokeless tobacco: Never Used   Substance and Sexual Activity     Alcohol use: No     Drug use: No     Sexual activity: Yes     Partners: Male   Other Topics Concern     Not on file   Social History Narrative     Not on file     Social Determinants of Health     Financial Resource Strain: Not on file   Food Insecurity: Not on file   Transportation Needs: Not on file   Physical Activity: Not on file   Stress: Not on file   Social Connections: Not on file   Intimate Partner Violence: Not on file   Housing Stability: Not on file       FAMILY HISTORY  No family history on file.  OB HISTORY  OB History    Para Term  AB Living   4 2 2 0 1 2   SAB IAB Ectopic Multiple Live Births   0 0 0 0 2      # Outcome Date GA Lbr Nikolai/2nd Weight Sex Delivery Anes PTL Lv   4 Current            3 Term 18 40w3d 06:47 / 00:16 3.771 kg (8 lb 5 oz) F Vag-Spont EPI N TENA      Complications: Preeclampsia/Hypertension, Shoulder Dystocia      Name: ALDO CALDERON      Apgar1: 8  Apgar5: 9   2 Term 17 40w1d 04:35 / 00:33 3.6 kg (7 lb 15 oz) M Vag-Spont EPI  TENA      Complications: GBS, Preeclampsia/Hypertension      Name: ALDO CALDERON      Apgar1: 8  Apgar5: 9   1 AB      AB, COMPLETE        Prenatal Labs:   Lab Results   Component Value Date    ABO O 2021    RH Neg 2021    AS Neg 2021    RUQIGG Positive (A) 2021    HEPBANG Nonreactive 2021    TREPAB Negative 2017    HGB 10.8 (L) 2022    HIAGAB Nonreactive 2022    GLU1 79 2016      Rubella- immune    ULTRASOUND(s) reviewed:   5/27/22   Cardiac activity present.  bpm.  Fetal movements present.  Presentation cephalic.  Placenta Placental site: posterior.  Umbilical cord 3 vessel cord.  Amniotic fluid Amount of AF: normal amount. MVP 6.1 cm.  EFW 32%; BPP 8/8    EXAM  ============  BP (!) 136/90   Temp 98.5  F (36.9  C) (Oral)   Resp 18   LMP 09/16/2021   Breastfeeding No   GENERAL APPEARANCE: healthy, alert and no distress  RESP: lungs clear to auscultation - no rales, rhonchi or wheezes  CV: regular rates and rhythm, normal S1 S2, no S3 or S4 and no murmur,and no varicosities  ABDOMEN:  soft, nontender, no epigastric pain  SKIN:  multiple pruritic, erythematous papules on her buttocks, abdomen, and hands  NEURO: Denies headache, blurred vision, other vision changes  PSYCH: mentation appears normal. and affect normal/bright  MS/ LEGS: Reflexes normal bilaterally and No clonus bilaterally    CONTRACTIONS: none   FETAL HEART TONES: continuous EFM- baseline 120 with moderate variability and positive accelerations. No decelerations.  NST: REACTIVE  EFW:32%tile via US today.     PELVIC EXAM: deferred  HAMILTON SCORE: n/a  PRESENTATION: VERTEX  BLOOD: no  DISCHARGE: none    ROM: no  ROMPLUS: not done    LABS: GBS and HELLP labs- ALT, AST, creatinine, uric acid, CBC with platelets, Protein/Creatinine Ratio, 0.12    Lab results reviewed- HELLP labs WNL, GBS pending    DIAGNOSIS  ============  36w1d [facility] on the [facility] Triage for hypertension evaluation   Gestational HTN  HELLP Labs WNL  NST: REACTIVE  Fetal Heart Tones:category one  Pruritic urticarial papules and plaques of pregnancy (PUPPP)     Patient Active Problem List   Diagnosis     Vitamin D deficiency     Iron deficiency anemia secondary to inadequate dietary iron intake     HRP (high risk pregnancy), third trimester     Gestational hypertension     Vaginal bleeding     Fetal arrhythmia affecting pregnancy, antepartum      Elevated blood pressure affecting pregnancy in third trimester, antepartum       PLAN  ============  Reviewed use of Benadryl prn and certrizine daily to control itching. Rx sent to pharmacy. Continue to use Hydrocortisone 2.5% ointment BID.   IOL scheduled for 6/2/22 at 0900 for GHTN  Discharge to home with PTL instructions per discharge instruction form  Call or return to the [facility] with contractions, cramping, abdominal or pelvic pain, vaginal bleeding, leaking fluid or decreased fetal movement.  Reports she does not have a follow-up clinic visit scheduled, encouraged to call and make appt early next week and f/u with rash prior to IOL.   JAQUI Arnett CNM       Fetal Non-Stress Test Results    NST Ordered By: Haley Sena CNM     NST Start & Stop Times  NST Start Time: 1756  NST Stop Time: 1826    NST Results  Fetus A   Baseline Rate: 120  Accelerations: Present  Decelerations: None  Interpretation: reactive

## 2022-05-28 NOTE — PLAN OF CARE
Data: [facility].  Reason for maternal/fetal assessment per patient is Rule Out Pre-eclampsia (Sent from clinic)  . Patient is a . Prenatal record reviewed.      OB History    Para Term  AB Living   4 2 2 0 1 2   SAB IAB Ectopic Multiple Live Births   0 0 0 0 2      # Outcome Date GA Lbr Nikolai/2nd Weight Sex Delivery Anes PTL Lv   4 Current            3 Term 18 40w3d 06:47 / 00:16 3.771 kg (8 lb 5 oz) F Vag-Spont EPI N TENA      Complications: Preeclampsia/Hypertension, Shoulder Dystocia      Name: ALDO CALDERON      Apgar1: 8  Apgar5: 9   2 Term 17 40w1d 04:35 / 00:33 3.6 kg (7 lb 15 oz) M Vag-Spont EPI  TENA      Complications: GBS, Preeclampsia/Hypertension      Name: ALDO CALDERON      Apgar1: 8  Apgar5: 9   1 AB      AB, COMPLETE         Medical History:   Past Medical History:   Diagnosis Date     Iron deficiency anemia secondary to inadequate dietary iron intake 2022     Preeclampsia 2017     Uncomplicated asthma    . Gestational Age 36w1d. VSS.  Fetal movement present. Patient denies cramping, backache, vaginal discharge, pelvic pressure, UTI symptoms, GI problems, bloody show, vaginal bleeding, edema, headache, visual disturbances, epigastric or URQ pain, abdominal pain, rupture of membranes.   Action: Verbal consent for EFM. Triage assessment completed. EFM applied for NST. Uterine assessment of ctx. Fetal assessment: Presumed adequate fetal oxygenation documented (see flow record). Patient education given and discharge instructions. Patient instructed to report change in fetal movement, vaginal leaking of fluid or bleeding, abdominal pain, or any concerns related to the pregnancy to her nurse/physician.   Response: Haley Baer CNM and Temi Gong CNM informed of pt status. Plan per provider is D/C. Patient verbalized understanding of education and verbalized agreement with plan. Discharged ambulatory at  2016.

## 2022-05-28 NOTE — DISCHARGE INSTRUCTIONS
Discharge Instructions for Undelivered Patients    Diet:  * Drink 8 to 12 glasses of liquids (milk, juice, water) every day  * You may eat meals and snacks.    Activity:  * Count fetal kicks every day (see handout).  * Call your doctor or nurse midwife if your baby is moving less than usual.    Call your provider if you notice:  * Swelling in your face or increased swelling in your hands or legs.  * Headaches that are not relieved by Tylenol (acetaminophen).  * Changes in your vision (blurring; seeing spots or stars).  * Nausea (sick to your stomach) and vomiting (throwing up).  * Weight gain of 5 pounds per week.  * Heartburn that doesn't go away.  * Signs of bladder infection: Pain when you urinate (use the toilet), needing to go more often or more urgently.  * The bag of julien (membrane) breaks, or you notice leaking in your underwear.  * Bright red blood in your underwear.  * Abdominal (lower belly) or stomach pain.  * For first baby: Contractions (tightenings) less than 5 minutes apart for one hour or more.  * Increase or change in vaginal discharge (note the color and amount).

## 2022-05-31 ENCOUNTER — LAB (OUTPATIENT)
Dept: LAB | Facility: CLINIC | Age: 27
End: 2022-05-31
Attending: REGISTERED NURSE
Payer: COMMERCIAL

## 2022-05-31 DIAGNOSIS — O09.93 HRP (HIGH RISK PREGNANCY), THIRD TRIMESTER: ICD-10-CM

## 2022-05-31 PROCEDURE — U0003 INFECTIOUS AGENT DETECTION BY NUCLEIC ACID (DNA OR RNA); SEVERE ACUTE RESPIRATORY SYNDROME CORONAVIRUS 2 (SARS-COV-2) (CORONAVIRUS DISEASE [COVID-19]), AMPLIFIED PROBE TECHNIQUE, MAKING USE OF HIGH THROUGHPUT TECHNOLOGIES AS DESCRIBED BY CMS-2020-01-R: HCPCS

## 2022-05-31 PROCEDURE — U0005 INFEC AGEN DETEC AMPLI PROBE: HCPCS

## 2022-06-01 LAB — SARS-COV-2 RNA RESP QL NAA+PROBE: NEGATIVE

## 2022-06-02 ENCOUNTER — TELEPHONE (OUTPATIENT)
Dept: OBGYN | Facility: CLINIC | Age: 27
End: 2022-06-02
Payer: COMMERCIAL

## 2022-06-02 NOTE — TELEPHONE ENCOUNTER
Pt scheduled for IOL earlier today for GHTN, which had to be delayed d/t unit status.  Pt would like to delay IOL until tomorrow morning as she just moved into new apartment and would like to prepare it for baby.  As patient has had normal to mild range BPs in clinic, reasonable to delay until tomorrow morning.  Reviewed induction process with patient re: cervical ripening vs. Pitocin.  Reviewed warning signs and when to present/call.  Pt verbalized understanding and agreement to plan.     JAQUI TorresM

## 2022-06-07 ENCOUNTER — HOSPITAL ENCOUNTER (INPATIENT)
Facility: CLINIC | Age: 27
LOS: 3 days | Discharge: HOME HEALTH | End: 2022-06-10
Attending: ADVANCED PRACTICE MIDWIFE | Admitting: ADVANCED PRACTICE MIDWIFE
Payer: COMMERCIAL

## 2022-06-07 DIAGNOSIS — O13.3 GESTATIONAL HYPERTENSION, THIRD TRIMESTER: ICD-10-CM

## 2022-06-07 LAB
ABO/RH(D): ABNORMAL
ALT SERPL W P-5'-P-CCNC: 17 U/L (ref 0–50)
ANTIBODY ID: NORMAL
ANTIBODY SCREEN: POSITIVE
AST SERPL W P-5'-P-CCNC: 15 U/L (ref 0–45)
CREAT SERPL-MCNC: 0.46 MG/DL (ref 0.52–1.04)
CREAT UR-MCNC: 150 MG/DL
ERYTHROCYTE [DISTWIDTH] IN BLOOD BY AUTOMATED COUNT: 16.5 % (ref 10–15)
GFR SERPL CREATININE-BSD FRML MDRD: >90 ML/MIN/1.73M2
HCT VFR BLD AUTO: 32.9 % (ref 35–47)
HGB BLD-MCNC: 10.8 G/DL (ref 11.7–15.7)
MCH RBC QN AUTO: 27.6 PG (ref 26.5–33)
MCHC RBC AUTO-ENTMCNC: 32.8 G/DL (ref 31.5–36.5)
MCV RBC AUTO: 84 FL (ref 78–100)
PLATELET # BLD AUTO: 145 10E3/UL (ref 150–450)
PROT UR-MCNC: 0.21 G/L
PROT/CREAT 24H UR: 0.14 G/G CR (ref 0–0.2)
RBC # BLD AUTO: 3.92 10E6/UL (ref 3.8–5.2)
SARS-COV-2 RNA RESP QL NAA+PROBE: NEGATIVE
SPECIMEN EXPIRATION DATE: ABNORMAL
SPECIMEN EXPIRATION DATE: NORMAL
T PALLIDUM AB SER QL: NONREACTIVE
WBC # BLD AUTO: 5.7 10E3/UL (ref 4–11)

## 2022-06-07 PROCEDURE — 120N000002 HC R&B MED SURG/OB UMMC

## 2022-06-07 PROCEDURE — U0005 INFEC AGEN DETEC AMPLI PROBE: HCPCS | Performed by: ADVANCED PRACTICE MIDWIFE

## 2022-06-07 PROCEDURE — 3E0P7VZ INTRODUCTION OF HORMONE INTO FEMALE REPRODUCTIVE, VIA NATURAL OR ARTIFICIAL OPENING: ICD-10-PCS | Performed by: ADVANCED PRACTICE MIDWIFE

## 2022-06-07 PROCEDURE — 86901 BLOOD TYPING SEROLOGIC RH(D): CPT | Performed by: ADVANCED PRACTICE MIDWIFE

## 2022-06-07 PROCEDURE — 84156 ASSAY OF PROTEIN URINE: CPT | Performed by: ADVANCED PRACTICE MIDWIFE

## 2022-06-07 PROCEDURE — 36415 COLL VENOUS BLD VENIPUNCTURE: CPT | Performed by: ADVANCED PRACTICE MIDWIFE

## 2022-06-07 PROCEDURE — 86780 TREPONEMA PALLIDUM: CPT | Performed by: ADVANCED PRACTICE MIDWIFE

## 2022-06-07 PROCEDURE — 84450 TRANSFERASE (AST) (SGOT): CPT | Performed by: ADVANCED PRACTICE MIDWIFE

## 2022-06-07 PROCEDURE — 86870 RBC ANTIBODY IDENTIFICATION: CPT | Performed by: ADVANCED PRACTICE MIDWIFE

## 2022-06-07 PROCEDURE — 85027 COMPLETE CBC AUTOMATED: CPT | Performed by: ADVANCED PRACTICE MIDWIFE

## 2022-06-07 PROCEDURE — 250N000013 HC RX MED GY IP 250 OP 250 PS 637: Performed by: ADVANCED PRACTICE MIDWIFE

## 2022-06-07 PROCEDURE — 82565 ASSAY OF CREATININE: CPT | Performed by: ADVANCED PRACTICE MIDWIFE

## 2022-06-07 PROCEDURE — 84460 ALANINE AMINO (ALT) (SGPT): CPT | Performed by: ADVANCED PRACTICE MIDWIFE

## 2022-06-07 PROCEDURE — 86850 RBC ANTIBODY SCREEN: CPT | Performed by: ADVANCED PRACTICE MIDWIFE

## 2022-06-07 RX ORDER — METHYLERGONOVINE MALEATE 0.2 MG/ML
200 INJECTION INTRAVENOUS
Status: DISCONTINUED | OUTPATIENT
Start: 2022-06-07 | End: 2022-06-09 | Stop reason: HOSPADM

## 2022-06-07 RX ORDER — NALOXONE HYDROCHLORIDE 0.4 MG/ML
0.4 INJECTION, SOLUTION INTRAMUSCULAR; INTRAVENOUS; SUBCUTANEOUS
Status: DISCONTINUED | OUTPATIENT
Start: 2022-06-07 | End: 2022-06-09 | Stop reason: HOSPADM

## 2022-06-07 RX ORDER — OXYTOCIN 10 [USP'U]/ML
10 INJECTION, SOLUTION INTRAMUSCULAR; INTRAVENOUS
Status: DISCONTINUED | OUTPATIENT
Start: 2022-06-07 | End: 2022-06-09

## 2022-06-07 RX ORDER — ONDANSETRON 4 MG/1
4 TABLET, ORALLY DISINTEGRATING ORAL EVERY 6 HOURS PRN
Status: DISCONTINUED | OUTPATIENT
Start: 2022-06-07 | End: 2022-06-09 | Stop reason: HOSPADM

## 2022-06-07 RX ORDER — TRANEXAMIC ACID 10 MG/ML
1 INJECTION, SOLUTION INTRAVENOUS EVERY 30 MIN PRN
Status: DISCONTINUED | OUTPATIENT
Start: 2022-06-07 | End: 2022-06-09 | Stop reason: HOSPADM

## 2022-06-07 RX ORDER — MISOPROSTOL 100 UG/1
25 TABLET ORAL EVERY 4 HOURS PRN
Status: DISCONTINUED | OUTPATIENT
Start: 2022-06-07 | End: 2022-06-08

## 2022-06-07 RX ORDER — MORPHINE SULFATE 10 MG/ML
10 INJECTION, SOLUTION INTRAMUSCULAR; INTRAVENOUS ONCE
Status: COMPLETED | OUTPATIENT
Start: 2022-06-07 | End: 2022-06-08

## 2022-06-07 RX ORDER — MISOPROSTOL 200 UG/1
400 TABLET ORAL
Status: DISCONTINUED | OUTPATIENT
Start: 2022-06-07 | End: 2022-06-09

## 2022-06-07 RX ORDER — KETOROLAC TROMETHAMINE 30 MG/ML
30 INJECTION, SOLUTION INTRAMUSCULAR; INTRAVENOUS
Status: DISCONTINUED | OUTPATIENT
Start: 2022-06-07 | End: 2022-06-10 | Stop reason: HOSPADM

## 2022-06-07 RX ORDER — ONDANSETRON 2 MG/ML
4 INJECTION INTRAMUSCULAR; INTRAVENOUS EVERY 6 HOURS PRN
Status: DISCONTINUED | OUTPATIENT
Start: 2022-06-07 | End: 2022-06-09 | Stop reason: HOSPADM

## 2022-06-07 RX ORDER — PROCHLORPERAZINE 25 MG
25 SUPPOSITORY, RECTAL RECTAL EVERY 12 HOURS PRN
Status: DISCONTINUED | OUTPATIENT
Start: 2022-06-07 | End: 2022-06-09 | Stop reason: HOSPADM

## 2022-06-07 RX ORDER — FENTANYL CITRATE 50 UG/ML
100 INJECTION, SOLUTION INTRAMUSCULAR; INTRAVENOUS
Status: DISCONTINUED | OUTPATIENT
Start: 2022-06-07 | End: 2022-06-09 | Stop reason: HOSPADM

## 2022-06-07 RX ORDER — OXYTOCIN/0.9 % SODIUM CHLORIDE 30/500 ML
340 PLASTIC BAG, INJECTION (ML) INTRAVENOUS CONTINUOUS PRN
Status: DISCONTINUED | OUTPATIENT
Start: 2022-06-07 | End: 2022-06-09 | Stop reason: HOSPADM

## 2022-06-07 RX ORDER — NALOXONE HYDROCHLORIDE 0.4 MG/ML
0.2 INJECTION, SOLUTION INTRAMUSCULAR; INTRAVENOUS; SUBCUTANEOUS
Status: DISCONTINUED | OUTPATIENT
Start: 2022-06-07 | End: 2022-06-09 | Stop reason: HOSPADM

## 2022-06-07 RX ORDER — CITRIC ACID/SODIUM CITRATE 334-500MG
30 SOLUTION, ORAL ORAL
Status: DISCONTINUED | OUTPATIENT
Start: 2022-06-07 | End: 2022-06-09 | Stop reason: HOSPADM

## 2022-06-07 RX ORDER — PENICILLIN G 3000000 [IU]/50ML
3 INJECTION, SOLUTION INTRAVENOUS EVERY 4 HOURS
Status: DISCONTINUED | OUTPATIENT
Start: 2022-06-07 | End: 2022-06-09 | Stop reason: HOSPADM

## 2022-06-07 RX ORDER — PENICILLIN G POTASSIUM 5000000 [IU]/1
5 INJECTION, POWDER, FOR SOLUTION INTRAMUSCULAR; INTRAVENOUS ONCE
Status: COMPLETED | OUTPATIENT
Start: 2022-06-07 | End: 2022-06-08

## 2022-06-07 RX ORDER — HYDROXYZINE HYDROCHLORIDE 50 MG/1
100 TABLET, FILM COATED ORAL ONCE
Status: COMPLETED | OUTPATIENT
Start: 2022-06-07 | End: 2022-06-08

## 2022-06-07 RX ORDER — IBUPROFEN 800 MG/1
800 TABLET, FILM COATED ORAL
Status: DISCONTINUED | OUTPATIENT
Start: 2022-06-07 | End: 2022-06-10 | Stop reason: HOSPADM

## 2022-06-07 RX ORDER — OXYTOCIN/0.9 % SODIUM CHLORIDE 30/500 ML
100-340 PLASTIC BAG, INJECTION (ML) INTRAVENOUS CONTINUOUS PRN
Status: DISCONTINUED | OUTPATIENT
Start: 2022-06-07 | End: 2022-06-09

## 2022-06-07 RX ORDER — PROCHLORPERAZINE MALEATE 10 MG
10 TABLET ORAL EVERY 6 HOURS PRN
Status: DISCONTINUED | OUTPATIENT
Start: 2022-06-07 | End: 2022-06-09 | Stop reason: HOSPADM

## 2022-06-07 RX ORDER — METOCLOPRAMIDE HYDROCHLORIDE 5 MG/ML
10 INJECTION INTRAMUSCULAR; INTRAVENOUS EVERY 6 HOURS PRN
Status: DISCONTINUED | OUTPATIENT
Start: 2022-06-07 | End: 2022-06-09 | Stop reason: HOSPADM

## 2022-06-07 RX ORDER — MISOPROSTOL 200 UG/1
800 TABLET ORAL
Status: DISCONTINUED | OUTPATIENT
Start: 2022-06-07 | End: 2022-06-10 | Stop reason: HOSPADM

## 2022-06-07 RX ORDER — LIDOCAINE 40 MG/G
CREAM TOPICAL
Status: DISCONTINUED | OUTPATIENT
Start: 2022-06-07 | End: 2022-06-09 | Stop reason: HOSPADM

## 2022-06-07 RX ORDER — OXYTOCIN 10 [USP'U]/ML
10 INJECTION, SOLUTION INTRAMUSCULAR; INTRAVENOUS
Status: DISCONTINUED | OUTPATIENT
Start: 2022-06-07 | End: 2022-06-09 | Stop reason: HOSPADM

## 2022-06-07 RX ORDER — SODIUM CHLORIDE, SODIUM LACTATE, POTASSIUM CHLORIDE, CALCIUM CHLORIDE 600; 310; 30; 20 MG/100ML; MG/100ML; MG/100ML; MG/100ML
INJECTION, SOLUTION INTRAVENOUS CONTINUOUS
Status: DISCONTINUED | OUTPATIENT
Start: 2022-06-07 | End: 2022-06-09 | Stop reason: HOSPADM

## 2022-06-07 RX ORDER — METOCLOPRAMIDE 10 MG/1
10 TABLET ORAL EVERY 6 HOURS PRN
Status: DISCONTINUED | OUTPATIENT
Start: 2022-06-07 | End: 2022-06-09 | Stop reason: HOSPADM

## 2022-06-07 RX ORDER — CARBOPROST TROMETHAMINE 250 UG/ML
250 INJECTION, SOLUTION INTRAMUSCULAR
Status: DISCONTINUED | OUTPATIENT
Start: 2022-06-07 | End: 2022-06-09 | Stop reason: HOSPADM

## 2022-06-07 RX ADMIN — MISOPROSTOL 25 MCG: 100 TABLET ORAL at 19:48

## 2022-06-07 RX ADMIN — MISOPROSTOL 25 MCG: 100 TABLET ORAL at 15:18

## 2022-06-07 RX ADMIN — MISOPROSTOL 25 MCG: 100 TABLET ORAL at 11:07

## 2022-06-07 ASSESSMENT — ACTIVITIES OF DAILY LIVING (ADL)
ADLS_ACUITY_SCORE: 18

## 2022-06-07 NOTE — PLAN OF CARE
Goal Outcome Evaluation:    Plan of Care Reviewed With: patient     Overall Patient Progress: no change    VSS. First dose of vaginal miso given at 1110. Pt feeling occasional cramps, resting through them. IV saline locked in left wrist. 's with moderate variability. Intermittent variable decels that resolved with repositioning. Bird irregularly, palpate mild. Continue plan of care.

## 2022-06-07 NOTE — PLAN OF CARE
Pt here for scheduled IOL for GHTN. BP mild range on admission. Pt denies HA, RUQ pain, or visual disturbances. EFM AGA - see flowsheet. Pt denies contractions, LOF, or bleeding. Pt continues to have red itchy spots on abdomen, hands, arms, legs and feet. Pt planning an un-medicated delivery. Continue plan of care.

## 2022-06-07 NOTE — PROGRESS NOTES
"Labor progress note    S: Vandana is resting comfortably, she states that is she not feeling contractions.     O:  Blood pressure 120/66, temperature 98.4  F (36.9  C), temperature source Oral, resp. rate 18, height 1.778 m (5' 10\"), weight 111.9 kg (246 lb 9.6 oz), last menstrual period 09/16/2021, not currently breastfeeding.  General appearance: comfortable.  Contractions: Every 3-6 minutes.  seconds duration.  Palpate: mild.  FHT: Baseline 125 with moderate variability. Accelerations present. Previously noted non-recurrent variable decelerations that resolved with position change.   ROM: not ruptured    Pelvic exam: deferred   S/p 1 dose of Misoprostol, second dose due at 1515    Results for orders placed or performed during the hospital encounter of 06/07/22 (from the past 24 hour(s))   Asymptomatic COVID-19 Virus (Coronavirus) by PCR Nose    Specimen: Nose; Swab   Result Value Ref Range    SARS CoV2 PCR Negative Negative    Narrative    Testing was performed using the amy  SARS-CoV-2 & Influenza A/B Assay on the amy  Greta  System.  This test should be ordered for the detection of SARS-COV-2 in individuals who meet SARS-CoV-2 clinical and/or epidemiological criteria. Test performance is unknown in asymptomatic patients.  This test is for in vitro diagnostic use under the FDA EUA for laboratories certified under CLIA to perform moderate and/or high complexity testing. This test has not been FDA cleared or approved.  A negative test does not rule out the presence of PCR inhibitors in the specimen or target RNA in concentration below the limit of detection for the assay. The possibility of a false negative should be considered if the patient's recent exposure or clinical presentation suggests COVID-19.  [facility] are certified under the Clinical Laboratory Improvement Amendments of 1988 (CLIA-88) as qualified to perform moderate and/or high complexity laboratory testing.   Treponema " Abs w Reflex to RPR and Titer   Result Value Ref Range    Treponema Antibody Total Nonreactive Nonreactive   ABO/Rh type and screen    Narrative    The following orders were created for panel order ABO/Rh type and screen.  Procedure                               Abnormality         Status                     ---------                               -----------         ------                     Adult Type and Screen[[id number]]        Abnormal            Final result                 Please view results for these tests on the individual orders.   CBC with platelets   Result Value Ref Range    WBC Count 5.7 4.0 - 11.0 10e3/uL    RBC Count 3.92 3.80 - 5.20 10e6/uL    Hemoglobin 10.8 (L) 11.7 - 15.7 g/dL    Hematocrit 32.9 (L) 35.0 - 47.0 %    MCV 84 78 - 100 fL    MCH 27.6 26.5 - 33.0 pg    MCHC 32.8 31.5 - 36.5 g/dL    RDW 16.5 (H) 10.0 - 15.0 %    Platelet Count 145 (L) 150 - 450 10e3/uL   AST   Result Value Ref Range    AST 15 0 - 45 U/L   ALT   Result Value Ref Range    ALT 17 0 - 50 U/L   Creatinine   Result Value Ref Range    Creatinine 0.46 (L) 0.52 - 1.04 mg/dL    GFR Estimate >90 >60 mL/min/1.73m2   Adult Type and Screen   Result Value Ref Range    ABO/RH(D) O NEG     Antibody Screen Positive (A) Negative    SPECIMEN EXPIRATION DATE [id number]    Antibody identification   Result Value Ref Range    Antibody Identification Anti-D post RhoGAM     SPECIMEN EXPIRATION DATE [id number]    Protein  random urine   Result Value Ref Range    Total Protein Random Urine g/L 0.21 g/L    Total Protein Urine g/gr Creatinine 0.14 0.00 - 0.20 g/g Cr    Creatinine Urine mg/dL 150 mg/dL       A:  IUP @ 37w5d IOL for gestational hypertension   Fetal Heart rate tracing Category one  GBS- positive  Patient Active Problem List   Diagnosis     Vitamin D deficiency     Iron deficiency anemia secondary to inadequate dietary iron intake     HRP (high risk pregnancy), third trimester     Gestational hypertension     Vaginal bleeding  "    Fetal arrhythmia affecting pregnancy, antepartum     Elevated blood pressure affecting pregnancy in third trimester, antepartum     PUPPP (pruritic urticarial papules and plaques of pregnancy)     Labor and delivery, indication for care         P:  Continue cervical ripening with Misoprostol, consider mechanical ripening with Ledesma balloon if jayleen too frequently for additional doses of Misoprostol.  Prophylactic antibiotic for + GBS status with rupture of membranes or active labor.     \"I,am serving as a scribe to document services personally performed by Olga Damian CNM based on the provider's statements to me.\" - Kimberley ENGEL     I agree with the PFSH and ROS as completed by MAREN Stafford except for changes made by me. The remainder of the encounter was performed by me and scribed by MAREN Stafford. The scribed note accurately reflects my personal services and decisions made by me.   JAQUI Rouse CNM        "

## 2022-06-07 NOTE — H&P
"ADMIT NOTE  =================  37w5d    Vandana Lagos is a 26 year old female with an Patient's last menstrual period was 2021. and Estimated Date of Delivery: 2022 [facility] at 10:36 AM for induction of labor.  Indication: gestational hypertension.     HPI  ================  Patient has been seen at the [facility] clinic for prenatal care.  Diagnosed with gestational hypertension at 35w gestation.  She had initiated  surveillance and has had a recent growth ultrasound.  Patient denies headache, visual changes or epigastric pain.  Denies feeling contractions, but does report regular fetal movement.  Denies vaginal bleeding or leaking fluid.  Denies fever, cough, SOB or chest pain. Denies having contact with anyone who is Covid-19 positive. Pt has not had Covid-19 Vaccinations. Is Agreeable to Covid-19 testing  GBS- positive- not treated until active labor  FOB- is involved, planning to be present when he is finished working.  Other labor support- none    Weight gain- 243 - 214 lbs, Total weight gain- 43 lbs  Height- 60\"  BMI- 30  First prenatal visit at 13 weeks, Total visits- 8    PROBLEM LIST  =================  Patient Active Problem List    Diagnosis Date Noted     Labor and delivery, indication for care 2022     Priority: Medium     Elevated blood pressure affecting pregnancy in third trimester, antepartum 2022     Priority: Medium     PUPPP (pruritic urticarial papules and plaques of pregnancy) 2022     Priority: Medium     Diagnosed in triage 22: Rx sent for benadryl prn, advised cetrizine daily, hydrocortisone 2.5% ointment        Fetal arrhythmia affecting pregnancy, antepartum 2022     Priority: Medium     premature atrial contractions are the most common fetal irregular cardiac  rhythm. PACs normally present in the late second or early third trimester and are most often benign. PACs are, however, associated with " congenital heart diease in  approximately 1-2% of cases and can progress to sustained tachycardia in 2-3% of cases. We discussed the recommendation for weekly prolonged monitoring of fetal  heart rate to screen for progression to tachycardia. I recommended that she eliminate caffeine-containing foods and products as well as avoid smoking. If the fetal  arrhythmia is present until delivery, pediatrics should be notified so they can arrange for any necessary post-parul testing (e.g. EKG).,          Vaginal bleeding 2022     Priority: Medium     Gestational hypertension 2022     Priority: Medium       Due to diagnosis of gestational hypertension, we also recommend  surveillance twice weekly as well as serial growth ultrasounds q3 weeks.  22: HELLP labs WNL       Iron deficiency anemia secondary to inadequate dietary iron intake 2022     Priority: Medium     HRP (high risk pregnancy), third trimester 2022     Priority: Medium     Vitamin D deficiency 2021     Priority: Medium     21- Vit D 7- start 4000IU every day___         HISTORIES  ============  No Known Allergies  Past Medical History:   Diagnosis Date     Iron deficiency anemia secondary to inadequate dietary iron intake 2022     Preeclampsia 2017     Uncomplicated asthma      Past Surgical History:   Procedure Laterality Date     ENT SURGERY     .  No family history on file.  Social History     Tobacco Use     Smoking status: Former Smoker     Quit date: 2017     Years since quittin.3     Smokeless tobacco: Never Used   Substance Use Topics     Alcohol use: No     OB History    Para Term  AB Living   4 2 2 0 1 2   SAB IAB Ectopic Multiple Live Births   0 0 0 0 2      # Outcome Date GA Lbr Nikolai/2nd Weight Sex Delivery Anes PTL Lv   4 Current            3 Term 18 40w3d 06:47 / 00:16 3.771 kg (8 lb 5 oz) F Vag-Spont EPI N TENA      Complications: Preeclampsia/Hypertension,  Shoulder Dystocia      Name: ALDO CALDERON      Apgar1: 8  Apgar5: 9   2 Term 02/21/17 40w1d 04:35 / 00:33 3.6 kg (7 lb 15 oz) M Vag-Spont EPI  TENA      Complications: GBS, Preeclampsia/Hypertension      Name: ALDO CALDERON      Apgar1: 8  Apgar5: 9   1 AB      AB, COMPLETE           LABS:   ===========  Prenatal Labs:  Rhogam not indicated   Lab Results   Component Value Date    ABO O 06/25/2021    RH Neg 06/25/2021    AS Neg 06/25/2021    RUQIGG Positive (A) 11/12/2021    HEPBANG Nonreactive 11/12/2021    TREPAB Negative 02/20/2017    HGB 10.8 (L) 06/07/2022    HIAGAB Nonreactive 04/21/2022    GLU1 79 11/23/2016     Rubella immune  Lab Results   Component Value Date    GBS Positive 02/06/2017     Other labs:  COVID-19 PCR Results    COVID-19 PCR Results 10/16/20 12/11/20 6/25/21 1/3/22 5/31/22 6/7/22   COVID-19 Virus by PCR (External Result) Not Detected Negative       SARS-CoV-2 Virus Specimen Source   Nasopharyngeal      SARS-CoV-2 PCR Result   NEGATIVE      SARS CoV2 PCR    Positive (A) Negative Negative   (A) Abnormal value       Comments are available for some flowsheets but are not being displayed.         COVID-19 Antibody Results, Testing for Immunity    COVID-19 Antibody Results, Testing for Immunity   No data to display.            Results for orders placed or performed during the hospital encounter of 06/07/22 (from the past 24 hour(s))   Asymptomatic COVID-19 Virus (Coronavirus) by PCR Nose    Specimen: Nose; Swab   Result Value Ref Range    SARS CoV2 PCR Negative Negative    Narrative    Testing was performed using the amy  SARS-CoV-2 & Influenza A/B Assay on the amy  Greta  System.  This test should be ordered for the detection of SARS-COV-2 in individuals who meet SARS-CoV-2 clinical and/or epidemiological criteria. Test performance is unknown in asymptomatic patients.  This test is for in vitro diagnostic use under the FDA EUA for laboratories certified under CLIA to  perform moderate and/or high complexity testing. This test has not been FDA cleared or approved.  A negative test does not rule out the presence of PCR inhibitors in the specimen or target RNA in concentration below the limit of detection for the assay. The possibility of a false negative should be considered if the patient's recent exposure or clinical presentation suggests COVID-19.  [facility] are certified under the Clinical Laboratory Improvement Amendments of 1988 (CLIA-88) as qualified to perform moderate and/or high complexity laboratory testing.   ABO/Rh type and screen    Narrative    The following orders were created for panel order ABO/Rh type and screen.  Procedure                               Abnormality         Status                     ---------                               -----------         ------                     Adult Type and Screen[[id number]]                            In process                   Please view results for these tests on the individual orders.   CBC with platelets   Result Value Ref Range    WBC Count 5.7 4.0 - 11.0 10e3/uL    RBC Count 3.92 3.80 - 5.20 10e6/uL    Hemoglobin 10.8 (L) 11.7 - 15.7 g/dL    Hematocrit 32.9 (L) 35.0 - 47.0 %    MCV 84 78 - 100 fL    MCH 27.6 26.5 - 33.0 pg    MCHC 32.8 31.5 - 36.5 g/dL    RDW 16.5 (H) 10.0 - 15.0 %    Platelet Count 145 (L) 150 - 450 10e3/uL       ROS  =========  Pt denies significant respiratory, cardiovacular, GI, or muscular/skeletalcomplaints.    See RN data base ROS.       PHYSICAL EXAM:  ===============  BP (!) 129/93   Temp 98.4  F (36.9  C) (Oral)   Resp 18   LMP 09/16/2021   General appearance: comfortable  GENERAL APPEARANCE: healthy, alert and no distress  RESP: lungs clear to auscultation - no rales, rhonchi or wheezes  CV: regular rates and rhythm, normal S1 S2, no S3 or S4 and no murmur,and no varicosities  ABDOMEN:  soft, nontender, no epigastric pain  SKIN: no suspicious lesions or  rashes  NEURO: Denies headache, blurred vision, other vision changes  PSYCH: mentation appears normal. and affect normal/bright  Legs: Reflexes normal bilaterally, no clonus and 1+ edema      Abdomen: gravid, vertex fetus per Leopold's, non-tender between contractions.   Cephalic presentation confirmed by palpation  EFW-  6 lbs.   CONTRACTIONS: none  FETAL HEART TONES: continuous EFM- baseline 135 with moderate variability and positive accelerations. No decelerations.  PELVIC EXAM: 1/ 40%/ Posterior/ firm/ -3   HAMILTON SCORE: 2  BLOODY SHOW: no   ROM:no      # Pain Assessment:  Current Pain Score 6/7/2022   Patient currently in pain? denies   Pain location -   Pain descriptors -           ASSESSMENT:  ==============  IUP @ 37w5d admitted for induction of labor.  Indication: gestational hypertension   NST REACTIVE  Fetal Heart Tones - category one  GBS- positive  Covid- Negative    Patient Active Problem List   Diagnosis     Vitamin D deficiency     Iron deficiency anemia secondary to inadequate dietary iron intake     HRP (high risk pregnancy), third trimester     Gestational hypertension     Vaginal bleeding     Fetal arrhythmia affecting pregnancy, antepartum     Elevated blood pressure affecting pregnancy in third trimester, antepartum     PUPPP (pruritic urticarial papules and plaques of pregnancy)     Labor and delivery, indication for care       PLAN:  ===========  Admit - see IP orders  Baseline HELLP labs ordered  IV access placed  Plan PCN with rupture of membranes or active labor  Reviewed the need for cervical ripening with patient.  R/B/A to the following: oral and vaginal misoprostol, reilly balloon, and Pitocin.  Patient gives consent for vaginal Misoprostol.  Plan to allow patient to eat her breakfast then place tablet.  Consider Reilly insertion with Misoprostol if patient consents.   JAQUI Rouse CNM

## 2022-06-07 NOTE — PROVIDER NOTIFICATION
06/07/22 0912   Provider Notification   Provider Name/Title Olga Damian CNM   Method of Notification Electronic Page   Request Evaluate in Person   Notification Reason Patient Arrived   FYI pt here for scheduled IOL.

## 2022-06-08 LAB
ALT SERPL W P-5'-P-CCNC: 13 U/L (ref 0–50)
AST SERPL W P-5'-P-CCNC: 11 U/L (ref 0–45)
CREAT SERPL-MCNC: 0.41 MG/DL (ref 0.52–1.04)
ERYTHROCYTE [DISTWIDTH] IN BLOOD BY AUTOMATED COUNT: 16.5 % (ref 10–15)
GFR SERPL CREATININE-BSD FRML MDRD: >90 ML/MIN/1.73M2
HCT VFR BLD AUTO: 35.6 % (ref 35–47)
HGB BLD-MCNC: 11.5 G/DL (ref 11.7–15.7)
HOLD SPECIMEN: NORMAL
MCH RBC QN AUTO: 27.3 PG (ref 26.5–33)
MCHC RBC AUTO-ENTMCNC: 32.3 G/DL (ref 31.5–36.5)
MCV RBC AUTO: 84 FL (ref 78–100)
PLATELET # BLD AUTO: 147 10E3/UL (ref 150–450)
RBC # BLD AUTO: 4.22 10E6/UL (ref 3.8–5.2)
URATE SERPL-MCNC: 3.1 MG/DL (ref 2.6–6)
WBC # BLD AUTO: 7 10E3/UL (ref 4–11)

## 2022-06-08 PROCEDURE — 250N000011 HC RX IP 250 OP 636: Performed by: REGISTERED NURSE

## 2022-06-08 PROCEDURE — 59409 OBSTETRICAL CARE: CPT | Performed by: MIDWIFE

## 2022-06-08 PROCEDURE — 85027 COMPLETE CBC AUTOMATED: CPT | Performed by: ADVANCED PRACTICE MIDWIFE

## 2022-06-08 PROCEDURE — 84460 ALANINE AMINO (ALT) (SGPT): CPT | Performed by: ADVANCED PRACTICE MIDWIFE

## 2022-06-08 PROCEDURE — 258N000003 HC RX IP 258 OP 636: Performed by: REGISTERED NURSE

## 2022-06-08 PROCEDURE — 722N000001 HC LABOR CARE VAGINAL DELIVERY SINGLE

## 2022-06-08 PROCEDURE — 250N000013 HC RX MED GY IP 250 OP 250 PS 637: Performed by: MIDWIFE

## 2022-06-08 PROCEDURE — 250N000011 HC RX IP 250 OP 636: Performed by: ADVANCED PRACTICE MIDWIFE

## 2022-06-08 PROCEDURE — 84450 TRANSFERASE (AST) (SGOT): CPT | Performed by: ADVANCED PRACTICE MIDWIFE

## 2022-06-08 PROCEDURE — 250N000013 HC RX MED GY IP 250 OP 250 PS 637: Performed by: REGISTERED NURSE

## 2022-06-08 PROCEDURE — 250N000009 HC RX 250: Performed by: REGISTERED NURSE

## 2022-06-08 PROCEDURE — 120N000002 HC R&B MED SURG/OB UMMC

## 2022-06-08 PROCEDURE — 250N000009 HC RX 250: Performed by: ADVANCED PRACTICE MIDWIFE

## 2022-06-08 PROCEDURE — 36415 COLL VENOUS BLD VENIPUNCTURE: CPT | Performed by: ADVANCED PRACTICE MIDWIFE

## 2022-06-08 PROCEDURE — 10907ZC DRAINAGE OF AMNIOTIC FLUID, THERAPEUTIC FROM PRODUCTS OF CONCEPTION, VIA NATURAL OR ARTIFICIAL OPENING: ICD-10-PCS | Performed by: ADVANCED PRACTICE MIDWIFE

## 2022-06-08 PROCEDURE — 84550 ASSAY OF BLOOD/URIC ACID: CPT | Performed by: ADVANCED PRACTICE MIDWIFE

## 2022-06-08 PROCEDURE — 82565 ASSAY OF CREATININE: CPT | Performed by: ADVANCED PRACTICE MIDWIFE

## 2022-06-08 PROCEDURE — 258N000003 HC RX IP 258 OP 636: Performed by: ADVANCED PRACTICE MIDWIFE

## 2022-06-08 RX ORDER — OXYTOCIN 10 [USP'U]/ML
INJECTION, SOLUTION INTRAMUSCULAR; INTRAVENOUS
Status: DISCONTINUED
Start: 2022-06-08 | End: 2022-06-09 | Stop reason: HOSPADM

## 2022-06-08 RX ORDER — LIDOCAINE HYDROCHLORIDE 10 MG/ML
INJECTION, SOLUTION EPIDURAL; INFILTRATION; INTRACAUDAL; PERINEURAL
Status: DISCONTINUED
Start: 2022-06-08 | End: 2022-06-09 | Stop reason: HOSPADM

## 2022-06-08 RX ORDER — ACETAMINOPHEN 325 MG/1
975 TABLET ORAL EVERY 8 HOURS PRN
Status: DISCONTINUED | OUTPATIENT
Start: 2022-06-08 | End: 2022-06-09

## 2022-06-08 RX ORDER — SODIUM CHLORIDE, SODIUM LACTATE, POTASSIUM CHLORIDE, CALCIUM CHLORIDE 600; 310; 30; 20 MG/100ML; MG/100ML; MG/100ML; MG/100ML
INJECTION, SOLUTION INTRAVENOUS CONTINUOUS PRN
Status: DISCONTINUED | OUTPATIENT
Start: 2022-06-08 | End: 2022-06-09 | Stop reason: HOSPADM

## 2022-06-08 RX ORDER — MISOPROSTOL 200 UG/1
TABLET ORAL
Status: DISCONTINUED
Start: 2022-06-08 | End: 2022-06-09 | Stop reason: HOSPADM

## 2022-06-08 RX ORDER — OXYTOCIN/0.9 % SODIUM CHLORIDE 30/500 ML
1-24 PLASTIC BAG, INJECTION (ML) INTRAVENOUS CONTINUOUS
Status: DISCONTINUED | OUTPATIENT
Start: 2022-06-08 | End: 2022-06-08

## 2022-06-08 RX ORDER — OXYTOCIN/0.9 % SODIUM CHLORIDE 30/500 ML
1-24 PLASTIC BAG, INJECTION (ML) INTRAVENOUS CONTINUOUS
Status: DISCONTINUED | OUTPATIENT
Start: 2022-06-08 | End: 2022-06-09 | Stop reason: HOSPADM

## 2022-06-08 RX ORDER — LIDOCAINE 40 MG/G
CREAM TOPICAL
Status: DISCONTINUED | OUTPATIENT
Start: 2022-06-08 | End: 2022-06-09 | Stop reason: HOSPADM

## 2022-06-08 RX ADMIN — ACETAMINOPHEN 975 MG: 325 TABLET ORAL at 19:15

## 2022-06-08 RX ADMIN — FENTANYL CITRATE 100 MCG: 50 INJECTION, SOLUTION INTRAMUSCULAR; INTRAVENOUS at 22:20

## 2022-06-08 RX ADMIN — Medication 10 MILLI-UNITS/MIN: at 12:10

## 2022-06-08 RX ADMIN — SODIUM CHLORIDE, POTASSIUM CHLORIDE, SODIUM LACTATE AND CALCIUM CHLORIDE: 600; 310; 30; 20 INJECTION, SOLUTION INTRAVENOUS at 02:44

## 2022-06-08 RX ADMIN — PENICILLIN G 3 MILLION UNITS: 3000000 INJECTION, SOLUTION INTRAVENOUS at 16:00

## 2022-06-08 RX ADMIN — HYDROXYZINE HYDROCHLORIDE 100 MG: 50 TABLET, FILM COATED ORAL at 01:57

## 2022-06-08 RX ADMIN — Medication 1 MILLI-UNITS/MIN: at 02:43

## 2022-06-08 RX ADMIN — PENICILLIN G 3 MILLION UNITS: 3000000 INJECTION, SOLUTION INTRAVENOUS at 10:56

## 2022-06-08 RX ADMIN — SODIUM CHLORIDE, POTASSIUM CHLORIDE, SODIUM LACTATE AND CALCIUM CHLORIDE: 600; 310; 30; 20 INJECTION, SOLUTION INTRAVENOUS at 09:17

## 2022-06-08 RX ADMIN — MORPHINE SULFATE 10 MG: 10 INJECTION INTRAVENOUS at 01:59

## 2022-06-08 RX ADMIN — PENICILLIN G 3 MILLION UNITS: 3000000 INJECTION, SOLUTION INTRAVENOUS at 20:00

## 2022-06-08 RX ADMIN — SODIUM CHLORIDE, POTASSIUM CHLORIDE, SODIUM LACTATE AND CALCIUM CHLORIDE 500 ML: 600; 310; 30; 20 INJECTION, SOLUTION INTRAVENOUS at 21:08

## 2022-06-08 RX ADMIN — PENICILLIN G 3 MILLION UNITS: 3000000 INJECTION, SOLUTION INTRAVENOUS at 06:55

## 2022-06-08 RX ADMIN — PENICILLIN G POTASSIUM 5 MILLION UNITS: 5000000 POWDER, FOR SOLUTION INTRAMUSCULAR; INTRAPLEURAL; INTRATHECAL; INTRAVENOUS at 02:46

## 2022-06-08 ASSESSMENT — ACTIVITIES OF DAILY LIVING (ADL)
ADLS_ACUITY_SCORE: 18

## 2022-06-08 NOTE — PROGRESS NOTES
"Blood pressure 139/83, temperature 98.6  F (37  C), temperature source Oral, resp. rate 20, height 1.778 m (5' 10\"), weight 112.5 kg (248 lb), last menstrual period 09/16/2021, not currently breastfeeding.  Patient Vitals for the past 24 hrs:   BP Temp Temp src Resp Weight   06/08/22 1351 139/83 -- -- 20 --   06/08/22 1336 (!) 164/98 98.6  F (37  C) Oral 20 --   06/08/22 1140 133/70 98.6  F (37  C) Oral 20 --   06/08/22 0930 (!) 152/95 -- -- 20 --   06/08/22 0820 (!) 144/94 98.3  F (36.8  C) Oral 20 112.5 kg (248 lb)   06/08/22 0655 (!) 144/90 98.3  F (36.8  C) Oral 16 --   06/08/22 0350 120/76 97.8  F (36.6  C) Oral 16 --   06/07/22 2315 125/81 98.1  F (36.7  C) Oral 16 --   06/07/22 1946 (!) 142/87 98  F (36.7  C) Oral 18 --   06/07/22 1600 (!) 154/90 98.4  F (36.9  C) Oral 18 --     Notified by RN of severe range BP x 2, 9 minutes apart. Pt is more uncomfortable with since reilly balloon placement. Feeling contractions.  1336- 164/98  1345- 161/96  Repeated at 15 minutes per protocol- 1351: 139/83    No HA, vision changes, ruq/epigastric pain  CONTRACTIONS: every 2-5 minutes  Pitocin- 10 mu/min.,  Antibiotics- none  FETAL HEART TONES: continuous EFM- baseline 130 with moderate variability and positive accelerations. No decelerations.  ROM: not ruptured  PELVIC EXAM: reilly balloon in place    ASSESSMENT:  ==============  IUP @ 37w6d for induction of labor.  Indication: GHTN   Fetal Heart Rate Tracing category one  GBS- negative    Severe range BP x 2, NOT sustained    Reilly balloon in place  Pitocin @ 10mu  S/p pv miso x 2  Patient Active Problem List   Diagnosis     Vitamin D deficiency     Iron deficiency anemia secondary to inadequate dietary iron intake     HRP (high risk pregnancy), third trimester     Gestational hypertension     Vaginal bleeding     Fetal arrhythmia affecting pregnancy, antepartum     Elevated blood pressure affecting pregnancy in third trimester, antepartum     PUPPP (pruritic urticarial " papules and plaques of pregnancy)     Labor and delivery, indication for care     PLAN:  ===========  Severe range BP x 2 but not sustained- Repeat at 15 minutes  WNL per protocol.   Repeat pre-eclampsia labs ordered.  Continue close BP monitoring. If sustained severe range BP, will need anti-hypertensive treatment, initiation of magnesium sulfate and transfer to OB MD team.     Rob Evangelista, JAQUI, CNM

## 2022-06-08 NOTE — PROGRESS NOTES
"Labor progress note    S:  Just before PV miso dose @4 was due patient began feeling more uncomfortable with contractions. Contractions difficult to trace due to abdominal characteristics but palpated moderate approximately every 5 minutes.     O:  Blood pressure 125/81, temperature 98.1  F (36.7  C), temperature source Oral, resp. rate 16, height 1.778 m (5' 10\"), weight 111.9 kg (246 lb 9.6 oz), last menstrual period 09/16/2021, not currently breastfeeding.  General appearance: uncomfortable with contractions.  Contractions: Every 1.5-4 minutes. 60-70 seconds duration.    FHT: Baseline 130 with moderate variability. Accelerations present. No decelerations present.  ROM: not ruptured.   Pelvic exam: 2/ 50%/ Mid/ average/ -3  Gerard Score: 5  -------------------------------  Pitocin- to start,  Antibiotics- PCN    A:  IUP @ 37w6d IOL gestational hypertension   Fetal Heart rate tracing Category one  GBS- positive  S/p PV miso x3 doses  Patient Active Problem List   Diagnosis     Vitamin D deficiency     Iron deficiency anemia secondary to inadequate dietary iron intake     HRP (high risk pregnancy), third trimester     Gestational hypertension     Vaginal bleeding     Fetal arrhythmia affecting pregnancy, antepartum     Elevated blood pressure affecting pregnancy in third trimester, antepartum     PUPPP (pruritic urticarial papules and plaques of pregnancy)     Labor and delivery, indication for care         P:  comfort measures prn    Plan to start low dose pitocin to continue labor induction. Orders placed Discussed risks/benefits of pitocin and patient is agreeable. She is coping well with contractions. Requesting vistaril to aid with rest.   Aware she can have nitrous oxide at any time.   Plan to start PCN now also.     JAQUI Rubio CNM    "

## 2022-06-08 NOTE — PROGRESS NOTES
"Labor progress note    S:  Resting    O:  Blood pressure 120/76, temperature 97.8  F (36.6  C), temperature source Oral, resp. rate 16, height 1.778 m (5' 10\"), weight 111.9 kg (246 lb 9.6 oz), last menstrual period 09/16/2021, not currently breastfeeding.  General appearance: comfortable.  Contractions: Every 2-5 minutes.  seconds duration.    FHT: Baseline 120 with minimal variability from 0069-6797, now moderate. Accelerations absent x 1 hour. No decelerations present.  ROM: not ruptured.   Pelvic exam: deferred   -------------------------------  Pitocin- 2 mu/min.,  Antibiotics- PCN      A:  IUP @ 37w6d IOL gestational hypertension   Fetal Heart rate tracing Category one now. Was Category II [id number], with minimal variability, no decelerations.   GBS- positive  S/p PV miso x3 doses  Patient Active Problem List   Diagnosis     Vitamin D deficiency     Iron deficiency anemia secondary to inadequate dietary iron intake     HRP (high risk pregnancy), third trimester     Gestational hypertension     Vaginal bleeding     Fetal arrhythmia affecting pregnancy, antepartum     Elevated blood pressure affecting pregnancy in third trimester, antepartum     PUPPP (pruritic urticarial papules and plaques of pregnancy)     Labor and delivery, indication for care         P:  500cc fluid bolus now   Encourage position changes  Continue to titrate pitocin per facility protocol    JAQUI Rubio CNM    "

## 2022-06-08 NOTE — PROGRESS NOTES
"Blood pressure 139/83, temperature 98.6  F (37  C), temperature source Oral, resp. rate 20, height 1.778 m (5' 10\"), weight 112.5 kg (248 lb), last menstrual period 09/16/2021, not currently breastfeeding.  Patient Vitals for the past 24 hrs:   BP Temp Temp src Resp Weight   06/08/22 1351 139/83 -- -- 20 --   06/08/22 1336 (!) 164/98 98.6  F (37  C) Oral 20 --   06/08/22 1140 133/70 98.6  F (37  C) Oral 20 --   06/08/22 0930 (!) 152/95 -- -- 20 --   06/08/22 0820 (!) 144/94 98.3  F (36.8  C) Oral 20 112.5 kg (248 lb)   06/08/22 0655 (!) 144/90 98.3  F (36.8  C) Oral 16 --   06/08/22 0350 120/76 97.8  F (36.6  C) Oral 16 --   06/07/22 2315 125/81 98.1  F (36.7  C) Oral 16 --   06/07/22 1946 (!) 142/87 98  F (36.7  C) Oral 18 --     General appearance: Has been having more painful contractions. Labored in the tub for a significant amount of time. Just assisted out of tub by RN.   Repeat blood pressure has been WNL.   CONTRACTIONS: every 2-4 minutes  Pitocin- 10 mu/min.,  Antibiotics- none  FETAL HEART TONES: continuous EFM- baseline 145 with moderate variability and positive accelerations. No decelerations.  ROM: not ruptured  PELVIC EXAM: reilly balloon remains in place with gentle traction.    # Pain Assessment:  Painful contractions, hydrotherapy    ASSESSMENT:  ==============  IUP @ 37w6d for induction of labor.  Indication: GHTN   Fetal Heart Rate Tracing category one  GBS- negative     Reilly balloon in place    Pitocin @ 10mu  S/p pv miso x 2    Patient Active Problem List   Diagnosis     Vitamin D deficiency     Iron deficiency anemia secondary to inadequate dietary iron intake     HRP (high risk pregnancy), third trimester     Gestational hypertension     Vaginal bleeding     Fetal arrhythmia affecting pregnancy, antepartum     Elevated blood pressure affecting pregnancy in third trimester, antepartum     PUPPP (pruritic urticarial papules and plaques of pregnancy)     Labor and delivery, indication for care "     PLAN:  ===========  Ambulation, hydration, position changes, birthing ball/sling and tub options to facilitate labor.   Continue pitocin. Reilly balloon in place.   Pre-eclampsia labs WNL.  Continue close BP monitoring.    JAQUI Wilhelm CNM    Addendum @ 1810:    Entered patient's room. Pt currently sound asleep in right side lying position. Per RN, reilly balloon remains in place. Pt has support person at bedside.    Report to be given to ARCELIA Aguilera at 1830.    JAQUI Wilhelm CNM

## 2022-06-08 NOTE — PROGRESS NOTES
"Blood pressure 133/70, temperature 98.6  F (37  C), temperature source Oral, resp. rate 20, height 1.778 m (5' 10\"), weight 112.5 kg (248 lb), last menstrual period 09/16/2021, not currently breastfeeding.  Patient Vitals for the past 24 hrs:   BP Temp Temp src Resp Weight   06/08/22 1140 133/70 98.6  F (37  C) Oral 20 --   06/08/22 0930 (!) 152/95 -- -- 20 --   06/08/22 0820 (!) 144/94 98.3  F (36.8  C) Oral 20 112.5 kg (248 lb)   06/08/22 0655 (!) 144/90 98.3  F (36.8  C) Oral 16 --   06/08/22 0350 120/76 97.8  F (36.6  C) Oral 16 --   06/07/22 2315 125/81 98.1  F (36.7  C) Oral 16 --   06/07/22 1946 (!) 142/87 98  F (36.7  C) Oral 18 --   06/07/22 1600 (!) 154/90 98.4  F (36.9  C) Oral 18 --     General appearance: Pt has been resting off and on through the morning. Not feeling significant contractions.  CONTRACTIONS: every 3-6 minutes  Pitocin- 5 mu/min.,  Antibiotics- none  FETAL HEART TONES: continuous EFM- baseline 125 with moderate variability and positive accelerations. No decelerations.  ROM: not ruptured  PELVIC EXAM: deferred    # Pain Assessment:  Current Pain Score 6/8/2022   Patient currently in pain? denies   Pain location -   Pain descriptors -   Vandana benton pain level was assessed and she currently denies pain.        ASSESSMENT:  ==============  IUP @ 37w6d for induction of labor.  Indication: GHTN   Fetal Heart Rate Tracing category one  GBS- negative    Pitocin @ 5mu    S/p pv miso x 3    GHTN- no severe range bps, asymptomatic    Patient Active Problem List   Diagnosis     Vitamin D deficiency     Iron deficiency anemia secondary to inadequate dietary iron intake     HRP (high risk pregnancy), third trimester     Gestational hypertension     Vaginal bleeding     Fetal arrhythmia affecting pregnancy, antepartum     Elevated blood pressure affecting pregnancy in third trimester, antepartum     PUPPP (pruritic urticarial papules and plaques of pregnancy)     Labor and delivery, indication for care "     PLAN:  ===========  Continue pitocin titration to adequate contraction pattern.  Reevaluate in 2-4 hours or prn per maternal/fetal indications.    JAQUI Wilhelm, BAILEEM

## 2022-06-08 NOTE — PROGRESS NOTES
"Labor progress note    S:  Resting per RN. Not feeling any contractions.     O:  Blood pressure 125/81, temperature 98.1  F (36.7  C), temperature source Oral, resp. rate 16, height 1.778 m (5' 10\"), weight 111.9 kg (246 lb 9.6 oz), last menstrual period 09/16/2021, not currently breastfeeding.  Contractions: rare, not felt by patient  FHT: Baseline 135 with moderate variability. Accelerations present. No decelerations traced. Intermittent prolonged accelerations. Intermittent incomplete FHR tracing due to abdominal characteristics and maternal position changes.   ROM: not ruptured.   Pelvic exam:deferred   Gerard Score:  -------------------------------  Pitocin- none,  Antibiotics- to start PCN with pitocin or active labor.     A:  IUP @ 37w5d IOL for gestational hypertension.    Fetal Heart rate tracing Category one when traced.   GBS- positive  Patient Active Problem List   Diagnosis     Vitamin D deficiency     Iron deficiency anemia secondary to inadequate dietary iron intake     HRP (high risk pregnancy), third trimester     Gestational hypertension     Vaginal bleeding     Fetal arrhythmia affecting pregnancy, antepartum     Elevated blood pressure affecting pregnancy in third trimester, antepartum     PUPPP (pruritic urticarial papules and plaques of pregnancy)     Labor and delivery, indication for care         P:  comfort measures prn   Labor induction continues with cytotec. Plan for PV miso #4 when due around 0000    JAQUI Rubio CNM      "

## 2022-06-08 NOTE — PLAN OF CARE
Data: Maternal status VSS except for one elevated B/P this morning. Afebrile. Denies leaking of fluids, vaginal bleeding, and painful contractions. Patient was switched from vaginal misoprostol to pitocin and penicillin was started. See flow sheets for details on FHR tracings and uterine activity. Signs and symptoms of infection not present.   Response: Patient requested sleep medications and was given morphine and atarax and rested well through the night.   Plan: Continue expectant management.

## 2022-06-08 NOTE — PROGRESS NOTES
"Blood pressure 139/83, temperature 98.6  F (37  C), temperature source Oral, resp. rate 20, height 1.778 m (5' 10\"), weight 112.5 kg (248 lb), last menstrual period 09/16/2021, not currently breastfeeding.  Patient Vitals for the past 24 hrs:   BP Temp Temp src Resp Weight   06/08/22 1351 139/83 -- -- 20 --   06/08/22 1336 (!) 164/98 98.6  F (37  C) Oral 20 --   06/08/22 1140 133/70 98.6  F (37  C) Oral 20 --   06/08/22 0930 (!) 152/95 -- -- 20 --   06/08/22 0820 (!) 144/94 98.3  F (36.8  C) Oral 20 112.5 kg (248 lb)   06/08/22 0655 (!) 144/90 98.3  F (36.8  C) Oral 16 --   06/08/22 0350 120/76 97.8  F (36.6  C) Oral 16 --   06/07/22 2315 125/81 98.1  F (36.7  C) Oral 16 --   06/07/22 1946 (!) 142/87 98  F (36.7  C) Oral 18 --   06/07/22 1600 (!) 154/90 98.4  F (36.9  C) Oral 18 --     General appearance: Pt agreeable to reilly balloon placement. Has been feeling uncomfortable cramping since cervical exam.  CONTRACTIONS: every 2-4 minutes  Pitocin- 10 mu/min.,  Antibiotics- none  FETAL HEART TONES: continuous EFM- baseline 130 with moderate variability and positive accelerations. No decelerations.  ROM: not ruptured  PELVIC EXAM: Reilly balloon placed manually by CNM without difficulty, inflated with sterile water to 75ml, correct placement confirmed, gentle traction applied    # Pain Assessment:  Cramping, pressure    ASSESSMENT:  ==============  IUP @ 37w6d for induction of labor.  Indication: GHTN   Fetal Heart Rate Tracing category one  GBS- negative    Reilly balloon @ 1300  Pitocin @ 10mu    S/p pv miso x 3    Patient Active Problem List   Diagnosis     Vitamin D deficiency     Iron deficiency anemia secondary to inadequate dietary iron intake     HRP (high risk pregnancy), third trimester     Gestational hypertension     Vaginal bleeding     Fetal arrhythmia affecting pregnancy, antepartum     Elevated blood pressure affecting pregnancy in third trimester, antepartum     PUPPP (pruritic urticarial papules and " plaques of pregnancy)     Labor and delivery, indication for care     PLAN:  ===========  Ledesma balloon placed with pt consent and gentle traction applied.  Continue pitocin titration to adequate contraction pattern.  Monitor BPs and for s/s of pre-e closely.   Reevaluate prn per maternal/fetal indications.     JAQUI Wilhelm, CNM

## 2022-06-08 NOTE — PROGRESS NOTES
"Labor progress note    S:  Resting. Feeling some intermittent cramping. She does not have anyone here for support. Her mother is watching her other children.     O:  Blood pressure (!) 154/90, temperature 98.4  F (36.9  C), temperature source Oral, resp. rate 18, height 1.778 m (5' 10\"), weight 111.9 kg (246 lb 9.6 oz), last menstrual period 09/16/2021, not currently breastfeeding.  General appearance: comfortable.  Contractions: Irregular with intermittent irritability traced.   FHT: Baseline 130 with moderate variability. Accelerations present. No decelerations traced. Intermittent broken tracing due to maternal positioning and abdominal characteristics.  ROM: not ruptured.   Pelvic exam: 1/ 40%/ Mid/ average/ -3  Gerard Score: 4  -------------------------------  Pitocin- none,  Antibiotics- none, plan to start PCN with pitocin or active labor      A:  IUP @ 37w5d IOL for gestational hypertension   Fetal Heart rate tracing Category one  GBS- positive  Patient Active Problem List   Diagnosis     Vitamin D deficiency     Iron deficiency anemia secondary to inadequate dietary iron intake     HRP (high risk pregnancy), third trimester     Gestational hypertension     Vaginal bleeding     Fetal arrhythmia affecting pregnancy, antepartum     Elevated blood pressure affecting pregnancy in third trimester, antepartum     PUPPP (pruritic urticarial papules and plaques of pregnancy)     Labor and delivery, indication for care         P:  comfort measures prn   Theraputic sleep meds discussed and ordered. She will likely try vistaril.   Discussed/offered option of reilly balloon for mechanical ripening along with PV miso. Patient desires to just continue with PV miso only and consider reilly bulb with next exam.   Labor induction with misoprostol. Plan dose #3 at 1930 when due.  Reviewed options for coping with labor. She plans to try nitrous oxide.      JAQUI Rubio CNM    "

## 2022-06-08 NOTE — PROGRESS NOTES
"Blood pressure 133/70, temperature 98.6  F (37  C), temperature source Oral, resp. rate 20, height 1.778 m (5' 10\"), weight 112.5 kg (248 lb), last menstrual period 09/16/2021, not currently breastfeeding.  Patient Vitals for the past 24 hrs:   BP Temp Temp src Resp Weight   06/08/22 1140 133/70 98.6  F (37  C) Oral 20 --   06/08/22 0930 (!) 152/95 -- -- 20 --   06/08/22 0820 (!) 144/94 98.3  F (36.8  C) Oral 20 112.5 kg (248 lb)   06/08/22 0655 (!) 144/90 98.3  F (36.8  C) Oral 16 --   06/08/22 0350 120/76 97.8  F (36.6  C) Oral 16 --   06/07/22 2315 125/81 98.1  F (36.7  C) Oral 16 --   06/07/22 1946 (!) 142/87 98  F (36.7  C) Oral 18 --   06/07/22 1600 (!) 154/90 98.4  F (36.9  C) Oral 18 --     General appearance: Reports that she is still feeling drowsy from the morphine/vistaril. Has been trying to rest; notes that she does not really feel contractions.  CONTRACTIONS: every 2-4 minutes  Pitocin- 8 mu/min.,  Antibiotics- none  FETAL HEART TONES: continuous EFM- baseline 130 with moderate variability and positive accelerations. No decelerations.  ROM: not ruptured  PELVIC EXAM: 2/40/-3, mid/firm; internal os firm, external os softening; head applied to internal os    Mendez 3    # Pain Assessment:  Current Pain Score 6/8/2022   Patient currently in pain? denies   Pain location -   Pain descriptors -   Vandana benton pain level was assessed and she currently denies pain.        ASSESSMENT:  ==============  IUP @ 37w6d for induction of labor.  Indication: GHTN   Fetal Heart Rate Tracing category one  GBS- negative    Cervix unchanged- 2/40/-3, mid/firm; mendez 3    Pitocin @ 8mu    S/p PV miso x 3    GHTN- no severe range bps, asymptomatic    Patient Active Problem List   Diagnosis     Vitamin D deficiency     Iron deficiency anemia secondary to inadequate dietary iron intake     HRP (high risk pregnancy), third trimester     Gestational hypertension     Vaginal bleeding     Fetal arrhythmia affecting pregnancy, " antepartum     Elevated blood pressure affecting pregnancy in third trimester, antepartum     PUPPP (pruritic urticarial papules and plaques of pregnancy)     Labor and delivery, indication for care     PLAN:  ===========  Reviewed cervical exam and plan of care.   Discussed pitocin was started as pt was jayleen too frequently for additional doses of misoprostol.  Pt previously declined reilly balloon placement.  Strongly recommended reilly balloon placement at this time. Reviewed rationale, mechanism of use, r/b/a.  Pt agreeable.  Will gather supplies and return to place.  Pitocin orders changed to increase by 2mu as appropriate.   Monitor BPs and for s/s of pre-e closely.     JAQUI Wilhelm, ABILEEM

## 2022-06-09 LAB
ABO/RH(D): NORMAL
FBST KIT EXP: NORMAL
FBST LOT #: NORMAL
FETAL BLEED SCREEN: NORMAL
HGB BLD-MCNC: 11.7 G/DL (ref 11.7–15.7)
SPECIMEN EXPIRATION DATE: NORMAL

## 2022-06-09 PROCEDURE — 258N000003 HC RX IP 258 OP 636: Performed by: REGISTERED NURSE

## 2022-06-09 PROCEDURE — 36415 COLL VENOUS BLD VENIPUNCTURE: CPT | Performed by: MIDWIFE

## 2022-06-09 PROCEDURE — 250N000009 HC RX 250: Performed by: ADVANCED PRACTICE MIDWIFE

## 2022-06-09 PROCEDURE — 250N000013 HC RX MED GY IP 250 OP 250 PS 637: Performed by: MIDWIFE

## 2022-06-09 PROCEDURE — 86901 BLOOD TYPING SEROLOGIC RH(D): CPT | Performed by: MIDWIFE

## 2022-06-09 PROCEDURE — 250N000013 HC RX MED GY IP 250 OP 250 PS 637: Performed by: ADVANCED PRACTICE MIDWIFE

## 2022-06-09 PROCEDURE — 120N000002 HC R&B MED SURG/OB UMMC

## 2022-06-09 PROCEDURE — 250N000011 HC RX IP 250 OP 636: Performed by: MIDWIFE

## 2022-06-09 PROCEDURE — 85018 HEMOGLOBIN: CPT | Performed by: MIDWIFE

## 2022-06-09 PROCEDURE — 722N000001 HC LABOR CARE VAGINAL DELIVERY SINGLE

## 2022-06-09 RX ORDER — DOCUSATE SODIUM 100 MG/1
100 CAPSULE, LIQUID FILLED ORAL DAILY
Status: DISCONTINUED | OUTPATIENT
Start: 2022-06-09 | End: 2022-06-10 | Stop reason: HOSPADM

## 2022-06-09 RX ORDER — MISOPROSTOL 200 UG/1
400 TABLET ORAL
Status: DISCONTINUED | OUTPATIENT
Start: 2022-06-09 | End: 2022-06-10 | Stop reason: HOSPADM

## 2022-06-09 RX ORDER — OXYTOCIN/0.9 % SODIUM CHLORIDE 30/500 ML
340 PLASTIC BAG, INJECTION (ML) INTRAVENOUS CONTINUOUS PRN
Status: DISCONTINUED | OUTPATIENT
Start: 2022-06-09 | End: 2022-06-10 | Stop reason: HOSPADM

## 2022-06-09 RX ORDER — METHYLERGONOVINE MALEATE 0.2 MG/ML
200 INJECTION INTRAVENOUS
Status: DISCONTINUED | OUTPATIENT
Start: 2022-06-09 | End: 2022-06-10 | Stop reason: HOSPADM

## 2022-06-09 RX ORDER — IBUPROFEN 800 MG/1
800 TABLET, FILM COATED ORAL EVERY 6 HOURS PRN
Status: DISCONTINUED | OUTPATIENT
Start: 2022-06-09 | End: 2022-06-10 | Stop reason: HOSPADM

## 2022-06-09 RX ORDER — ACETAMINOPHEN 325 MG/1
650 TABLET ORAL EVERY 6 HOURS PRN
Qty: 100 TABLET | Refills: 0 | Status: SHIPPED | OUTPATIENT
Start: 2022-06-09

## 2022-06-09 RX ORDER — IBUPROFEN 600 MG/1
600 TABLET, FILM COATED ORAL EVERY 6 HOURS PRN
Qty: 60 TABLET | Refills: 0 | Status: SHIPPED | OUTPATIENT
Start: 2022-06-09

## 2022-06-09 RX ORDER — BISACODYL 10 MG
10 SUPPOSITORY, RECTAL RECTAL DAILY PRN
Status: DISCONTINUED | OUTPATIENT
Start: 2022-06-09 | End: 2022-06-10 | Stop reason: HOSPADM

## 2022-06-09 RX ORDER — AMOXICILLIN 250 MG
1 CAPSULE ORAL DAILY
Qty: 100 TABLET | Refills: 0 | Status: SHIPPED | OUTPATIENT
Start: 2022-06-09

## 2022-06-09 RX ORDER — DIPHENHYDRAMINE HCL 25 MG
25-50 CAPSULE ORAL
Status: DISCONTINUED | OUTPATIENT
Start: 2022-06-09 | End: 2022-06-10 | Stop reason: HOSPADM

## 2022-06-09 RX ORDER — HYDROCORTISONE 25 MG/G
CREAM TOPICAL 3 TIMES DAILY PRN
Status: DISCONTINUED | OUTPATIENT
Start: 2022-06-09 | End: 2022-06-10 | Stop reason: HOSPADM

## 2022-06-09 RX ORDER — NIFEDIPINE 30 MG/1
30 TABLET, EXTENDED RELEASE ORAL DAILY
Status: DISCONTINUED | OUTPATIENT
Start: 2022-06-09 | End: 2022-06-10 | Stop reason: HOSPADM

## 2022-06-09 RX ORDER — CARBOPROST TROMETHAMINE 250 UG/ML
250 INJECTION, SOLUTION INTRAMUSCULAR
Status: DISCONTINUED | OUTPATIENT
Start: 2022-06-09 | End: 2022-06-10 | Stop reason: HOSPADM

## 2022-06-09 RX ORDER — MODIFIED LANOLIN
OINTMENT (GRAM) TOPICAL
Status: DISCONTINUED | OUTPATIENT
Start: 2022-06-09 | End: 2022-06-10 | Stop reason: HOSPADM

## 2022-06-09 RX ORDER — ACETAMINOPHEN 325 MG/1
650 TABLET ORAL EVERY 4 HOURS PRN
Status: DISCONTINUED | OUTPATIENT
Start: 2022-06-09 | End: 2022-06-10 | Stop reason: HOSPADM

## 2022-06-09 RX ORDER — TRANEXAMIC ACID 10 MG/ML
1 INJECTION, SOLUTION INTRAVENOUS EVERY 30 MIN PRN
Status: DISCONTINUED | OUTPATIENT
Start: 2022-06-09 | End: 2022-06-10 | Stop reason: HOSPADM

## 2022-06-09 RX ORDER — OXYTOCIN 10 [USP'U]/ML
10 INJECTION, SOLUTION INTRAMUSCULAR; INTRAVENOUS
Status: DISCONTINUED | OUTPATIENT
Start: 2022-06-09 | End: 2022-06-10 | Stop reason: HOSPADM

## 2022-06-09 RX ORDER — ALBUTEROL SULFATE 90 UG/1
2 AEROSOL, METERED RESPIRATORY (INHALATION) EVERY 6 HOURS PRN
Status: DISCONTINUED | OUTPATIENT
Start: 2022-06-09 | End: 2022-06-10 | Stop reason: HOSPADM

## 2022-06-09 RX ORDER — MISOPROSTOL 200 UG/1
800 TABLET ORAL
Status: DISCONTINUED | OUTPATIENT
Start: 2022-06-09 | End: 2022-06-10 | Stop reason: HOSPADM

## 2022-06-09 RX ORDER — CETIRIZINE HYDROCHLORIDE 5 MG/1
10 TABLET ORAL DAILY
Status: DISCONTINUED | OUTPATIENT
Start: 2022-06-09 | End: 2022-06-10 | Stop reason: HOSPADM

## 2022-06-09 RX ADMIN — IBUPROFEN 800 MG: 800 TABLET ORAL at 07:40

## 2022-06-09 RX ADMIN — ACETAMINOPHEN 975 MG: 325 TABLET ORAL at 01:17

## 2022-06-09 RX ADMIN — IBUPROFEN 800 MG: 800 TABLET, FILM COATED ORAL at 01:17

## 2022-06-09 RX ADMIN — HUMAN RHO(D) IMMUNE GLOBULIN 300 MCG: 1500 SOLUTION INTRAMUSCULAR; INTRAVENOUS at 11:38

## 2022-06-09 RX ADMIN — ACETAMINOPHEN 325MG 650 MG: 325 TABLET ORAL at 11:55

## 2022-06-09 RX ADMIN — DOCUSATE SODIUM 100 MG: 100 CAPSULE, LIQUID FILLED ORAL at 07:40

## 2022-06-09 RX ADMIN — SODIUM CHLORIDE, POTASSIUM CHLORIDE, SODIUM LACTATE AND CALCIUM CHLORIDE: 600; 310; 30; 20 INJECTION, SOLUTION INTRAVENOUS at 00:42

## 2022-06-09 RX ADMIN — ACETAMINOPHEN 325MG 650 MG: 325 TABLET ORAL at 07:40

## 2022-06-09 RX ADMIN — Medication 100 ML/HR: at 01:17

## 2022-06-09 RX ADMIN — CETIRIZINE HYDROCHLORIDE 10 MG: 5 TABLET ORAL at 20:26

## 2022-06-09 RX ADMIN — IBUPROFEN 800 MG: 800 TABLET ORAL at 13:55

## 2022-06-09 RX ADMIN — NIFEDIPINE 30 MG: 30 TABLET, FILM COATED, EXTENDED RELEASE ORAL at 11:38

## 2022-06-09 ASSESSMENT — ACTIVITIES OF DAILY LIVING (ADL)
ADLS_ACUITY_SCORE: 18

## 2022-06-09 NOTE — PLAN OF CARE
Vaginal Delivery Note   of viable Female with CNM and delivery nurse in attendance.  NICU/Nursery RN present.  Infant with spontaneous cry, to mother's abdomen, dried and stimulated. Placenta delivered with out complication, Pitocin infusion after baby was delivered, no laceration, aubrey cares provided.  Mother and baby in stable condition.

## 2022-06-09 NOTE — PLAN OF CARE
Goal Outcome Evaluation:    Plan of Care Reviewed With: patient     Overall Patient Progress: improving    BP elevated in 140s/100s throughout shift, provider notified and 30mg nifedipine given, all other VSS. Denies headache, vision changes, RUQ pain, worsening edema, chest pain, and SOB. Fundus midline, firm, and U/U. Lochia scant. Pain adequately managed with tylenol and ibuprofen. Voiding without difficulty. Breastfeeding with minimal assist for latching. Hospital breast pump brought to room and instructions reviewed, encouraged to pump after every other feeding. Bonding well with . Continue with plan of care.

## 2022-06-09 NOTE — L&D DELIVERY NOTE
Delivery Summary    Vandana Lagos MRN# [id number]   Age: 26 year old YOB: 1995   Delivery Note    Brief course of labor: pt admitted 22 mendez of 2, 4 vag miso, reilly balloon and IV pit AROM    Pt received 1 dose IV fentanyl 100 mcg     Pt became complete at 2230 and started pushing 2230. Delivered a vigorous baby girl  who was immediately placed on mom's abdomen. Umbilical cord was double clamped and cut  after the cord stopped pulsing.  Placenta spontaneously delivered intact without difficulty via Shultze mechanism. Intact perineum .  Fundus is firm and midline.  Mom and baby are stable. Nicu present for birth due to variable decels      IUP at 37 weeks gestation delivered on 2022.     delivery of a viable Female infant.  Weight : pending  Apgars of 9 at 1 minute and 9 at 5 minutes.  Labor was induced.  Medications administered  in labor:  Pain Rx sedative  and narcotics ; Antibiotics Yes:   Perineum: Intact  Placenta-mechanism: spontaneous, intact,  with a 3 vessel cord. IV oxytocin was given.  QBL was 100.  Anticipated Discharge Date: 6/10/22  Complications of pregnancy, labor and delivery: GHTN, cat 2 tracing IOL   Birth attendants:JAQUI Wisdom CNM, ARCELIA NAILS  ASSESSMENT & PLAN: routine PP care        Radha Lagos-Vandana [[id number]]    Labor Event Times    Labor onset date: 22 Onset time:  6:30 PM   Dilation complete date: 22 Complete time: 10:30 PM   Start pushing date/time: 2022 2231      Labor Length    1st Stage (hrs): 4 (min): 0   2nd Stage (hrs): 0 (min): 3   3rd Stage (hrs): 0 (min): 5      Labor Events     labor?: No   steroids: None  Labor Type: Induction/Cervical ripening  Predominate monitoring during 1st stage: continuous electronic fetal monitoring     Antibiotics received during labor?: Yes  Reason for Antibiotics: GBS  Antibiotics received for GBS: Penicillin  Antibiotics Given (GBS): Greater than 4 hours prior to  delivery     Rupture identifier: Sac 1  Rupture date/time: 22   Rupture type: Artificial Rupture of Membranes  Fluid color: Clear  Fluid odor: Normal     Induction: Misoprostol, Oxytocin, Mechanical ripening agent  Induction date/time:     Cervical ripening date/time: 22 1000   Indications for induction: Hypertension     Augmentation: AROM  1:1 continuous labor support provided by?: RN, provider       Delivery/Placenta Date and Time    Delivery Date: 22 Delivery Time: 10:33 PM   Placenta Date/Time: 2022 10:38 PM  Oxytocin given at the time of delivery: after delivery of baby  Delivering clinician: Tracy Aguilera APRN CNM   Other personnel present at delivery:  Provider Role   Nathan Lowry RN Delivery Nurse   Hellen Morales RN Delivery Assist         Vaginal Counts     Initial count performed by 2 team members:  Two Team Members   Susy Dhaliwal CNM, RN       Needles Suture Needles Sponges (RETIRED) Instruments   Initial counts 2 0 5    Added to count 0 0 0    Relief counts 0 0 0    Final counts 2 0 5          Placed during labor Accounted for at the end of labor   FSE Yes Yes   IUPC No NA   Cervidil No NA              Final count performed by 2 team members:  Two Team Members   Susy Dhaliwal. ARCELIA Lowry RN      Final count correct?: Yes     Apgars    Living status: Living   1 Minute 5 Minute 10 Minute 15 Minute 20 Minute   Skin color: 1  1       Heart rate: 2  2       Reflex irritability: 2  2       Muscle tone: 2  2       Respiratory effort: 2  2       Total: 9  9       Apgars assigned by: HELLEN MORALES RN     Cord    Vessels: 3 Vessels    Cord Complications: None               Cord Blood Disposition: Lab    Gases Sent?: No    Delayed cord clamping?: Yes    Cord Clamping Delay (seconds): >120 seconds    Stem cell collection?: No        Resuscitation    Methods: None  [place] Care at Delivery: female infant born with spontaneous cry, placed on mothers  abdomen, delayed cord clamping. Stimulated,dried, and placed on mothers chest, warm blankets and hat applied.     Skin to Skin and Feeding Plan    Skin to skin initiation date/time: 1/6/1841    Skin to skin with: Mother  Skin to skin end date/time:        Labor Events and Shoulder Dystocia    Fetal Tracing Prior to Delivery: Category 2  Shoulder dystocia present?: Neg     Delivery (Maternal) (Provider to Complete) (430345)    Episiotomy: None  Perineal lacerations: None    Repair suture: None  Genital tract inspection done: Pos     Blood Loss  Mother: Vandana Lagos #[id number]   Start of Mother's Information    Delivery Blood Loss  06/08/22 1830 - 06/08/22 2315    Delivery QBL (mL) Hospital Encounter 100 mL    Total  100 mL         End of Mother's Information  Mother: Vandana Lagos #[id number]          Delivery - Provider to Complete ([id number])    Delivering clinician: Tracy Aguilera APRN CNM  CNM Care: Exclusive CNM care in labor  Attempted Delivery Types (Choose all that apply): Spontaneous Vaginal Delivery  Delivery Type (Choose the 1 that will go to the Birth History): Vaginal, Spontaneous                   Other personnel:  Provider Role   Nathan Lowry, ROCKY Delivery Nurse   Hellen Tamez RN Delivery Assist                Placenta    Date/Time: 6/8/2022 10:38 PM  Removal: Spontaneous  Disposition: Hospital disposal           Anesthesia    Method: INTRAVENOUS                 Presentation and Position    Presentation: Vertex    Position: Left Occiput Anterior                 JAQUI Wisdom CNM

## 2022-06-09 NOTE — PROGRESS NOTES
S: I was at the bedside with pt and RN  diffficulty picking up FHR with EFM while pt standing at bedside contractions strong  Pt continues to decline pain med  Readjusting EFM audible variable noted at   Pt consented to IFSE at  with audible variables difficult to trace on EFM  Now  persistent category II fetal heart rate tracing for 25 minutes.  Strip reviewed at bedside.      O: Baseline rate 145, normal  Variability moderate  Accelerations present  Decelerations present, deceleration type: variable, deceleration frequency: recurrent  Recurrent variable decels began at    Assessment: EFM interpretation suggests absence of concern for fetal metabolic acidemia at this time due to moderate variability    Uterine Activity normal/regular.    IV pit at 12 mu now reduced to 6 mu    The interventions currently taken to improve the fetal heart rate tracing and fetal oxygenation are: maternal positioning, IV fluid bolus , decrease oxytocin, increase frequency of assessment, consult Category 2 algorithm and Dr Solis at desk aware and monitoring  Dr Colón in OR     A: Persistent category II tracing.    P: Per the category II algorithm, the plan is to continue observe/conservative management. Reevaluate in 30 minutes.   Reviewed concerns over FHR variable decels and cat 2 tracing with mom and pt mother at bedside discussed if remote from delivery and continued cat 2 tracing a  may be recommended  Pt mom said no .   Will monitor closely  Update MD dorian NAILS

## 2022-06-09 NOTE — PROVIDER NOTIFICATION
06/09/22 1300   Provider Notification   Provider Name/Title ARCELIA Schultz   Method of Notification Electronic Page   Request Evaluate-Remote   Notification Reason Vital Signs Change   /103 at 1233. Also noted skipped beat appx every 5th beat while listening to apical pulse.

## 2022-06-09 NOTE — PROGRESS NOTES
S: I was at the bedside pt is uncomfortable with strong frequent contractions  Has continued to decline pain relief.  Strip reviewed at bedside.  I came to the bedside to review with the RN.   Dr Solis monitoring on unit  Dr Colón on unit now reviewing FHR tracing   O: Baseline rate 145, normal  Variability moderate  Accelerations present  Decelerations present intermitted variable  Improved [id number]     Assessment: EFM interpretation suggests absence of concern for fetal metabolic acidemia at this time due to moderate variability    Uterine Activity normal/regular.   The interventions currently taken to improve the fetal heart rate tracing and fetal oxygenation are: maternal positioning, IV fluid bolus , decrease oxytocin, increase frequency of assessment, consult Category 2 algorithm, evaluate labor progress and emotional support    A:  category II tracing improving from recurrent variables to intermittent with moderate variability .    P: Per the category II algorithm, the plan is to continue observe/conservative management.   Dr Colón aware and reviewed FHR tracing   Susy Aguilera CNM APRN .

## 2022-06-09 NOTE — PLAN OF CARE
Problem: Plan of Care - These are the overarching goals to be used throughout the patient stay.    Goal: Plan of Care Review/Shift Note  Outcome: Ongoing, Progressing  Flowsheets (Taken 6/9/2022 0528)  Plan of Care Reviewed With: patient  Overall Patient Progress: improving  Problem: Plan of Care - These are the overarching goals to be used throughout the patient stay.    Goal: Patient-Specific Goal (Individualized)  Outcome: Ongoing, Progressing  Flowsheets (Taken 6/9/2022 0528)  Individualized Care Needs: Keep informed, answer questions that patient and her mother may have  Anxieties, Fears or Concerns: Concerns about breastfeeding, doesn't feel too confident. Lactaction consult released.  Patient-Specific Goals (Include Timeframe): assist during feedings as needed   Goal Outcome Evaluation:    Plan of Care Reviewed With: patient     Overall Patient Progress: improving    VSS ex for elevated BP, none in severe range (see flowsheets). Postpartum assessments WDL. Fundus midline and firm at U/1 with scant/light bleeding. Pt complains of perineal discomfort, medicated with tylenol and ibuprofen and also using aubrey bottle and tucks. Pt is breastfeeding with assistance d/t sleepy baby. Has some concerns since she has little experience with breastfeeding; lactation consult released. Pt's mother is present and supportive in the room. Per pt request, FOB is NOT to to be allowed to visit. Great bonding and cue recognition observed with baby

## 2022-06-09 NOTE — PROGRESS NOTES
Labor progress note    S:  Pt is working hard with contractions  Requested pain med fentanyl pt relaxed noting some pressure     O:    General appearance: uncomfortable with contractions.  Contractions: Every 2-4 minutes. 60-80 seconds duration.  Palpate: strong.  FHT: Baseline 145 with mod  variability. Accelerations are present. occ variable  decelerations present.  ROM: clear fluid.  Pelvic exam: 8/ 80%/ Anterior/ average/ -1    Pitocin- 6 mu/min.,  Antibiotics- PCN    A:  IOL GHTN  Mild range blood pressures IUP @ 37w6d active labor   Fetal Heart rate tracing Category  Two intermittent  Mod variability   GBS- positive  Patient Active Problem List   Diagnosis     Vitamin D deficiency     Iron deficiency anemia secondary to inadequate dietary iron intake     HRP (high risk pregnancy), third trimester     Gestational hypertension     Vaginal bleeding     Fetal arrhythmia affecting pregnancy, antepartum     Elevated blood pressure affecting pregnancy in third trimester, antepartum     PUPPP (pruritic urticarial papules and plaques of pregnancy)     Labor and delivery, indication for care         P:  comfort measures prn   Pain medication fentanyl   Anticipate   MD consultant on call Dr Colón / available prn  reevaluate in 1-2 hours/PRN     JAQUI Wisdom CNM
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
## 2022-06-09 NOTE — PROGRESS NOTES
Postpartum Day #1 Note:     22 at 2233. Baby girl 3090g, apgars 9&9.  QBL 129mL, intact    SIGNIFICANT PROBLEMS:  Patient Active Problem List    Diagnosis Date Noted     Labor and delivery, indication for care 2022     Priority: Medium     Elevated blood pressure affecting pregnancy in third trimester, antepartum 2022     Priority: Medium     PUPPP (pruritic urticarial papules and plaques of pregnancy) 2022     Priority: Medium     Diagnosed in triage 22: Rx sent for benadryl prn, advised cetrizine daily, hydrocortisone 2.5% ointment        Fetal arrhythmia affecting pregnancy, antepartum 2022     Priority: Medium     premature atrial contractions are the most common fetal irregular cardiac  rhythm. PACs normally present in the late second or early third trimester and are most often benign. PACs are, however, associated with congenital heart diease in  approximately 1-2% of cases and can progress to sustained tachycardia in 2-3% of cases. We discussed the recommendation for weekly prolonged monitoring of fetal  heart rate to screen for progression to tachycardia. I recommended that she eliminate caffeine-containing foods and products as well as avoid smoking. If the fetal  arrhythmia is present until delivery, pediatrics should be notified so they can arrange for any necessary post-parul testing (e.g. EKG).,          Vaginal bleeding 2022     Priority: Medium     Gestational hypertension 2022     Priority: Medium       Due to diagnosis of gestational hypertension, we also recommend  surveillance twice weekly as well as serial growth ultrasounds q3 weeks.  22: HELLP labs WNL       Iron deficiency anemia secondary to inadequate dietary iron intake 2022     Priority: Medium     HRP (high risk pregnancy), third trimester 2022     Priority: Medium     Vitamin D deficiency 2021     Priority: Medium     21- Vit D 7- start 4000IU every  "day___         INTERVAL HISTORY:  BP (!) 143/105 (BP Location: Right arm, Patient Position: Semi-Perez's, Cuff Size: Adult Large)   Pulse 99   Temp 98.4  F (36.9  C) (Oral)   Resp 18   Ht 1.778 m (5' 10\")   Wt 108.9 kg (240 lb 1.6 oz)   LMP 09/16/2021   SpO2 98%   Breastfeeding Unknown   BMI 34.45 kg/m    Pt stable, baby is rooming in. Pt supported by her mom at bedside. Maternal-infant bonding observed.   Breast feeding status: initiated. Pt increasing in confidence, but still needing some assistance with waking baby and getting her latched. Lactation consult order placed.   Complications since 2 hours post delivery:   - Continued mild range elevated blood pressures. Pt denies headache, vision changes, abdominal pain, N&V. She is voiding well without issue. There was no hat in toilet upon transfer to PP room. Pt reports at least one void. No BM yet. Patient is tolerating activity well. Cramping is relieved by Ibuprofen, lochia is decreasing and patient denies clots.  Perineal pain is is minimal.      Physical Exam:  BP (!) 143/105 (BP Location: Right arm, Patient Position: Semi-Perez's, Cuff Size: Adult Large)   Pulse 99   Temp 98.4  F (36.9  C) (Oral)   Resp 18   Ht 1.778 m (5' 10\")   Wt 108.9 kg (240 lb 1.6 oz)   LMP 09/16/2021   SpO2 98%   Breastfeeding Unknown   BMI 34.45 kg/m      Breasts: soft, nipples intact  Abdomen: soft, nontender, fundus at U  Lungs: clear lung sounds bilaterally, non labored breath  Cardiac: normal R&R, pulse: 98, BP elevated 143/105  Lochia: light amount, rubra, no clots or odor  Perineum is intact   Extremities: trace pedal edema, no erythema or pain in LE, no clonus    Postpartum hemoglobin   Hemoglobin   Date Value Ref Range Status   06/09/2022 11.7 11.7 - 15.7 g/dL Final   06/25/2021 12.7 11.7 - 15.7 g/dL Final     Blood type   Lab Results   Component Value Date    ABO O 06/25/2021       Lab Results   Component Value Date    RH Neg 06/25/2021     Rubella status: " immune   History of depression: yes. No history of hospitalizations, no current meds. Postpartum depression warning signs reviewed.     ASSESSMENT/PLAN:  Post-partum day #1, stable  Complications:  - Gestational hypertension. Discussed blood pressures with Dr. Kumar who recommends low dose nifedipine or diuretic. Pt  Is diuresing (not all voids measured) and has little edema so will opt to start nifedipine 30mg daily. Plan to assess review effectiveness with MD team prior discharge.   - Breastfeeding. Latch assist needed. Offered encouragement and discuss there is a learning curve, and that her and Maria Victoria are doing well. Lactation consult ordered.     Plan d/c home tomorrow if Bps stable. RN home visit ordered to check BP and breastfeeding status within 72 hours.   RTC : 2 weeks and 6 weeks   Postpartum warning s/s reviewed, including bleeding/clots, fever, mastitis, or depression  Continue prenatal vitamins  Birthcontrol planned: reports she has done well on the Nuva Ring, would maybe consider the pill. Not interested in other methods.     Current Discharge Medication List      START taking these medications    Details   !! acetaminophen (TYLENOL) 325 MG tablet Take 2 tablets (650 mg) by mouth every 6 hours as needed for mild pain Start after Delivery.  Qty: 100 tablet, Refills: 0    Associated Diagnoses:  (normal spontaneous vaginal delivery)      ibuprofen (ADVIL/MOTRIN) 600 MG tablet Take 1 tablet (600 mg) by mouth every 6 hours as needed for moderate pain Start after delivery  Qty: 60 tablet, Refills: 0    Associated Diagnoses:  (normal spontaneous vaginal delivery)      senna-docusate (SENOKOT-S/PERICOLACE) 8.6-50 MG tablet Take 1 tablet by mouth daily Start after delivery.  Qty: 100 tablet, Refills: 0    Associated Diagnoses:  (normal spontaneous vaginal delivery)       !! - Potential duplicate medications found. Please discuss with provider.      CONTINUE these medications which have NOT  CHANGED    Details   !! acetaminophen (TYLENOL) 325 MG tablet Take 2 tablets (650 mg) by mouth every 6 hours as needed for mild pain or fever (greater than or equal to 38 C /100.4F (oral))  Qty: 100 tablet, Refills: 0    Associated Diagnoses: Vaginal delivery      albuterol (PROAIR HFA/PROVENTIL HFA/VENTOLIN HFA) 108 (90 Base) MCG/ACT inhaler Inhale 2 puffs into the lungs every 6 hours as needed for shortness of breath / dyspnea or wheezing  Qty: 18 g, Refills: 0      cetirizine (ZYRTEC) 10 MG tablet Take 1 tablet (10 mg) by mouth daily  Qty: 30 tablet, Refills: 0    Associated Diagnoses: PUPPP (pruritic urticarial papules and plaques of pregnancy)      hydrocortisone (CORTAID) 1 % external cream Apply topically 2 times daily  Qty: 30 g, Refills: 0      Prenatal Vit-Fe Fumarate-FA (PRENATAL MULTIVITAMIN W/IRON) 27-0.8 MG tablet Take 1 tablet by mouth daily      vitamin D3 (CHOLECALCIFEROL) 50 mcg (2000 units) tablet Take 2 tablets (100 mcg) by mouth daily Take two tablets daily.  Qty: 180 tablet, Refills: 3    Associated Diagnoses: Vitamin D deficiency       !! - Potential duplicate medications found. Please discuss with provider.      STOP taking these medications       diphenhydrAMINE (BENADRYL ALLERGY) 25 MG capsule Comments:   Reason for Stopping:         hydrocortisone 2.5 % cream Comments:   Reason for Stopping:             JAQUI Pino CNM

## 2022-06-09 NOTE — PLAN OF CARE
Data:  Severe range BP twice today, with the repeat WNLl Signs and symptoms of pre-eclampsia include a new headache starting this evening, will medicate with Tylenol when available. Fetal assessment  category one and two tracing .  Ledesma bulb for ripening along with IV pitocin  Interventions: Monitor blood pressures and indicators of pre-eclampsia. Continue uterine/fetal assessment continuous.   Plan: Continue expectant management. Observe for and notify care provider of indicators of worsening pre-eclampsia or signs of fetal/maternal compromise.

## 2022-06-09 NOTE — PROGRESS NOTES
Data: Vandana Lagos transferred to [facility] room 7145 via wheelchair at 0050 arrived to unit at 0100. Baby transferred via parent's arms.  Action: Receiving unit notified of transfer: Yes. Patient and family notified of room change. Report given to ROCKY Alanis at 0040 over the phone and updates given at the bedside. Belongings sent to receiving unit. Accompanied by Registered Nurse. Oriented patient to surroundings. Call light within reach. ID bands double-checked with receiving RN at 0101 ([id number]).  Response: Patient tolerated transfer and is stable.

## 2022-06-09 NOTE — PLAN OF CARE
Patient arrived to [facility] unit via wheelchair at 0100,with belongings, accompanied by spouse/ significant other, with infant in arms. Received report from Nathan HIDALGO and checked bands. Unit and room orientation completd. Call light given; no concerns present at this time. Continue with plan of care.

## 2022-06-09 NOTE — PROGRESS NOTES
CNM at bedside, review possible need for magnesium sulfate, Ledesma bulb out, SVE 4/60/-1, CNM offered AROM patient agrees. Reviewed pain medication options patient declines.

## 2022-06-09 NOTE — PROGRESS NOTES
"Labor progress note    S:  Pt reported headache to RN at 1850     Was given tylenol at 1920  Pt reports more bloody show  And stronger contractions   Reilly balloon in place per  Pt at last time she was up to void   Discussed  AROM pt is agreeable with reilly balloon removed with exam     O:  BP repeated at 1900 158/92   Blood pressure (!) 173/102, temperature 98.8  F (37.1  C), temperature source Oral, resp. rate 20, height 1.778 m (5' 10\"), weight 112.5 kg (248 lb), last menstrual period 2021, not currently breastfeeding.  Patient Vitals for the past 24 hrs:   BP Temp Temp src Resp Weight   22 1840 (!) 173/102 98.8  F (37.1  C) Oral 20 --   22 1627 129/75 99  F (37.2  C) Oral 22 --   22 1351 139/83 -- -- 20 --   22 1336 (!) 164/98 98.6  F (37  C) Oral 20 --   22 1140 133/70 98.6  F (37  C) Oral 20 --   22 0930 (!) 152/95 -- -- 20 --   22 0820 (!) 144/94 98.3  F (36.8  C) Oral 20 112.5 kg (248 lb)   22 0655 (!) 144/90 98.3  F (36.8  C) Oral 16 --   22 0350 120/76 97.8  F (36.6  C) Oral 16 --   22 2315 125/81 98.1  F (36.7  C) Oral 16 --   22 1946 (!) 142/87 98  F (36.7  C) Oral 18 --     General appearance: uncomfortable with contractions.  Contractions: Every 3-4 minutes. 60-80 seconds duration.  Palpate: moderate.  FHT: Baseline 140 with mod  variability. Accelerations are present. No  decelerations present.  Reilly balloon removed with slight traction, bloody show  Pelvic exam: 4/ 60%/ Mid/ soft/ -1  AROM:small amount of clear fluid noted   Pitocin- 12 mu/min.,  Antibiotics- PCN adequately treated     A:  25 yo  IUP @ 37w6d  IOL gestational hypertension   Fetal Heart rate tracing Category category one  GBS- positive  Patient Active Problem List   Diagnosis     Vitamin D deficiency     Iron deficiency anemia secondary to inadequate dietary iron intake     HRP (high risk pregnancy), third trimester     Gestational hypertension     Vaginal " bleeding     Fetal arrhythmia affecting pregnancy, antepartum     Elevated blood pressure affecting pregnancy in third trimester, antepartum     PUPPP (pruritic urticarial papules and plaques of pregnancy)     Labor and delivery, indication for care         P:  Reviewed pt status at safety rounds with Dr Colón, 1 severe range BP not sustained, pt reported headache   Plan to monitor closely if headache persistent or sustained severe range BP will consult MD to start IV mag sulfate   comfort measures prn   Pain medication tylenol po for h/a given   Reviewed labor pain relief options  Epidural nitrous, fentanyl  Declines all at this time    Prophylactic antibiotic for + GBS status  Anticipate   MD consultant on call Dr Colón / available prn  Labor augmentation with Pitocin  reevaluate in 2-4 hours/PRN   JAQUI Wisdom CNM

## 2022-06-09 NOTE — PROVIDER NOTIFICATION
06/09/22 0856   Provider Notification   Provider Name/Title ARCELIA Schultz   Method of Notification Electronic Page   Request Evaluate-Remote   Notification Reason Vital Signs Change   /105 at 0845

## 2022-06-09 NOTE — PROGRESS NOTES
"About 2 minutes post delivery FOB walked into the door. Pt was on the phone, taking pictures of baby, and did not acknowledge mother. FOB then went into the bathroom and started yelling on the phone about \"them not calling me or letting me be here for the birth of my own child.\" FOEMERITA was approached to attempt for him to quiet down his voice due to the infant just being born and mother just delivering. FOB started to escalate with attempts to calm him down but FOB continued to yell at grandmother and nurses. FOB then left the room shortly after.   "

## 2022-06-10 VITALS
OXYGEN SATURATION: 98 % | BODY MASS INDEX: 33.77 KG/M2 | RESPIRATION RATE: 16 BRPM | HEART RATE: 77 BPM | TEMPERATURE: 98.1 F | WEIGHT: 235.9 LBS | HEIGHT: 70 IN | DIASTOLIC BLOOD PRESSURE: 90 MMHG | SYSTOLIC BLOOD PRESSURE: 129 MMHG

## 2022-06-10 PROCEDURE — 250N000013 HC RX MED GY IP 250 OP 250 PS 637: Performed by: MIDWIFE

## 2022-06-10 PROCEDURE — 90716 VAR VACCINE LIVE SUBQ: CPT | Performed by: MIDWIFE

## 2022-06-10 PROCEDURE — 90471 IMMUNIZATION ADMIN: CPT | Performed by: MIDWIFE

## 2022-06-10 PROCEDURE — 250N000021 HC RX MED A9270 GY (STAT IND- M) 250: Performed by: MIDWIFE

## 2022-06-10 RX ORDER — NIFEDIPINE 30 MG/1
30 TABLET, EXTENDED RELEASE ORAL DAILY
Qty: 60 TABLET | Refills: 0 | Status: SHIPPED | OUTPATIENT
Start: 2022-06-10

## 2022-06-10 RX ADMIN — IBUPROFEN 800 MG: 800 TABLET ORAL at 09:41

## 2022-06-10 RX ADMIN — VARICELLA VIRUS VACCINE LIVE 0.5 ML: 1350 INJECTION, POWDER, LYOPHILIZED, FOR SUSPENSION SUBCUTANEOUS at 12:25

## 2022-06-10 RX ADMIN — IBUPROFEN 800 MG: 800 TABLET ORAL at 03:45

## 2022-06-10 RX ADMIN — DOCUSATE SODIUM 100 MG: 100 CAPSULE, LIQUID FILLED ORAL at 08:31

## 2022-06-10 RX ADMIN — ACETAMINOPHEN 325MG 650 MG: 325 TABLET ORAL at 03:46

## 2022-06-10 RX ADMIN — ACETAMINOPHEN 325MG 650 MG: 325 TABLET ORAL at 08:31

## 2022-06-10 RX ADMIN — NIFEDIPINE 30 MG: 30 TABLET, FILM COATED, EXTENDED RELEASE ORAL at 08:31

## 2022-06-10 ASSESSMENT — ACTIVITIES OF DAILY LIVING (ADL)
ADLS_ACUITY_SCORE: 18

## 2022-06-10 NOTE — PLAN OF CARE
Goal Outcome Evaluation:    Plan of Care Reviewed With: patient     Overall Patient Progress: improving    VSS and postpartum assessments WNL. BP 120s/90s, to follow up with home care for BP check. Denies s/sx of pre-eclampsia. Pain adequately managed with tylenol and ibuprofen. Breastfeeding independently, some assist given earlier in the day for positioning and latching, improving. Bonding well with  and responsive to cues. Expresses readiness for transition to home. Follow-up instructions reviewed, discharge education completed and questions answered, patient verbalizes understanding. Home medications sent to preferred pharmacy, instructions reviewed. Discharged to home with baby in car seat.

## 2022-06-10 NOTE — DISCHARGE INSTRUCTIONS
Postpartum Vaginal Delivery Instructions    Activity     Ask family and friends for help when you need it.  Do not place anything in your vagina for 6 weeks.  You are not restricted on other activities, but take it easy for a few weeks to allow your body to recover from delivery.  You are able to do any activities you feel up to that point.  No driving until you have stopped taking your pain medications (usually two weeks after delivery).     Call your health care provider if you have any of these symptoms:     Increased pain, swelling, redness, or fluid around your stiches from an episiotomy or perineal tear.  A fever above 100.4 F (38 C) with or without chills when placing a thermometer under your tongue.  You soak a sanitary pad with blood within 1 hour, or you see blood clots larger than a golf ball.  Bleeding that lasts more than 6 weeks.  Vaginal discharge that smells bad.  Severe pain, cramping or tenderness in your lower belly area.  A need to urinate more frequently (use the toilet more often), more urgently (use the toilet very quickly), or it burns when you urinate.  Nausea and vomiting.  Redness, swelling or pain around a vein in your leg.  Problems breastfeeding or a red or painful area on your breast.  Chest pain and cough or are gasping for air.  Problems coping with sadness, anxiety, or depression.  If you have any concerns about hurting yourself or the baby, call your provider immediately.   You have questions or concerns after you return home.     Keep your hands clean:  Always wash your hands before touching your perineal area and stitches.  This helps reduce your risk of infection.  If your hands aren't dirty, you may use an alcohol hand-rub to clean your hands. Keep your nails clean and short.      Preeclampsia   Call your doctor right away if you have any of the following:  - Edema (swelling) in your face or hands  - Rapid weight gain-about 1 pound or more in a day  - Headache  - Abdominal pain  on your right side  - Vision problems (flashes or spots)  - You have questions or concerns once you return home.

## 2022-06-10 NOTE — PLAN OF CARE
Goal Outcome Evaluation:    Plan of Care Reviewed With: patient      Data: VSS with BP elevated and patient asymptomatic; postpartum assessments WNL. She is voiding without difficulty, up ad emilia, bowel sounds present, eating and drinking normally. Perineum appears to be healing well, lochia WNL, no s/s infection. Breastfeeding infant and started pumping this evening with minimal assistance. Has not complained about pain throughout shift but has medication available, and is using Tucks pads for aubrey care. Reports good pain management.   Action: Education provided on plan of care and pumping techniques.  Response: Pt is agreeable with her plan of care. Positive attachment behaviors observed with infant. Anticipate discharge per care plan.

## 2022-06-10 NOTE — PLAN OF CARE
Goal Outcome Evaluation:    Plan of Care Reviewed With: patient     Overall Patient Progress: improving     Data: Vital signs within normal limits, bp elevated but within parameters. Postpartum checks within normal limits - see flow record. Patient eating and drinking normally. Patient able to empty bladder independently and is up ambulating. No apparent signs of infection.  Laceration  healing well. Patient performing self cares and is able to care for infant.  Action: Patient medicated during the shift for pain and cramping. See MAR. Patient reassessed within 1 hour after each medication and pain was improved - patient stated she was comfortable. Patient education done about pp goals and expectations. See flow record.  Response: Positive attachment behaviors observed with infant. Pt frustrated with breastfeeding, has seen lactation today. Pt making attempts to bring baby to breast with enc, infant fatiguing. Enc pt to pump to stimulate production and feed ebm to infant. Pt agreeable.   Plan: Anticipate discharge on 6/11.

## 2022-06-10 NOTE — LACTATION NOTE
"This note was copied from a baby's chart.  Follow up visit, RN shares mom's report of infant sleepy overnight though frequently see her sucking on pacifier. Infant reluctant to latch, mom ended up pumping and giving 9 mL of breastmilk by bottle.     Infant alert and sucking on pacifier in crib upon arrival, mom sleeping, >3 hours since last bottle. Offer and mom accept support with latching baby. Reviewed techniques to help her get on deeper and reinforced differences with 37 week baby compared to her other two born after 40 weeks. Encouraged to feed her at least 8 times/day and watch for early cues. Discouraged pacifier use pointing out how different it is from the breast and could be contributing to her reluctance to latch (infant fussy and pulling away for first several attempts but finally settled into latch after mom expressing colostrum to her) as well as spacing out feeds for her. Reviewed benefits of hands on pumping at least four times daily due to early term baby (make a \"40 week supply\") and encouraged more often if able in order to stimulate production greater than she had last time and minimize further infant weight loss.     Baby did feed well after several attempts to latch, and Vandana was able to get her with good latch on her own. Offer support and encouragement, recommend LC follow up outpatient due to early term delivery and history of low supply. Reviewed resources and breastfeeding log, how to tell if getting enough and when to call provider if concerns.   "

## 2022-06-10 NOTE — DISCHARGE SUMMARY
[facility] Discharge Summary    Vandana Lagos MRN# [id number]   Age: 26 year old YOB: 1995     Date of Admission:  6/7/2022  Date of Discharge::  6/10/2022  Admitting Physician:  JAQUI Rouse CNM  Discharge Physician:  JAQUI Rubio CNM      Home clinic: [facility]          Admission Diagnoses:   Labor and delivery, indication for care [O75.9]  Gestational hypertension  Induction of labor          Discharge Diagnosis:     Normal spontaneous vaginal delivery  Intrauterine pregnancy at 37+6 weeks gestation          Procedures:     Procedure(s): No additional procedures performed       No procedures performed during this admission           Medications Prior to Admission:     Medications Prior to Admission   Medication Sig Dispense Refill Last Dose     acetaminophen (TYLENOL) 325 MG tablet Take 2 tablets (650 mg) by mouth every 6 hours as needed for mild pain or fever (greater than or equal to 38 C /100.4F (oral)) 100 tablet 0 6/6/2022 at Unknown time     albuterol (PROAIR HFA/PROVENTIL HFA/VENTOLIN HFA) 108 (90 Base) MCG/ACT inhaler Inhale 2 puffs into the lungs every 6 hours as needed for shortness of breath / dyspnea or wheezing 18 g 0 Past Week at Unknown time     cetirizine (ZYRTEC) 10 MG tablet Take 1 tablet (10 mg) by mouth daily 30 tablet 0 Past Week at Unknown time     hydrocortisone (CORTAID) 1 % external cream Apply topically 2 times daily 30 g 0 Past Week at Unknown time     Prenatal Vit-Fe Fumarate-FA (PRENATAL MULTIVITAMIN W/IRON) 27-0.8 MG tablet Take 1 tablet by mouth daily   Past Week at Unknown time     vitamin D3 (CHOLECALCIFEROL) 50 mcg (2000 units) tablet Take 2 tablets (100 mcg) by mouth daily Take two tablets daily. 180 tablet 3 Past Week at Unknown time     [DISCONTINUED] diphenhydrAMINE (BENADRYL ALLERGY) 25 MG capsule Take 1-2 capsules (25-50 mg) by mouth nightly as needed for itching or allergies 25 capsule 0 Past Week at Unknown time      [DISCONTINUED] hydrocortisone 2.5 % cream Apply topically 2 times daily 30 g 0 Past Week at Unknown time             Discharge Medications:     Current Discharge Medication List      START taking these medications    Details   !! acetaminophen (TYLENOL) 325 MG tablet Take 2 tablets (650 mg) by mouth every 6 hours as needed for mild pain Start after Delivery.  Qty: 100 tablet, Refills: 0    Associated Diagnoses:  (normal spontaneous vaginal delivery)      ibuprofen (ADVIL/MOTRIN) 600 MG tablet Take 1 tablet (600 mg) by mouth every 6 hours as needed for moderate pain Start after delivery  Qty: 60 tablet, Refills: 0    Associated Diagnoses:  (normal spontaneous vaginal delivery)      NIFEdipine ER OSMOTIC (PROCARDIA XL) 30 MG 24 hr tablet Take 1 tablet (30 mg) by mouth daily  Qty: 60 tablet, Refills: 0    Associated Diagnoses: Gestational hypertension, third trimester      senna-docusate (SENOKOT-S/PERICOLACE) 8.6-50 MG tablet Take 1 tablet by mouth daily Start after delivery.  Qty: 100 tablet, Refills: 0    Associated Diagnoses:  (normal spontaneous vaginal delivery)       !! - Potential duplicate medications found. Please discuss with provider.      CONTINUE these medications which have NOT CHANGED    Details   !! acetaminophen (TYLENOL) 325 MG tablet Take 2 tablets (650 mg) by mouth every 6 hours as needed for mild pain or fever (greater than or equal to 38 C /100.4F (oral))  Qty: 100 tablet, Refills: 0    Associated Diagnoses: Vaginal delivery      albuterol (PROAIR HFA/PROVENTIL HFA/VENTOLIN HFA) 108 (90 Base) MCG/ACT inhaler Inhale 2 puffs into the lungs every 6 hours as needed for shortness of breath / dyspnea or wheezing  Qty: 18 g, Refills: 0      cetirizine (ZYRTEC) 10 MG tablet Take 1 tablet (10 mg) by mouth daily  Qty: 30 tablet, Refills: 0    Associated Diagnoses: PUPPP (pruritic urticarial papules and plaques of pregnancy)      hydrocortisone (CORTAID) 1 % external cream Apply topically 2  times daily  Qty: 30 g, Refills: 0      Prenatal Vit-Fe Fumarate-FA (PRENATAL MULTIVITAMIN W/IRON) 27-0.8 MG tablet Take 1 tablet by mouth daily      vitamin D3 (CHOLECALCIFEROL) 50 mcg (2000 units) tablet Take 2 tablets (100 mcg) by mouth daily Take two tablets daily.  Qty: 180 tablet, Refills: 3    Associated Diagnoses: Vitamin D deficiency       !! - Potential duplicate medications found. Please discuss with provider.      STOP taking these medications       diphenhydrAMINE (BENADRYL ALLERGY) 25 MG capsule Comments:   Reason for Stopping:         hydrocortisone 2.5 % cream Comments:   Reason for Stopping:                     Consultations:   Consultation during this admission received from lactation.          Brief History of Labor:   Delivery Summary     Vandana Lagos MRN# [id number]   Age: 26 year old YOB: 1995   Delivery Note     Brief course of labor: pt admitted 22 mendez of 2, 4 vag miso, reilly balloon and IV pit AROM    Pt received 1 dose IV fentanyl 100 mcg      Pt became complete at 2230 and started pushing 2230. Delivered a vigorous baby girl  who was immediately placed on mom's abdomen. Umbilical cord was double clamped and cut  after the cord stopped pulsing.  Placenta spontaneously delivered intact without difficulty via Shultze mechanism. Intact perineum .  Fundus is firm and midline.  Mom and baby are stable. Nicu present for birth due to variable decels      IUP at 37 weeks gestation delivered on 2022.     delivery of a viable Female infant.  Weight : pending  Apgars of 9 at 1 minute and 9 at 5 minutes.  Labor was induced.  Medications administered  in labor:  Pain Rx sedative  and narcotics ; Antibiotics Yes:   Perineum: Intact  Placenta-mechanism: spontaneous, intact,  with a 3 vessel cord. IV oxytocin was given.  QBL was 100.  Anticipated Discharge Date: 6/10/22  Complications of pregnancy, labor and delivery: GHTN, cat 2 tracing IOL   Birth attendants:Tracy  "JAQUI Aguilera CNM, ARCELIA NAILS    ASSESSMENT & PLAN: routine PP care        Assessment Day of Discharge    Vital signs:  Temp: 98.1  F (36.7  C) Temp src: Oral BP: (!) 129/90 Pulse: 77   Resp: 16   O2 Device: None (Room air)   Height: 177.8 cm (5' 10\") Weight: 107 kg (235 lb 14.4 oz)  Estimated body mass index is 33.85 kg/m  as calculated from the following:    Height as of this encounter: 1.778 m (5' 10\").    Weight as of this encounter: 107 kg (235 lb 14.4 oz).      Breasts: Soft, filling  Nipples: Intact, Non-tender  Abdomen: Soft, Non-tender    Uterus: Fundus Firm, Non-tender, located 1cm below the umbilicus   Lochia: Rubra, appropriate amount    Perineum:  Well-approximated, healing well  Lower Extremities: trace Edema Bilateral, Negative Gretchen's Sign           Hospital Course:   The patient's hospital course was unremarkable except mild elevated blood pressures after delivery. Upon review with MD team she was started on 30mg XL procardia daily. Blood pressure this morning was still mild range. Patient is asymptomatic. Reviewed with Dr. Martin who agrees she should continue this with a BP follow up in 2-3 days.  On discharge, pain was well controlled with prn tylenol and ibuprofen. Vaginal bleeding is similar to peak menstrual flow and decreasing.  Voiding without difficulty.  Ambulating well and tolerating a normal diet.  No fever.  Breastfeeding well. Feeling better after consultation with lactation. Infant feeding better. Infant is stable.  Has not had bowel movement, but taking stool softener and passing gas. Mood stable, has family supports identified.   Vandana Lagos was discharged on post-partum day #2.    Post-partum hemoglobin:   Hemoglobin   Date Value Ref Range Status   06/09/2022 11.7 11.7 - 15.7 g/dL Final   06/25/2021 12.7 11.7 - 15.7 g/dL Final        Rh: negative, Rhogam given 6/9/2022 @ 1138  Rubella status: immune  Plan for contraception: nuva ring at 6 weeks.  Reviewed Chapter " One of  [facility]  Family Book including warning signs of postpartum, activity level, avoiding IC for 6 weeks, Tub soaks BID, Kegels, abdominal exercises, breast care,  postpartum depression/anxiety.  Reviewed how to establish/maintain milk supply, nipple care, pumping for storage, fore/hind milk, wet/dirty diapers to expect each day, resources prn if questions or concerns.  Pt verbalized understanding with teach back.          Discharge Instructions and Follow-Up:     Discharge diet: Regular, Iron Rich, High Fiber, Minimum 80oz water daily   Discharge activity: Pelvic rest: abstain from intercourse and do not use tampons for 6 week(s)   Discharge follow-up: Follow up BP check Monday, pp home care referral placed  Follow up with [facility] in 2 weeks  Follow up with [facility] in 6 weeks   Wound care: Drink plenty of fluids  Ice to area for comfort   Avoid constipation            Discharge Disposition:     Discharged to home          JAQUI Rubio CNM

## 2022-06-11 DIAGNOSIS — Z71.89 OTHER SPECIFIED COUNSELING: ICD-10-CM

## 2022-06-12 ENCOUNTER — PATIENT OUTREACH (OUTPATIENT)
Dept: CARE COORDINATION | Facility: CLINIC | Age: 27
End: 2022-06-12
Payer: COMMERCIAL

## 2022-06-12 NOTE — TELEPHONE ENCOUNTER
"Clinic Care Coordination Contact  [facility]: Post-Discharge Note  SITUATION                                                      Admission:    Admission Date: 06/07/22   Reason for Admission: Labor and delivery, indication for care (O75.9)  Gestational hypertension  Induction of labor  Discharge:   Discharge Date: 06/10/22  Discharge Diagnosis: Normal spontaneous vaginal delivery  Intrauterine pregnancy at 37+6 weeks gestation    BACKGROUND                                                      Per hospital discharge summary and inpatient provider notes:  The patient's hospital course was unremarkable except mild elevated blood pressures after delivery. Upon review with MD team she was started on 30mg XL procardia daily. Blood pressure this morning was still mild range. Patient is asymptomatic. Reviewed with Dr. Martin who agrees she should continue this with a BP follow up in 2-3 days.  On discharge, pain was well controlled with prn tylenol and ibuprofen. Vaginal bleeding is similar to peak menstrual flow and decreasing.  Voiding without difficulty.  Ambulating well and tolerating a normal diet.  No fever.  Breastfeeding well. Feeling better after consultation with lactation. Infant feeding better. Infant is stable.  Has not had bowel movement, but taking stool softener and passing gas. Mood stable, has family supports identified.   Vandana Lagos was discharged on post-partum day #2.    ASSESSMENT      Enrollment  Primary Care Care Coordination Status: Not a Candidate    Discharge Assessment  How are you doing now that you are home?: \"great\"  How are your symptoms? (Red Flag symptoms escalate to triage hotline per guidelines): Improved  Do you feel your condition is stable enough to be safe at home until your provider visit?: Yes  Does the patient have their discharge instructions? : Yes (has not yet reviewed but has no questions)  Were you started on any new medications or were there changes " to any of your previous medications? : Yes  Does the patient have all of their medications?: No (see comment) (tried to  blood pressure medication yesterday but pharmacy did not have this. Will try another pharmacy today or tomorrow per patient)  Do you have questions regarding any of your medications? :  (NA)  Discharge follow-up appointment scheduled within 14 calendar days? : No  Is patient agreeable to assistance with scheduling? : No (Patient will call [facility] clinic to arrange follow up appointment)         Post-op (Clinicians Only)  Did the patient have surgery or a procedure: No        PLAN                                                      Outpatient Plan: patient to have home care nurse check blood pressure per recommendations of discharging provider. Patient has not yet heard of an appointment time but expects to hear soon.     No future appointments.      For any urgent concerns, please contact our 24 hour nurse triage line: [phone] ([phone])         Keeley Almazan RN  [facility]

## 2022-08-08 ENCOUNTER — LAB REQUISITION (OUTPATIENT)
Dept: LAB | Facility: CLINIC | Age: 27
End: 2022-08-08
Payer: COMMERCIAL

## 2022-08-08 DIAGNOSIS — Z11.3 ENCOUNTER FOR SCREENING FOR INFECTIONS WITH A PREDOMINANTLY SEXUAL MODE OF TRANSMISSION: ICD-10-CM

## 2022-08-08 DIAGNOSIS — Z12.4 ENCOUNTER FOR SCREENING FOR MALIGNANT NEOPLASM OF CERVIX: ICD-10-CM

## 2022-08-08 PROCEDURE — G0145 SCR C/V CYTO,THINLAYER,RESCR: HCPCS | Performed by: ADVANCED PRACTICE MIDWIFE

## 2022-08-08 PROCEDURE — 87491 CHLMYD TRACH DNA AMP PROBE: CPT | Mod: ORL | Performed by: ADVANCED PRACTICE MIDWIFE

## 2022-08-08 PROCEDURE — G0124 SCREEN C/V THIN LAYER BY MD: HCPCS | Performed by: PATHOLOGY

## 2022-08-08 PROCEDURE — G0145 SCR C/V CYTO,THINLAYER,RESCR: HCPCS | Mod: ORL | Performed by: ADVANCED PRACTICE MIDWIFE

## 2022-08-08 PROCEDURE — 88141 CYTOPATH C/V INTERPRET: CPT | Performed by: PATHOLOGY

## 2022-08-08 PROCEDURE — 87591 N.GONORRHOEAE DNA AMP PROB: CPT | Mod: ORL | Performed by: ADVANCED PRACTICE MIDWIFE

## 2022-08-08 PROCEDURE — 87624 HPV HI-RISK TYP POOLED RSLT: CPT | Performed by: ADVANCED PRACTICE MIDWIFE

## 2022-08-09 LAB
C TRACH DNA SPEC QL NAA+PROBE: NEGATIVE
N GONORRHOEA DNA SPEC QL NAA+PROBE: NEGATIVE

## 2022-08-09 PROCEDURE — 87624 HPV HI-RISK TYP POOLED RSLT: CPT | Mod: ORL | Performed by: ADVANCED PRACTICE MIDWIFE

## 2022-08-11 LAB
BKR LAB AP GYN ADEQUACY: ABNORMAL
BKR LAB AP GYN INTERPRETATION: ABNORMAL
BKR LAB AP HPV REFLEX: ABNORMAL
BKR LAB AP PREVIOUS ABNL DX: ABNORMAL
BKR LAB AP PREVIOUS ABNORMAL: ABNORMAL
PATH REPORT.COMMENTS IMP SPEC: ABNORMAL
PATH REPORT.COMMENTS IMP SPEC: ABNORMAL
PATH REPORT.RELEVANT HX SPEC: ABNORMAL

## 2022-08-16 LAB
HUMAN PAPILLOMA VIRUS 16 DNA: NEGATIVE
HUMAN PAPILLOMA VIRUS 18 DNA: NEGATIVE
HUMAN PAPILLOMA VIRUS FINAL DIAGNOSIS: ABNORMAL
HUMAN PAPILLOMA VIRUS OTHER HR: POSITIVE

## 2022-09-15 ENCOUNTER — OFFICE VISIT (OUTPATIENT)
Dept: URGENT CARE | Facility: URGENT CARE | Age: 27
End: 2022-09-15
Payer: COMMERCIAL

## 2022-09-15 VITALS
HEART RATE: 86 BPM | SYSTOLIC BLOOD PRESSURE: 122 MMHG | RESPIRATION RATE: 15 BRPM | WEIGHT: 235 LBS | TEMPERATURE: 98.6 F | DIASTOLIC BLOOD PRESSURE: 72 MMHG | HEIGHT: 70 IN | BODY MASS INDEX: 33.64 KG/M2 | OXYGEN SATURATION: 96 %

## 2022-09-15 DIAGNOSIS — B96.89 BACTERIAL VAGINOSIS: Primary | ICD-10-CM

## 2022-09-15 DIAGNOSIS — R10.2 PELVIC CRAMPING: ICD-10-CM

## 2022-09-15 DIAGNOSIS — N76.0 BACTERIAL VAGINOSIS: Primary | ICD-10-CM

## 2022-09-15 LAB
ALBUMIN UR-MCNC: NEGATIVE MG/DL
APPEARANCE UR: CLEAR
BILIRUB UR QL STRIP: NEGATIVE
CLUE CELLS: PRESENT
COLOR UR AUTO: YELLOW
GLUCOSE UR STRIP-MCNC: NEGATIVE MG/DL
HCG UR QL: NEGATIVE
HGB UR QL STRIP: NEGATIVE
KETONES UR STRIP-MCNC: NEGATIVE MG/DL
LEUKOCYTE ESTERASE UR QL STRIP: NEGATIVE
NITRATE UR QL: NEGATIVE
PH UR STRIP: 6 [PH] (ref 5–7)
SP GR UR STRIP: >=1.03 (ref 1–1.03)
TRICHOMONAS, WET PREP: ABNORMAL
UROBILINOGEN UR STRIP-ACNC: 0.2 E.U./DL
WBC'S/HIGH POWER FIELD, WET PREP: ABNORMAL
YEAST, WET PREP: ABNORMAL

## 2022-09-15 PROCEDURE — 81003 URINALYSIS AUTO W/O SCOPE: CPT

## 2022-09-15 PROCEDURE — 87491 CHLMYD TRACH DNA AMP PROBE: CPT | Performed by: FAMILY MEDICINE

## 2022-09-15 PROCEDURE — 81025 URINE PREGNANCY TEST: CPT | Performed by: FAMILY MEDICINE

## 2022-09-15 PROCEDURE — 99203 OFFICE O/P NEW LOW 30 MIN: CPT | Performed by: FAMILY MEDICINE

## 2022-09-15 PROCEDURE — 87210 SMEAR WET MOUNT SALINE/INK: CPT

## 2022-09-15 PROCEDURE — 87591 N.GONORRHOEAE DNA AMP PROB: CPT | Performed by: FAMILY MEDICINE

## 2022-09-15 RX ORDER — METRONIDAZOLE 7.5 MG/G
1 GEL VAGINAL DAILY
Qty: 25 G | Refills: 0 | Status: SHIPPED | OUTPATIENT
Start: 2022-09-15 | End: 2022-09-20

## 2022-09-15 NOTE — PROGRESS NOTES
Assessment & Plan     Pelvic cramping  - Neisseria gonorrhoeae PCR - Clinic Collect  - Chlamydia trachomatis PCR - Clinic Collect  - UA macro with reflex to Microscopic and Culture - Clinc Collect  - Wet prep - Clinic Collect  - HCG Qual, Urine ([id number])  - HCG Qual, Urine ([id number])    Bacterial vaginosis  - metroNIDAZOLE (METROGEL) 0.75 % vaginal gel  Dispense: 25 g; Refill: 0     Low suspicion for PID at this time. Patient only requesting GC/CT testing. Wet prep consistent with her symptoms of itching/foul smell for BV -- she elects for topical treatment as she has done this in the past.       Amos De La Rosa MD   [facility]    Velia Ross is a 27 year old female who presents to clinic today for the following health issues:  Chief Complaint   Patient presents with     Urgent Care     Vaginal itching and odor x 1 week. Having some lower abdominal cramping.      HPI      Denies fevers. Lower pelvic discomfort no abdominal pain. No reported vomiting/diarrhea  Periods have been regular due for one in a few days. On oral contraceptive  Hx of chlamydia many years ago does routine screening. In monogamous relationship no other partners. No known exposures to STDs      Patient Active Problem List    Diagnosis Date Noted     Labor and delivery, indication for care 06/07/2022     Priority: Medium     Elevated blood pressure affecting pregnancy in third trimester, antepartum 05/27/2022     Priority: Medium     PUPPP (pruritic urticarial papules and plaques of pregnancy) 05/27/2022     Priority: Medium     Diagnosed in triage 5/27/22: Rx sent for benadryl prn, advised cetrizine daily, hydrocortisone 2.5% ointment        Fetal arrhythmia affecting pregnancy, antepartum 05/03/2022     Priority: Medium     premature atrial contractions are the most common fetal irregular cardiac  rhythm. PACs normally present in the late second or early third trimester and are most often benign. PACs are, however,  "associated with congenital heart diease in  approximately 1-2% of cases and can progress to sustained tachycardia in 2-3% of cases. We discussed the recommendation for weekly prolonged monitoring of fetal  heart rate to screen for progression to tachycardia. I recommended that she eliminate caffeine-containing foods and products as well as avoid smoking. If the fetal  arrhythmia is present until delivery, pediatrics should be notified so they can arrange for any necessary post-parul testing (e.g. EKG).,          Vaginal bleeding 2022     Priority: Medium     Gestational hypertension 2022     Priority: Medium       Due to diagnosis of gestational hypertension, we also recommend  surveillance twice weekly as well as serial growth ultrasounds q3 weeks.  22: HELLP labs WNL       Iron deficiency anemia secondary to inadequate dietary iron intake 2022     Priority: Medium     HRP (high risk pregnancy), third trimester 2022     Priority: Medium     Vitamin D deficiency 2021     Priority: Medium     21- Vit D 7- start 4000IU every day___         Current Outpatient Medications   Medication     metroNIDAZOLE (METROGEL) 0.75 % vaginal gel     acetaminophen (TYLENOL) 325 MG tablet     acetaminophen (TYLENOL) 325 MG tablet     albuterol (PROAIR HFA/PROVENTIL HFA/VENTOLIN HFA) 108 (90 Base) MCG/ACT inhaler     cetirizine (ZYRTEC) 10 MG tablet     hydrocortisone (CORTAID) 1 % external cream     ibuprofen (ADVIL/MOTRIN) 600 MG tablet     NIFEdipine ER OSMOTIC (PROCARDIA XL) 30 MG 24 hr tablet     Prenatal Vit-Fe Fumarate-FA (PRENATAL MULTIVITAMIN W/IRON) 27-0.8 MG tablet     senna-docusate (SENOKOT-S/PERICOLACE) 8.6-50 MG tablet     vitamin D3 (CHOLECALCIFEROL) 50 mcg (2000 units) tablet     No current facility-administered medications for this visit.           Objective    /72   Pulse 86   Temp 98.6  F (37  C) (Temporal)   Resp 15   Ht 1.778 m (5' 10\")   Wt 106.6 kg (235 " lb)   LMP 08/16/2022   SpO2 96%   Breastfeeding No   BMI 33.72 kg/m    Physical Exam     GEN: good historian  Abd: no guarding    Results for orders placed or performed in visit on 09/15/22   UA macro with reflex to Microscopic and Culture - Clinc Collect     Status: Normal    Specimen: Urine, Midstream   Result Value Ref Range    Color Urine Yellow Colorless, Straw, Light Yellow, Yellow    Appearance Urine Clear Clear    Glucose Urine Negative Negative mg/dL    Bilirubin Urine Negative Negative    Ketones Urine Negative Negative mg/dL    Specific Gravity Urine >=1.030 1.003 - 1.035    Blood Urine Negative Negative    pH Urine 6.0 5.0 - 7.0    Protein Albumin Urine Negative Negative mg/dL    Urobilinogen Urine 0.2 0.2, 1.0 E.U./dL    Nitrite Urine Negative Negative    Leukocyte Esterase Urine Negative Negative    Narrative    Microscopic not indicated   HCG Qual, Urine ([id number])     Status: Normal   Result Value Ref Range    hCG Urine Qualitative Negative Negative   Wet prep - Clinic Collect     Status: Abnormal    Specimen: Vagina; Swab   Result Value Ref Range    Trichomonas Absent Absent    Yeast Absent Absent    Clue Cells Present (A) Absent    WBCs/high power field 1+ (A) None                     The use of Dragon/Rapidlea dictation services may have been used to construct the content in this note; any grammatical or spelling errors are non-intentional. Please contact the author of this note directly if you are in need of any clarification.

## 2022-09-15 NOTE — PATIENT INSTRUCTIONS
Use the metrogel inserts for 5 days to treat bacterial vaginosis      If your symptoms worsen ( abdominal pain, fever, cramps) -- please seek medical attention right away      If your other tests return positive our team will be calling you

## 2022-09-16 LAB
C TRACH DNA SPEC QL NAA+PROBE: NEGATIVE
N GONORRHOEA DNA SPEC QL NAA+PROBE: NEGATIVE

## 2022-11-03 ENCOUNTER — APPOINTMENT (OUTPATIENT)
Dept: ULTRASOUND IMAGING | Facility: CLINIC | Age: 27
End: 2022-11-03
Attending: EMERGENCY MEDICINE
Payer: COMMERCIAL

## 2022-11-03 ENCOUNTER — HOSPITAL ENCOUNTER (EMERGENCY)
Facility: CLINIC | Age: 27
Discharge: HOME | End: 2022-11-03
Attending: EMERGENCY MEDICINE | Admitting: EMERGENCY MEDICINE
Payer: COMMERCIAL

## 2022-11-03 VITALS
OXYGEN SATURATION: 96 % | WEIGHT: 251 LBS | HEART RATE: 88 BPM | TEMPERATURE: 98.6 F | DIASTOLIC BLOOD PRESSURE: 85 MMHG | SYSTOLIC BLOOD PRESSURE: 123 MMHG | BODY MASS INDEX: 35.93 KG/M2 | RESPIRATION RATE: 18 BRPM | HEIGHT: 70 IN

## 2022-11-03 DIAGNOSIS — R10.13 ABDOMINAL PAIN, EPIGASTRIC: ICD-10-CM

## 2022-11-03 LAB
ALBUMIN SERPL-MCNC: 3.5 G/DL (ref 3.4–5)
ALBUMIN UR-MCNC: NEGATIVE MG/DL
ALP SERPL-CCNC: 80 U/L (ref 40–150)
ALT SERPL W P-5'-P-CCNC: 88 U/L (ref 0–50)
ANION GAP SERPL CALCULATED.3IONS-SCNC: 5 MMOL/L (ref 3–14)
APPEARANCE UR: CLEAR
AST SERPL W P-5'-P-CCNC: 46 U/L (ref 0–45)
BASOPHILS # BLD AUTO: 0 10E3/UL (ref 0–0.2)
BASOPHILS NFR BLD AUTO: 1 %
BILIRUB SERPL-MCNC: 0.2 MG/DL (ref 0.2–1.3)
BILIRUB UR QL STRIP: NEGATIVE
BUN SERPL-MCNC: 16 MG/DL (ref 7–30)
CALCIUM SERPL-MCNC: 9.5 MG/DL (ref 8.5–10.1)
CHLORIDE BLD-SCNC: 108 MMOL/L (ref 94–109)
CO2 SERPL-SCNC: 28 MMOL/L (ref 20–32)
COLOR UR AUTO: ABNORMAL
CREAT SERPL-MCNC: 0.66 MG/DL (ref 0.52–1.04)
EOSINOPHIL # BLD AUTO: 0 10E3/UL (ref 0–0.7)
EOSINOPHIL NFR BLD AUTO: 1 %
ERYTHROCYTE [DISTWIDTH] IN BLOOD BY AUTOMATED COUNT: 13 % (ref 10–15)
GFR SERPL CREATININE-BSD FRML MDRD: >90 ML/MIN/1.73M2
GLUCOSE BLD-MCNC: 84 MG/DL (ref 70–99)
GLUCOSE UR STRIP-MCNC: NEGATIVE MG/DL
HCG UR QL: NEGATIVE
HCT VFR BLD AUTO: 40.3 % (ref 35–47)
HGB BLD-MCNC: 13.1 G/DL (ref 11.7–15.7)
HGB UR QL STRIP: NEGATIVE
IMM GRANULOCYTES # BLD: 0 10E3/UL
IMM GRANULOCYTES NFR BLD: 0 %
KETONES UR STRIP-MCNC: NEGATIVE MG/DL
LEUKOCYTE ESTERASE UR QL STRIP: ABNORMAL
LIPASE SERPL-CCNC: 374 U/L (ref 73–393)
LYMPHOCYTES # BLD AUTO: 2.4 10E3/UL (ref 0.8–5.3)
LYMPHOCYTES NFR BLD AUTO: 42 %
MCH RBC QN AUTO: 29.5 PG (ref 26.5–33)
MCHC RBC AUTO-ENTMCNC: 32.5 G/DL (ref 31.5–36.5)
MCV RBC AUTO: 91 FL (ref 78–100)
MONOCYTES # BLD AUTO: 0.4 10E3/UL (ref 0–1.3)
MONOCYTES NFR BLD AUTO: 7 %
MUCOUS THREADS #/AREA URNS LPF: PRESENT /LPF
NEUTROPHILS # BLD AUTO: 2.8 10E3/UL (ref 1.6–8.3)
NEUTROPHILS NFR BLD AUTO: 49 %
NITRATE UR QL: NEGATIVE
NRBC # BLD AUTO: 0 10E3/UL
NRBC BLD AUTO-RTO: 0 /100
PH UR STRIP: 6 [PH] (ref 5–7)
PLATELET # BLD AUTO: 219 10E3/UL (ref 150–450)
POTASSIUM BLD-SCNC: 4.1 MMOL/L (ref 3.4–5.3)
PROT SERPL-MCNC: 7 G/DL (ref 6.8–8.8)
RBC # BLD AUTO: 4.44 10E6/UL (ref 3.8–5.2)
RBC URINE: 0 /HPF
SODIUM SERPL-SCNC: 141 MMOL/L (ref 133–144)
SP GR UR STRIP: 1.03 (ref 1–1.03)
SQUAMOUS EPITHELIAL: 7 /HPF
UROBILINOGEN UR STRIP-MCNC: NORMAL MG/DL
WBC # BLD AUTO: 5.7 10E3/UL (ref 4–11)
WBC URINE: 2 /HPF

## 2022-11-03 PROCEDURE — 99284 EMERGENCY DEPT VISIT MOD MDM: CPT | Performed by: EMERGENCY MEDICINE

## 2022-11-03 PROCEDURE — 87086 URINE CULTURE/COLONY COUNT: CPT | Performed by: EMERGENCY MEDICINE

## 2022-11-03 PROCEDURE — 99284 EMERGENCY DEPT VISIT MOD MDM: CPT | Mod: 25 | Performed by: EMERGENCY MEDICINE

## 2022-11-03 PROCEDURE — 83690 ASSAY OF LIPASE: CPT | Performed by: EMERGENCY MEDICINE

## 2022-11-03 PROCEDURE — 250N000013 HC RX MED GY IP 250 OP 250 PS 637: Performed by: EMERGENCY MEDICINE

## 2022-11-03 PROCEDURE — 250N000009 HC RX 250: Performed by: EMERGENCY MEDICINE

## 2022-11-03 PROCEDURE — 36415 COLL VENOUS BLD VENIPUNCTURE: CPT | Performed by: EMERGENCY MEDICINE

## 2022-11-03 PROCEDURE — 80053 COMPREHEN METABOLIC PANEL: CPT | Performed by: EMERGENCY MEDICINE

## 2022-11-03 PROCEDURE — 81001 URINALYSIS AUTO W/SCOPE: CPT | Performed by: EMERGENCY MEDICINE

## 2022-11-03 PROCEDURE — 82040 ASSAY OF SERUM ALBUMIN: CPT | Performed by: EMERGENCY MEDICINE

## 2022-11-03 PROCEDURE — 81025 URINE PREGNANCY TEST: CPT | Performed by: EMERGENCY MEDICINE

## 2022-11-03 PROCEDURE — 76705 ECHO EXAM OF ABDOMEN: CPT

## 2022-11-03 PROCEDURE — 85025 COMPLETE CBC W/AUTO DIFF WBC: CPT | Performed by: EMERGENCY MEDICINE

## 2022-11-03 RX ORDER — LIDOCAINE HYDROCHLORIDE 20 MG/ML
10 SOLUTION OROPHARYNGEAL ONCE
Status: DISCONTINUED | OUTPATIENT
Start: 2022-11-03 | End: 2022-11-04 | Stop reason: HOSPADM

## 2022-11-03 RX ORDER — LIDOCAINE HYDROCHLORIDE 20 MG/ML
SOLUTION OROPHARYNGEAL
Status: DISCONTINUED
Start: 2022-11-03 | End: 2022-11-04 | Stop reason: HOSPADM

## 2022-11-03 RX ADMIN — LIDOCAINE HYDROCHLORIDE 30 ML: 20 SOLUTION ORAL; TOPICAL at 23:27

## 2022-11-03 ASSESSMENT — ENCOUNTER SYMPTOMS
BLOOD IN STOOL: 0
ABDOMINAL PAIN: 1
NAUSEA: 1
DYSURIA: 0
FREQUENCY: 1
VOMITING: 0
CONSTIPATION: 0

## 2022-11-03 ASSESSMENT — ACTIVITIES OF DAILY LIVING (ADL)
ADLS_ACUITY_SCORE: 33
ADLS_ACUITY_SCORE: 35

## 2022-11-03 NOTE — ED TRIAGE NOTES
Patient presents due to generalized intermittent 6/10 burning/cramping abdominal pain. Patient reports pain starting during period; however pain has continued. Patient reports having this pain in the past with gastritis.  Patient denies other symptoms.      Triage Assessment     Row Name 11/03/22 [id number]       Triage Assessment (Adult)    Airway WDL WDL       Respiratory WDL    Respiratory WDL WDL       Skin Circulation/Temperature WDL    Skin Circulation/Temperature WDL WDL       Cardiac WDL    Cardiac WDL WDL       Peripheral/Neurovascular WDL    Peripheral Neurovascular WDL WDL       Cognitive/Neuro/Behavioral WDL    Cognitive/Neuro/Behavioral WDL WDL

## 2022-11-03 NOTE — LETTER
November 3, 2022      To Whom It May Concern:      Vandana Lagos was seen in our Emergency Department today, 11/03/22.  I expect her condition to improve over the next 1-2 days.  She may return to work/school when improved.    Sincerely,        Per Jin MD

## 2022-11-04 NOTE — DISCHARGE INSTRUCTIONS
Your evaluation in the emergency department was normal.  Your LFTs were mildly elevated, you can follow-up with your primary care doctor regarding this.  But your ultrasound showed no problems with your gallbladder.  Your labs are otherwise unremarkable.    I would recommend you try taking a daily Protonix to see if this improves your pain and following up with primary care.  If your pain significantly worsens, you develop fever or new symptoms please return to the emergency department.

## 2022-11-04 NOTE — ED PROVIDER NOTES
[facility] [place] EMERGENCY DEPARTMENT ([facility])    11/03/22     ED 2  History     Chief Complaint   Patient presents with     Abdominal Pain     Patient presents due to generalized intermittent 6/10 burning/cramping abdominal pain. Patient reports pain starting during period; however pain has continued. Patient reports having this pain in the past with gastritis.  Patient denies other symptoms.      The history is provided by the patient and medical records.     Vandana Lagos is a 27 year old female with history of gastritis who presents with intermittent abdominal pain since 10/16/2022.  She has had recurrent episodes of diffuse waxing waning mid-abdominal pain that lasts 30 minutes and then subsides, has about 5 episodes a day. Thought she was constipated but hasn't had issues stooling.  She also thought that perhaps this was due to her period, but she had her period and this abdominal pain has not gone away.  The pain is worst first thing in morning, at night and middle of the day.   She does feel hungry and at times eating helps her stomach settle and other times it makes it worse. Symptoms do feel similar to gastritis. She has tried Midol and acetaminophen for this, this helped for a bit but pain came back. Has noticed urine odor, urinary frequency but otherwise no dysuria or other urinary symptoms. She has nausea, no vomiting. No black stools.  No right upper quadrant abdominal pain. No alcohol use. No history of abdominal surgery.  She may have a chance of pregnancy, states that her pregnancy test at home was negative.     Epic records reviewed, she had delivered on 6/7/2022 in setting of high risk pregnancy complicated by PUPPP.    I have reviewed the Medications, Allergies, Past Medical and Surgical History, and Social History in the [employer] system.  PAST MEDICAL HISTORY:   Past Medical History:   Diagnosis Date     Iron deficiency anemia secondary to inadequate dietary iron intake 4/11/2022      Preeclampsia 2017     Uncomplicated asthma        PAST SURGICAL HISTORY:   Past Surgical History:   Procedure Laterality Date     ENT SURGERY         Past medical history, past surgical history, medications, and allergies were reviewed with the patient. Additional pertinent items: None    FAMILY HISTORY: History reviewed. No pertinent family history.    SOCIAL HISTORY:   Social History     Tobacco Use     Smoking status: Former     Types: Cigarettes     Quit date: 2017     Years since quittin.7     Smokeless tobacco: Never   Substance Use Topics     Alcohol use: No     Social history was reviewed with the patient. Additional pertinent items: None      Patient's Medications   New Prescriptions    No medications on file   Previous Medications    ACETAMINOPHEN (TYLENOL) 325 MG TABLET    Take 2 tablets (650 mg) by mouth every 6 hours as needed for mild pain or fever (greater than or equal to 38 C /100.4F (oral))    ACETAMINOPHEN (TYLENOL) 325 MG TABLET    Take 2 tablets (650 mg) by mouth every 6 hours as needed for mild pain Start after Delivery.    ALBUTEROL (PROAIR HFA/PROVENTIL HFA/VENTOLIN HFA) 108 (90 BASE) MCG/ACT INHALER    Inhale 2 puffs into the lungs every 6 hours as needed for shortness of breath / dyspnea or wheezing    CETIRIZINE (ZYRTEC) 10 MG TABLET    Take 1 tablet (10 mg) by mouth daily    HYDROCORTISONE (CORTAID) 1 % EXTERNAL CREAM    Apply topically 2 times daily    IBUPROFEN (ADVIL/MOTRIN) 600 MG TABLET    Take 1 tablet (600 mg) by mouth every 6 hours as needed for moderate pain Start after delivery    NIFEDIPINE ER OSMOTIC (PROCARDIA XL) 30 MG 24 HR TABLET    Take 1 tablet (30 mg) by mouth daily    PRENATAL VIT-FE FUMARATE-FA (PRENATAL MULTIVITAMIN W/IRON) 27-0.8 MG TABLET    Take 1 tablet by mouth daily    SENNA-DOCUSATE (SENOKOT-S/PERICOLACE) 8.6-50 MG TABLET    Take 1 tablet by mouth daily Start after delivery.    VITAMIN D3 (CHOLECALCIFEROL) 50 MCG (2000 UNITS) TABLET    Take 2  "tablets (100 mcg) by mouth daily Take two tablets daily.   Modified Medications    No medications on file   Discontinued Medications    No medications on file        No Known Allergies     Review of Systems   Gastrointestinal: Positive for abdominal pain and nausea. Negative for blood in stool, constipation and vomiting.   Genitourinary: Positive for frequency. Negative for dysuria.        Malodorous urine   All other systems reviewed and are negative.    A complete review of systems was performed with pertinent positives and negatives noted in the HPI, and all other systems negative.    Physical Exam   BP: 123/85  Pulse: 88  Temp: 98.6  F (37  C)  Resp: 18  Height: 177.8 cm (5' 10\")  Weight: 113.9 kg (251 lb)  SpO2: 96 %      General: awake, alert, NAD  Head: normal cephalic  HEENT: pupils equal, conjugate gaze intact  Neck: Supple  CV: regular rate and rhythm without murmur  Lungs: clear to auscultation  Abd: soft, non-tender, no guarding, no peritoneal signs  EXT: lower extremities without swelling or edema  Neuro: awake, answers questions appropriately. No focal deficits noted       ED Course     Medications   lidocaine (viscous) (XYLOCAINE) 2 % 15 mL, alum & mag hydroxide-simethicone (MAALOX) 15 mL GI Cocktail (has no administration in time range)     Results for orders placed or performed during the hospital encounter of 11/03/22   Abdomen US, limited (RUQ only)     Status: None    Narrative    EXAM: US ABDOMEN LIMITED  LOCATION: [facility]  DATE/TIME: 11/3/2022 9:52 PM    INDICATION: adominal pain  COMPARISON: None.  TECHNIQUE: Limited abdominal ultrasound.    FINDINGS:    GALLBLADDER: Normal. No gallstones, wall thickening, or pericholecystic fluid. Negative sonographic Valenzuela's sign.    BILE DUCTS: No biliary dilatation. The common duct measures 4.9 mm.    LIVER: Mild increased parenchymal echogenicity most for mild fatty infiltration. No focal mass.    RIGHT " KIDNEY: No hydronephrosis.    PANCREAS: Not well seen given overlying bowel gas.    No ascites.      Impression    IMPRESSION:  1.  Mild fatty infiltration liver.    2.  Pancreas not well seen given overlying bowel gas.    3.  Right abdomen is otherwise normal to include the gallbladder with no bile duct dilatation.       Comprehensive metabolic panel     Status: Abnormal   Result Value Ref Range    Sodium 141 133 - 144 mmol/L    Potassium 4.1 3.4 - 5.3 mmol/L    Chloride 108 94 - 109 mmol/L    Carbon Dioxide (CO2) 28 20 - 32 mmol/L    Anion Gap 5 3 - 14 mmol/L    Urea Nitrogen 16 7 - 30 mg/dL    Creatinine 0.66 0.52 - 1.04 mg/dL    Calcium 9.5 8.5 - 10.1 mg/dL    Glucose 84 70 - 99 mg/dL    Alkaline Phosphatase 80 40 - 150 U/L    AST 46 (H) 0 - 45 U/L    ALT 88 (H) 0 - 50 U/L    Protein Total 7.0 6.8 - 8.8 g/dL    Albumin 3.5 3.4 - 5.0 g/dL    Bilirubin Total 0.2 0.2 - 1.3 mg/dL    GFR Estimate >90 >60 mL/min/1.73m2   Lipase     Status: Normal   Result Value Ref Range    Lipase 374 73 - 393 U/L   HCG qualitative urine     Status: Normal   Result Value Ref Range    hCG Urine Qualitative Negative Negative   UA with Microscopic reflex to Culture     Status: Abnormal    Specimen: Urine, Clean Catch   Result Value Ref Range    Color Urine Light Yellow Colorless, Straw, Light Yellow, Yellow    Appearance Urine Clear Clear    Glucose Urine Negative Negative mg/dL    Bilirubin Urine Negative Negative    Ketones Urine Negative Negative mg/dL    Specific Gravity Urine 1.031 1.003 - 1.035    Blood Urine Negative Negative    pH Urine 6.0 5.0 - 7.0    Protein Albumin Urine Negative Negative mg/dL    Urobilinogen Urine Normal Normal, 2.0 mg/dL    Nitrite Urine Negative Negative    Leukocyte Esterase Urine Small (A) Negative    Mucus Urine Present (A) None Seen /LPF    RBC Urine 0 <=2 /HPF    WBC Urine 2 <=5 /HPF    Squamous Epithelials Urine 7 (H) <=1 /HPF    Narrative    Urine Culture not indicated   CBC with platelets and  differential     Status: None   Result Value Ref Range    WBC Count 5.7 4.0 - 11.0 10e3/uL    RBC Count 4.44 3.80 - 5.20 10e6/uL    Hemoglobin 13.1 11.7 - 15.7 g/dL    Hematocrit 40.3 35.0 - 47.0 %    MCV 91 78 - 100 fL    MCH 29.5 26.5 - 33.0 pg    MCHC 32.5 31.5 - 36.5 g/dL    RDW 13.0 10.0 - 15.0 %    Platelet Count 219 150 - 450 10e3/uL    % Neutrophils 49 %    % Lymphocytes 42 %    % Monocytes 7 %    % Eosinophils 1 %    % Basophils 1 %    % Immature Granulocytes 0 %    NRBCs per 100 WBC 0 <1 /100    Absolute Neutrophils 2.8 1.6 - 8.3 10e3/uL    Absolute Lymphocytes 2.4 0.8 - 5.3 10e3/uL    Absolute Monocytes 0.4 0.0 - 1.3 10e3/uL    Absolute Eosinophils 0.0 0.0 - 0.7 10e3/uL    Absolute Basophils 0.0 0.0 - 0.2 10e3/uL    Absolute Immature Granulocytes 0.0 <=0.4 10e3/uL    Absolute NRBCs 0.0 10e3/uL   CBC with platelets differential     Status: None    Narrative    The following orders were created for panel order CBC with platelets differential.  Procedure                               Abnormality         Status                     ---------                               -----------         ------                     CBC with platelets and d...[[id number]]                      Final result                 Please view results for these tests on the individual orders.          Procedures      The medical record was reviewed and interpreted.  Current labs reviewed and interpreted.  Previous labs reviewed and interpreted.            Assessments & Plan (with Medical Decision Making)   Vandana is a 27-year-old female who presents to the emergency department with diffuse abdominal pain for several weeks.    On exam she is well-appearing, nontoxic, has no pain currently.  She has normal vital signs and a benign abdominal exam.  She points to just above her umbilicus as the area typically affected by pain.    Differential include things like pregnancy, peptic ulcer disease, gastritis, IBS, given chronicity less likely  to be something like pancreatitis would also have lower suspicion for things like appendicitis or cholecystitis given the chronicity.    Initial evaluation include basic labs including LFTs, CBC, pregnancy.    She did have mildly elevated LFTs.  Patient denies any heavy alcohol or Tylenol use on repeat history.  I did obtain a right upper quadrant ultrasound that showed mild fatty infiltration but no gallstones.  Remainder of her laboratory studies are normal including normal white count, no left shift.  Normal lipase.  Negative pregnancy test.    Given the normal laboratory studies, normal vital signs, and benign abdominal exam I do not feel that further imaging or work-up is necessary here in the emergency department.  I discussed her LFT results and need for follow-up with primary care.  I recommended trying to take Protonix to see if this helps her symptoms in the interim.  She was given ER return precautions.    I have reviewed the nursing notes.    I have reviewed the findings, diagnosis, plan and need for follow up with the patient.    New Prescriptions    No medications on file       Final diagnoses:   Abdominal pain, epigastric       I, Letty Kim, am serving as a trained medical scribe to document services personally performed by Per Jin MD based on the provider's statements to me on November 3, 2022.  This document has been checked and approved by the attending provider.    I, Per Jin MD, was physically present and have reviewed and verified the accuracy of this note documented by Letty Kim, medical scribe.      Per Jin MD     11/3/2022   [facility] EMERGENCY DEPARTMENT         Per Jin MD  11/03/22 [id number]

## 2022-11-04 NOTE — ED NOTES
Patient Verbalized understanding of discharge instructions including medication administration and recommended follow up care as noted on discharge instructions. Written discharge instructions given, denies any further questions.

## 2022-11-05 LAB — BACTERIA UR CULT: NO GROWTH

## 2023-03-09 ENCOUNTER — LAB REQUISITION (OUTPATIENT)
Dept: LAB | Facility: CLINIC | Age: 28
End: 2023-03-09
Payer: COMMERCIAL

## 2023-03-09 DIAGNOSIS — Z11.3 ENCOUNTER FOR SCREENING FOR INFECTIONS WITH A PREDOMINANTLY SEXUAL MODE OF TRANSMISSION: ICD-10-CM

## 2023-03-09 DIAGNOSIS — R74.8 ABNORMAL LEVELS OF OTHER SERUM ENZYMES: ICD-10-CM

## 2023-03-09 LAB
ALBUMIN SERPL BCG-MCNC: 4.2 G/DL (ref 3.5–5.2)
ALP SERPL-CCNC: 89 U/L (ref 35–104)
ALT SERPL W P-5'-P-CCNC: 28 U/L (ref 10–35)
ANION GAP SERPL CALCULATED.3IONS-SCNC: 13 MMOL/L (ref 7–15)
AST SERPL W P-5'-P-CCNC: 22 U/L (ref 10–35)
BILIRUB SERPL-MCNC: 0.3 MG/DL
BUN SERPL-MCNC: 11.6 MG/DL (ref 6–20)
CALCIUM SERPL-MCNC: 9.4 MG/DL (ref 8.6–10)
CHLORIDE SERPL-SCNC: 107 MMOL/L (ref 98–107)
CREAT SERPL-MCNC: 0.64 MG/DL (ref 0.51–0.95)
DEPRECATED HCO3 PLAS-SCNC: 20 MMOL/L (ref 22–29)
GFR SERPL CREATININE-BSD FRML MDRD: >90 ML/MIN/1.73M2
GLUCOSE SERPL-MCNC: 92 MG/DL (ref 70–99)
POTASSIUM SERPL-SCNC: 3.9 MMOL/L (ref 3.4–5.3)
PROT SERPL-MCNC: 7 G/DL (ref 6.4–8.3)
SODIUM SERPL-SCNC: 140 MMOL/L (ref 136–145)

## 2023-03-09 PROCEDURE — 87491 CHLMYD TRACH DNA AMP PROBE: CPT | Mod: ORL | Performed by: INTERNAL MEDICINE

## 2023-03-09 PROCEDURE — 87591 N.GONORRHOEAE DNA AMP PROB: CPT | Mod: ORL | Performed by: INTERNAL MEDICINE

## 2023-03-09 PROCEDURE — 80053 COMPREHEN METABOLIC PANEL: CPT | Mod: ORL | Performed by: INTERNAL MEDICINE

## 2023-03-10 LAB
C TRACH DNA SPEC QL NAA+PROBE: NEGATIVE
N GONORRHOEA DNA SPEC QL NAA+PROBE: NEGATIVE

## 2023-03-16 ENCOUNTER — LAB REQUISITION (OUTPATIENT)
Dept: LAB | Facility: CLINIC | Age: 28
End: 2023-03-16
Payer: COMMERCIAL

## 2023-03-16 DIAGNOSIS — R87.629 UNSPECIFIED ABNORMAL CYTOLOGICAL FINDINGS IN SPECIMENS FROM VAGINA: ICD-10-CM

## 2023-03-16 PROCEDURE — 88305 TISSUE EXAM BY PATHOLOGIST: CPT | Mod: 26 | Performed by: STUDENT IN AN ORGANIZED HEALTH CARE EDUCATION/TRAINING PROGRAM

## 2023-03-16 PROCEDURE — 88305 TISSUE EXAM BY PATHOLOGIST: CPT | Performed by: STUDENT IN AN ORGANIZED HEALTH CARE EDUCATION/TRAINING PROGRAM

## 2023-03-16 PROCEDURE — 88305 TISSUE EXAM BY PATHOLOGIST: CPT | Mod: TC,ORL | Performed by: FAMILY MEDICINE

## 2023-03-21 LAB
PATH REPORT.COMMENTS IMP SPEC: NORMAL
PATH REPORT.COMMENTS IMP SPEC: NORMAL
PATH REPORT.FINAL DX SPEC: NORMAL
PATH REPORT.GROSS SPEC: NORMAL
PATH REPORT.MICROSCOPIC SPEC OTHER STN: NORMAL
PATH REPORT.RELEVANT HX SPEC: NORMAL
PHOTO IMAGE: NORMAL

## 2023-05-18 ENCOUNTER — LAB REQUISITION (OUTPATIENT)
Dept: LAB | Facility: CLINIC | Age: 28
End: 2023-05-18
Payer: COMMERCIAL

## 2023-05-18 DIAGNOSIS — Z11.3 ENCOUNTER FOR SCREENING FOR INFECTIONS WITH A PREDOMINANTLY SEXUAL MODE OF TRANSMISSION: ICD-10-CM

## 2023-05-18 DIAGNOSIS — Z11.1 ENCOUNTER FOR SCREENING FOR RESPIRATORY TUBERCULOSIS: ICD-10-CM

## 2023-05-18 PROCEDURE — 87340 HEPATITIS B SURFACE AG IA: CPT | Performed by: FAMILY MEDICINE

## 2023-05-18 PROCEDURE — 86803 HEPATITIS C AB TEST: CPT | Mod: ORL | Performed by: FAMILY MEDICINE

## 2023-05-18 PROCEDURE — 86780 TREPONEMA PALLIDUM: CPT | Mod: ORL | Performed by: FAMILY MEDICINE

## 2023-05-18 PROCEDURE — 87491 CHLMYD TRACH DNA AMP PROBE: CPT | Mod: ORL | Performed by: FAMILY MEDICINE

## 2023-05-18 PROCEDURE — 87389 HIV-1 AG W/HIV-1&-2 AB AG IA: CPT | Performed by: FAMILY MEDICINE

## 2023-05-18 PROCEDURE — 86481 TB AG RESPONSE T-CELL SUSP: CPT | Mod: ORL | Performed by: FAMILY MEDICINE

## 2023-05-18 PROCEDURE — 87591 N.GONORRHOEAE DNA AMP PROB: CPT | Mod: ORL | Performed by: FAMILY MEDICINE

## 2023-05-19 LAB
C TRACH DNA SPEC QL NAA+PROBE: NEGATIVE
HBV SURFACE AG SERPL QL IA: NONREACTIVE
HCV AB SERPL QL IA: NONREACTIVE
HIV 1+2 AB+HIV1 P24 AG SERPL QL IA: NONREACTIVE
N GONORRHOEA DNA SPEC QL NAA+PROBE: NEGATIVE
T PALLIDUM AB SER QL: NONREACTIVE

## 2023-05-20 LAB
GAMMA INTERFERON BACKGROUND BLD IA-ACNC: 0.2 IU/ML
M TB IFN-G BLD-IMP: NEGATIVE
M TB IFN-G CD4+ BCKGRND COR BLD-ACNC: 9.8 IU/ML
MITOGEN IGNF BCKGRD COR BLD-ACNC: 0.04 IU/ML
MITOGEN IGNF BCKGRD COR BLD-ACNC: 0.11 IU/ML
QUANTIFERON MITOGEN: 10 IU/ML
QUANTIFERON NIL TUBE: 0.2 IU/ML
QUANTIFERON TB1 TUBE: 0.24 IU/ML
QUANTIFERON TB2 TUBE: 0.31

## 2023-09-29 ENCOUNTER — LAB REQUISITION (OUTPATIENT)
Dept: LAB | Facility: CLINIC | Age: 28
End: 2023-09-29
Payer: COMMERCIAL

## 2023-09-29 DIAGNOSIS — Z11.3 ENCOUNTER FOR SCREENING FOR INFECTIONS WITH A PREDOMINANTLY SEXUAL MODE OF TRANSMISSION: ICD-10-CM

## 2023-09-29 PROCEDURE — 87491 CHLMYD TRACH DNA AMP PROBE: CPT | Mod: ORL | Performed by: FAMILY MEDICINE

## 2023-09-29 PROCEDURE — 87591 N.GONORRHOEAE DNA AMP PROB: CPT | Mod: ORL | Performed by: FAMILY MEDICINE

## 2023-09-30 LAB
C TRACH DNA SPEC QL NAA+PROBE: NEGATIVE
N GONORRHOEA DNA SPEC QL NAA+PROBE: NEGATIVE

## 2024-02-08 ENCOUNTER — LAB REQUISITION (OUTPATIENT)
Dept: LAB | Facility: CLINIC | Age: 29
End: 2024-02-08
Payer: COMMERCIAL

## 2024-02-08 DIAGNOSIS — J02.9 ACUTE PHARYNGITIS, UNSPECIFIED: ICD-10-CM

## 2024-02-08 DIAGNOSIS — Z11.3 ENCOUNTER FOR SCREENING FOR INFECTIONS WITH A PREDOMINANTLY SEXUAL MODE OF TRANSMISSION: ICD-10-CM

## 2024-02-08 LAB
HIV 1+2 AB+HIV1 P24 AG SERPL QL IA: NONREACTIVE
T PALLIDUM AB SER QL: NONREACTIVE

## 2024-02-08 PROCEDURE — 86780 TREPONEMA PALLIDUM: CPT | Mod: ORL | Performed by: FAMILY MEDICINE

## 2024-02-08 PROCEDURE — 87591 N.GONORRHOEAE DNA AMP PROB: CPT | Mod: ORL | Performed by: FAMILY MEDICINE

## 2024-02-08 PROCEDURE — 87491 CHLMYD TRACH DNA AMP PROBE: CPT | Mod: ORL | Performed by: FAMILY MEDICINE

## 2024-02-08 PROCEDURE — 87389 HIV-1 AG W/HIV-1&-2 AB AG IA: CPT | Mod: ORL | Performed by: FAMILY MEDICINE

## 2024-02-08 PROCEDURE — 87081 CULTURE SCREEN ONLY: CPT | Mod: ORL | Performed by: FAMILY MEDICINE

## 2024-02-09 LAB
C TRACH DNA SPEC QL NAA+PROBE: NEGATIVE
N GONORRHOEA DNA SPEC QL NAA+PROBE: NEGATIVE

## 2024-02-10 LAB — BACTERIA SPEC CULT: NORMAL

## 2024-11-04 ENCOUNTER — LAB REQUISITION (OUTPATIENT)
Dept: LAB | Facility: CLINIC | Age: 29
End: 2024-11-04
Payer: COMMERCIAL

## 2024-11-04 DIAGNOSIS — Z11.3 ENCOUNTER FOR SCREENING FOR INFECTIONS WITH A PREDOMINANTLY SEXUAL MODE OF TRANSMISSION: ICD-10-CM

## 2024-11-04 DIAGNOSIS — Z12.4 ENCOUNTER FOR SCREENING FOR MALIGNANT NEOPLASM OF CERVIX: ICD-10-CM

## 2024-11-04 PROCEDURE — 88141 CYTOPATH C/V INTERPRET: CPT | Performed by: PATHOLOGY

## 2024-11-04 PROCEDURE — 86780 TREPONEMA PALLIDUM: CPT | Mod: ORL | Performed by: NURSE PRACTITIONER

## 2024-11-04 PROCEDURE — G0145 SCR C/V CYTO,THINLAYER,RESCR: HCPCS | Mod: ORL | Performed by: NURSE PRACTITIONER

## 2024-11-04 PROCEDURE — 87491 CHLMYD TRACH DNA AMP PROBE: CPT | Mod: ORL | Performed by: NURSE PRACTITIONER

## 2024-11-04 PROCEDURE — 87389 HIV-1 AG W/HIV-1&-2 AB AG IA: CPT | Mod: ORL | Performed by: NURSE PRACTITIONER

## 2024-11-04 PROCEDURE — 87591 N.GONORRHOEAE DNA AMP PROB: CPT | Mod: ORL | Performed by: NURSE PRACTITIONER

## 2024-11-04 PROCEDURE — 87624 HPV HI-RISK TYP POOLED RSLT: CPT | Mod: ORL | Performed by: NURSE PRACTITIONER

## 2024-11-06 LAB
HPV HR 12 DNA CVX QL NAA+PROBE: POSITIVE
HPV16 DNA CVX QL NAA+PROBE: NEGATIVE
HPV18 DNA CVX QL NAA+PROBE: NEGATIVE
HUMAN PAPILLOMA VIRUS FINAL DIAGNOSIS: ABNORMAL

## 2024-11-11 LAB
BKR AP ASSOCIATED HPV REPORT: ABNORMAL
BKR LAB AP GYN ADEQUACY: ABNORMAL
BKR LAB AP GYN INTERPRETATION: ABNORMAL
BKR LAB AP LMP: ABNORMAL
BKR LAB AP PREVIOUS ABNL DX: ABNORMAL
BKR LAB AP PREVIOUS ABNORMAL: ABNORMAL
PATH REPORT.COMMENTS IMP SPEC: ABNORMAL
PATH REPORT.COMMENTS IMP SPEC: ABNORMAL
PATH REPORT.RELEVANT HX SPEC: ABNORMAL